# Patient Record
Sex: FEMALE | Race: WHITE | Employment: OTHER | URBAN - NONMETROPOLITAN AREA
[De-identification: names, ages, dates, MRNs, and addresses within clinical notes are randomized per-mention and may not be internally consistent; named-entity substitution may affect disease eponyms.]

---

## 2024-05-08 VITALS — BODY MASS INDEX: 27.44 KG/M2 | WEIGHT: 160.72 LBS | HEIGHT: 64 IN

## 2024-05-08 PROBLEM — J18.9 PNEUMONIA OF RIGHT LOWER LOBE DUE TO INFECTIOUS ORGANISM: Status: ACTIVE | Noted: 2023-08-26

## 2024-05-08 PROBLEM — I25.10 CORONARY ATHEROSCLEROSIS: Status: ACTIVE | Noted: 2023-04-03

## 2024-05-08 PROBLEM — R55 SYNCOPE AND COLLAPSE: Status: ACTIVE | Noted: 2021-06-08

## 2024-05-08 PROBLEM — E83.42 HYPOMAGNESEMIA: Status: ACTIVE | Noted: 2020-03-16

## 2024-05-08 PROBLEM — I48.91 ATRIAL FIBRILLATION WITH RVR (HCC): Status: ACTIVE | Noted: 2018-11-05

## 2024-05-08 PROBLEM — R53.81 PHYSICAL DECONDITIONING: Status: ACTIVE | Noted: 2023-03-29

## 2024-05-08 PROBLEM — R06.02 SHORTNESS OF BREATH: Status: ACTIVE | Noted: 2023-03-13

## 2024-05-08 PROBLEM — J44.1 COPD EXACERBATION (HCC): Status: ACTIVE | Noted: 2022-11-16

## 2024-05-08 PROBLEM — R29.6 FREQUENT FALLS: Status: ACTIVE | Noted: 2023-12-08

## 2024-05-08 PROBLEM — R41.82 ALTERED MENTAL STATUS: Status: ACTIVE | Noted: 2023-03-22

## 2024-05-08 PROBLEM — F33.9 DEPRESSION, RECURRENT (HCC): Status: ACTIVE | Noted: 2021-12-08

## 2024-05-08 PROBLEM — R53.1 WEAKNESS: Status: ACTIVE | Noted: 2023-03-27

## 2024-05-08 RX ORDER — FUROSEMIDE 40 MG/1
40 TABLET ORAL DAILY
COMMUNITY
Start: 2023-12-08

## 2024-05-08 RX ORDER — FERROUS SULFATE 325(65) MG
325 TABLET ORAL
COMMUNITY
Start: 2023-12-08 | End: 2024-06-05

## 2024-05-08 RX ORDER — ALBUTEROL SULFATE 90 UG/1
2 AEROSOL, METERED RESPIRATORY (INHALATION) EVERY 4 HOURS PRN
COMMUNITY
Start: 2023-05-30

## 2024-05-08 RX ORDER — POTASSIUM CHLORIDE 20 MEQ/1
20 TABLET, EXTENDED RELEASE ORAL 2 TIMES DAILY
COMMUNITY
Start: 2023-12-08

## 2024-05-08 RX ORDER — PANTOPRAZOLE SODIUM 40 MG/1
40 TABLET, DELAYED RELEASE ORAL DAILY
COMMUNITY
Start: 2023-04-03

## 2024-05-08 RX ORDER — SUCRALFATE 1 G/1
1 TABLET ORAL
COMMUNITY
Start: 2024-02-09

## 2024-05-08 RX ORDER — ACETAMINOPHEN 500 MG
1000 TABLET ORAL EVERY 6 HOURS PRN
COMMUNITY
Start: 2023-12-08 | End: 2024-12-07

## 2024-05-08 RX ORDER — ALBUTEROL SULFATE 2.5 MG/3ML
2.5 SOLUTION RESPIRATORY (INHALATION) EVERY 4 HOURS PRN
COMMUNITY
Start: 2023-03-16

## 2024-05-08 RX ORDER — AMIODARONE HYDROCHLORIDE 200 MG/1
200 TABLET ORAL DAILY
COMMUNITY
Start: 2022-11-23

## 2024-05-08 RX ORDER — PSEUDOEPHEDRINE HCL 30 MG
100 TABLET ORAL DAILY
COMMUNITY

## 2024-05-15 ENCOUNTER — INITIAL CONSULT (OUTPATIENT)
Dept: SURGERY | Age: 89
End: 2024-05-15
Payer: MEDICARE

## 2024-05-15 VITALS
TEMPERATURE: 97.5 F | DIASTOLIC BLOOD PRESSURE: 82 MMHG | BODY MASS INDEX: 27.59 KG/M2 | HEIGHT: 64 IN | SYSTOLIC BLOOD PRESSURE: 122 MMHG | HEART RATE: 93 BPM | RESPIRATION RATE: 16 BRPM | OXYGEN SATURATION: 96 %

## 2024-05-15 DIAGNOSIS — K62.5 BRBPR (BRIGHT RED BLOOD PER RECTUM): Primary | ICD-10-CM

## 2024-05-15 PROCEDURE — 1123F ACP DISCUSS/DSCN MKR DOCD: CPT | Performed by: PHYSICIAN ASSISTANT

## 2024-05-15 PROCEDURE — 99204 OFFICE O/P NEW MOD 45 MIN: CPT | Performed by: PHYSICIAN ASSISTANT

## 2024-05-15 RX ORDER — LIDOCAINE HCL 4 G/100G
CREAM TOPICAL
COMMUNITY
Start: 2024-05-08

## 2024-05-15 RX ORDER — LIDOCAINE PAIN RELIEF 40 MG/1000MG
PATCH TOPICAL
COMMUNITY
Start: 2024-04-15

## 2024-05-15 RX ORDER — LANOLIN ALCOHOL/MO/W.PET/CERES
CREAM (GRAM) TOPICAL
COMMUNITY
Start: 2024-05-01

## 2024-05-15 NOTE — PROGRESS NOTES
Subjective   Soumya Smith is a 89 y.o. female who presents today for anemia, patient states a month ago on April 12 her labs showed her hemoglobin was low, 7.7, she is always been a little bit low in the tens and 11s, they did a stool occult and was positive for blood, she states she is also noticed some bright red blood in her stools, has never had a colonoscopy or EGD, does have a history of acid reflux, has had some nausea, off-and-on constipation over the years, no fevers or chills, has been tired, some episodes of dizziness and lightheadedness.  Is concerned about doing further evaluation because of risk in her age, has been told that she had a valve needing replaced but cardiology said they would not do it at her age, also has COPD and is on a daily inhaler, gets bronchitis easily, does not need oxygen.  Notes no severe abdominal pain no significant changes in diet or weight.    Past Medical History:   Diagnosis Date    Atrial fibrillation (HCC) 10/2018    Cancer of ear     Depression     Diabetes (HCC)     GERD (gastroesophageal reflux disease)     High cholesterol     Hyperlipemia     Hypertension     Osteoarthritis     Skin cancer of eyelid     Skin cancer of nose        Past Surgical History:   Procedure Laterality Date    APPENDECTOMY N/A     CHOLECYSTECTOMY N/A     HYSTERECTOMY (CERVIX STATUS UNKNOWN) N/A     TONSILLECTOMY AND ADENOIDECTOMY N/A     TRANSESOPHAGEAL ECHOCARDIOGRAM N/A 10/2018    during cardioversion due to A Fib (NOT during a heart cath) @ [facility] in [facility]       Current Outpatient Medications   Medication Sig Dispense Refill    magnesium oxide (MAG-OX) 400 (240 Mg) MG tablet       ASPERCREME LIDOCAINE 4 % CREA       LIDOCAINE PAIN RELIEF 4 % external patch       acetaminophen (TYLENOL) 500 MG tablet Take 2 tablets by mouth every 6 hours as needed for Fever or Pain      albuterol sulfate HFA (PROVENTIL;VENTOLIN;PROAIR) 108 (90 Base) MCG/ACT inhaler Inhale 2 puffs into the

## 2024-05-15 NOTE — ASSESSMENT & PLAN NOTE
89-year-old female with history of GERD, COPD, A-fib and cardiac valve issue is here with concerns of anemia, we discussed options including watchful waiting, iron infusions, upper and lower scopes and patient and family decided they would like to do the upper and lower scopes for further evaluation.  There is concern of risk with the procedure and anesthesia given her history and we will reach out to cardiology for any preoperative testing or clearance recommendations as well as managing her Eliquis around the time of the procedure.  Will schedule her upper and lower scopes for evaluation of anemia, she has never had a colonoscopy or EGD done in the past.    Risk factors:  AGE 89  Bright red blood in stool  Anemia 7.5 on 5/72024   No prior CS or EGD    Plan:  EGD and CS  Cardiac clearance

## 2024-05-21 ENCOUNTER — TELEPHONE (OUTPATIENT)
Dept: SURGERY | Age: 89
End: 2024-05-21

## 2024-05-21 NOTE — TELEPHONE ENCOUNTER
[facility] cardiac risk form filled out and faxed to Dr. Edwards's office. Received completed form via fax, patient is cleared to proceed with EGD & Colonoscopy on 5/29/24 with Dr. Miranda @ [facility] and is to hold Eliquis x 2 days prior.     Contacted phone number listed in chart and spoke with patients daughter in law Maria Capps. Informed Maria patient is cleared to proceed with endoscopy and needs to hold blood thinner medication (Eliquis) x 2 days prior. Maria voiced understanding and asked writer to contact [facility] to inform the nurses there. Called and spoke with nurse Laquita and informed her of medication hold and copy to be faxed to [phone] at this time.     Form also faxed to [facility] surgery and noted on fax cover sheet to contact Maria with arrival time for procedure and that patient resides at [facility].

## 2024-05-23 ENCOUNTER — TELEPHONE (OUTPATIENT)
Dept: SURGERY | Age: 89
End: 2024-05-23

## 2024-05-23 NOTE — TELEPHONE ENCOUNTER
Dr Miranda has emergency surgery in [place] on 5-29-24. Rescheduled EGD and Colonoscopy from 5-29-24 to 6-14-24. [facility] made aware.

## 2024-06-12 ENCOUNTER — TELEPHONE (OUTPATIENT)
Dept: SURGERY | Age: 89
End: 2024-06-12

## 2024-06-12 NOTE — TELEPHONE ENCOUNTER
Called and talked with Oj CYR and Jayashree regarding patient's colonoscopy results of colon cancer. Verbalized understanding.  Patient does have appointment with Dr Miranda on 6-21-24.

## 2024-06-17 ENCOUNTER — TELEPHONE (OUTPATIENT)
Dept: SURGERY | Age: 89
End: 2024-06-17

## 2024-06-17 NOTE — TELEPHONE ENCOUNTER
Jeimy from [facility] called. She states that patient had started loose stools with dark red blood.  She had one episode yesterday and today. Patient is did vomit yesterday in the morning.  She is c/o ABD pain in the epigastric area and more Rt side. Decrease appetite noted.  Vital signs or good and no fever.    Patient had CS done on 6-7-24 with findings of sigmoid diverticulosis, tubular adenoma, and ulcerate mass that is invasive adenocarcinoma with moderately differentiate.    Please advise

## 2024-06-18 NOTE — TELEPHONE ENCOUNTER
She is in the [facility] ,  I will talk to her and her faimly when we are over there on this Friday.

## 2024-06-23 ENCOUNTER — APPOINTMENT (OUTPATIENT)
Dept: GENERAL RADIOLOGY | Age: 89
End: 2024-06-23
Attending: FAMILY MEDICINE
Payer: MEDICARE

## 2024-06-23 ENCOUNTER — HOSPITAL ENCOUNTER (INPATIENT)
Age: 89
LOS: 11 days | End: 2024-07-04
Attending: FAMILY MEDICINE | Admitting: INTERNAL MEDICINE
Payer: MEDICARE

## 2024-06-23 DIAGNOSIS — R06.02 SHORTNESS OF BREATH: Primary | ICD-10-CM

## 2024-06-23 DIAGNOSIS — C18.3 MALIGNANT NEOPLASM OF HEPATIC FLEXURE (HCC): ICD-10-CM

## 2024-06-23 PROBLEM — C18.9 COLON CANCER (HCC): Status: ACTIVE | Noted: 2024-06-23

## 2024-06-23 LAB
ABO + RH BLD: NORMAL
ANION GAP SERPL CALCULATED.3IONS-SCNC: 9 MMOL/L (ref 9–17)
ARM BAND NUMBER: NORMAL
BASOPHILS # BLD: 0.04 K/UL (ref 0–0.2)
BASOPHILS NFR BLD: 0 % (ref 0–2)
BLOOD BANK SAMPLE EXPIRATION: NORMAL
BLOOD GROUP ANTIBODIES SERPL: NEGATIVE
BUN SERPL-MCNC: 12 MG/DL (ref 8–23)
BUN/CREAT SERPL: 13 (ref 9–20)
CALCIUM SERPL-MCNC: 7.9 MG/DL (ref 8.6–10.4)
CHLORIDE SERPL-SCNC: 107 MMOL/L (ref 98–107)
CO2 SERPL-SCNC: 20 MMOL/L (ref 20–31)
CREAT SERPL-MCNC: 0.9 MG/DL (ref 0.5–0.9)
EOSINOPHIL # BLD: 0.28 K/UL (ref 0–0.44)
EOSINOPHILS RELATIVE PERCENT: 2 % (ref 1–4)
ERYTHROCYTE [DISTWIDTH] IN BLOOD BY AUTOMATED COUNT: 15.1 % (ref 11.8–14.4)
GFR, ESTIMATED: 60 ML/MIN/1.73M2
GLUCOSE SERPL-MCNC: 101 MG/DL (ref 70–99)
HCT VFR BLD AUTO: 33.8 % (ref 36.3–47.1)
HGB BLD-MCNC: 10.9 G/DL (ref 11.9–15.1)
IMM GRANULOCYTES # BLD AUTO: 0.07 K/UL (ref 0–0.3)
IMM GRANULOCYTES NFR BLD: 1 %
LYMPHOCYTES NFR BLD: 1.31 K/UL (ref 1.1–3.7)
LYMPHOCYTES RELATIVE PERCENT: 11 % (ref 24–43)
MAGNESIUM SERPL-MCNC: 1.5 MG/DL (ref 1.6–2.6)
MCH RBC QN AUTO: 28.2 PG (ref 25.2–33.5)
MCHC RBC AUTO-ENTMCNC: 32.2 G/DL (ref 25.2–33.5)
MCV RBC AUTO: 87.3 FL (ref 82.6–102.9)
MONOCYTES NFR BLD: 0.61 K/UL (ref 0.1–1.2)
MONOCYTES NFR BLD: 5 % (ref 3–12)
NEUTROPHILS NFR BLD: 81 % (ref 36–65)
NEUTS SEG NFR BLD: 9.25 K/UL (ref 1.5–8.1)
NRBC BLD-RTO: 0 PER 100 WBC
PLATELET # BLD AUTO: 335 K/UL (ref 138–453)
PMV BLD AUTO: 9 FL (ref 8.1–13.5)
POTASSIUM SERPL-SCNC: 3.9 MMOL/L (ref 3.7–5.3)
RBC # BLD AUTO: 3.87 M/UL (ref 3.95–5.11)
RBC # BLD: ABNORMAL 10*6/UL
SODIUM SERPL-SCNC: 136 MMOL/L (ref 135–144)
WBC OTHER # BLD: 11.6 K/UL (ref 3.5–11.3)

## 2024-06-23 PROCEDURE — 6370000000 HC RX 637 (ALT 250 FOR IP): Performed by: FAMILY MEDICINE

## 2024-06-23 PROCEDURE — 80048 BASIC METABOLIC PNL TOTAL CA: CPT

## 2024-06-23 PROCEDURE — 93005 ELECTROCARDIOGRAM TRACING: CPT | Performed by: FAMILY MEDICINE

## 2024-06-23 PROCEDURE — 86900 BLOOD TYPING SEROLOGIC ABO: CPT

## 2024-06-23 PROCEDURE — 6360000002 HC RX W HCPCS: Performed by: FAMILY MEDICINE

## 2024-06-23 PROCEDURE — 86901 BLOOD TYPING SEROLOGIC RH(D): CPT

## 2024-06-23 PROCEDURE — 6370000000 HC RX 637 (ALT 250 FOR IP): Performed by: SURGERY

## 2024-06-23 PROCEDURE — 83735 ASSAY OF MAGNESIUM: CPT

## 2024-06-23 PROCEDURE — 36415 COLL VENOUS BLD VENIPUNCTURE: CPT

## 2024-06-23 PROCEDURE — 86850 RBC ANTIBODY SCREEN: CPT

## 2024-06-23 PROCEDURE — 6360000002 HC RX W HCPCS: Performed by: SURGERY

## 2024-06-23 PROCEDURE — 85025 COMPLETE CBC W/AUTO DIFF WBC: CPT

## 2024-06-23 PROCEDURE — 71045 X-RAY EXAM CHEST 1 VIEW: CPT

## 2024-06-23 PROCEDURE — 2060000000 HC ICU INTERMEDIATE R&B

## 2024-06-23 PROCEDURE — 2580000003 HC RX 258: Performed by: FAMILY MEDICINE

## 2024-06-23 RX ORDER — ACETAMINOPHEN 500 MG
1000 TABLET ORAL EVERY 6 HOURS PRN
Status: DISCONTINUED | OUTPATIENT
Start: 2024-06-23 | End: 2024-07-04

## 2024-06-23 RX ORDER — DOCUSATE SODIUM 100 MG/1
100 CAPSULE, LIQUID FILLED ORAL DAILY
Status: DISCONTINUED | OUTPATIENT
Start: 2024-06-23 | End: 2024-07-04 | Stop reason: HOSPADM

## 2024-06-23 RX ORDER — DEXTROSE MONOHYDRATE AND SODIUM CHLORIDE 5; .45 G/100ML; G/100ML
INJECTION, SOLUTION INTRAVENOUS CONTINUOUS
Status: DISCONTINUED | OUTPATIENT
Start: 2024-06-23 | End: 2024-06-24

## 2024-06-23 RX ORDER — DEXTROSE MONOHYDRATE, SODIUM CHLORIDE, AND POTASSIUM CHLORIDE 50; 1.49; 4.5 G/1000ML; G/1000ML; G/1000ML
INJECTION, SOLUTION INTRAVENOUS CONTINUOUS
Status: DISCONTINUED | OUTPATIENT
Start: 2024-06-23 | End: 2024-06-23

## 2024-06-23 RX ORDER — SUCRALFATE 1 G/1
1 TABLET ORAL
Status: DISCONTINUED | OUTPATIENT
Start: 2024-06-23 | End: 2024-07-04 | Stop reason: HOSPADM

## 2024-06-23 RX ORDER — AMIODARONE HYDROCHLORIDE 200 MG/1
200 TABLET ORAL DAILY
Status: DISCONTINUED | OUTPATIENT
Start: 2024-06-23 | End: 2024-07-04 | Stop reason: HOSPADM

## 2024-06-23 RX ORDER — ATORVASTATIN CALCIUM 40 MG/1
40 TABLET, FILM COATED ORAL DAILY
Status: DISCONTINUED | OUTPATIENT
Start: 2024-06-23 | End: 2024-07-04 | Stop reason: HOSPADM

## 2024-06-23 RX ORDER — MAGNESIUM SULFATE 1 G/100ML
1000 INJECTION INTRAVENOUS ONCE
Status: COMPLETED | OUTPATIENT
Start: 2024-06-23 | End: 2024-06-23

## 2024-06-23 RX ORDER — PANTOPRAZOLE SODIUM 40 MG/1
40 TABLET, DELAYED RELEASE ORAL DAILY
Status: DISCONTINUED | OUTPATIENT
Start: 2024-06-23 | End: 2024-06-28

## 2024-06-23 RX ORDER — POLYETHYLENE GLYCOL 3350 17 G/17G
238 POWDER, FOR SOLUTION ORAL ONCE
Status: COMPLETED | OUTPATIENT
Start: 2024-06-23 | End: 2024-06-23

## 2024-06-23 RX ORDER — ENOXAPARIN SODIUM 100 MG/ML
1 INJECTION SUBCUTANEOUS ONCE
Status: COMPLETED | OUTPATIENT
Start: 2024-06-23 | End: 2024-06-23

## 2024-06-23 RX ADMIN — DOCUSATE SODIUM 100 MG: 100 CAPSULE, LIQUID FILLED ORAL at 15:37

## 2024-06-23 RX ADMIN — ENOXAPARIN SODIUM 90 MG: 100 INJECTION SUBCUTANEOUS at 15:36

## 2024-06-23 RX ADMIN — DEXTROSE AND SODIUM CHLORIDE: 5; 450 INJECTION, SOLUTION INTRAVENOUS at 13:11

## 2024-06-23 RX ADMIN — SUCRALFATE 1 G: 1 TABLET ORAL at 15:37

## 2024-06-23 RX ADMIN — MAGNESIUM SULFATE HEPTAHYDRATE 1000 MG: 1 INJECTION, SOLUTION INTRAVENOUS at 14:03

## 2024-06-23 RX ADMIN — POLYETHYLENE GLYCOL 3350 238 G: 17 POWDER, FOR SOLUTION ORAL at 17:09

## 2024-06-23 ASSESSMENT — PAIN SCALES - GENERAL
PAINLEVEL_OUTOF10: 0
PAINLEVEL_OUTOF10: 0

## 2024-06-24 ENCOUNTER — ANESTHESIA EVENT (OUTPATIENT)
Dept: OPERATING ROOM | Age: 89
End: 2024-06-24
Payer: MEDICARE

## 2024-06-24 ENCOUNTER — ANESTHESIA (OUTPATIENT)
Dept: OPERATING ROOM | Age: 89
End: 2024-06-24
Payer: MEDICARE

## 2024-06-24 ENCOUNTER — APPOINTMENT (OUTPATIENT)
Dept: GENERAL RADIOLOGY | Age: 89
End: 2024-06-24
Attending: FAMILY MEDICINE
Payer: MEDICARE

## 2024-06-24 PROBLEM — R19.00 ABDOMINAL MASS, UNSPECIFIED ABDOMINAL LOCATION: Status: ACTIVE | Noted: 2024-06-24

## 2024-06-24 PROBLEM — Z90.49 S/P RIGHT HEMICOLECTOMY: Status: ACTIVE | Noted: 2024-06-24

## 2024-06-24 LAB
ANION GAP SERPL CALCULATED.3IONS-SCNC: 10 MMOL/L (ref 9–17)
ANION GAP SERPL CALCULATED.3IONS-SCNC: 11 MMOL/L (ref 9–17)
BASOPHILS # BLD: 0 K/UL (ref 0–0.2)
BASOPHILS # BLD: 0.05 K/UL (ref 0–0.2)
BASOPHILS NFR BLD: 0 % (ref 0–1)
BASOPHILS NFR BLD: 1 % (ref 0–2)
BUN SERPL-MCNC: 8 MG/DL (ref 8–23)
BUN SERPL-MCNC: 9 MG/DL (ref 8–23)
BUN/CREAT SERPL: 11 (ref 9–20)
BUN/CREAT SERPL: 9 (ref 9–20)
CALCIUM SERPL-MCNC: 7.7 MG/DL (ref 8.6–10.4)
CALCIUM SERPL-MCNC: 8.7 MG/DL (ref 8.6–10.4)
CHLORIDE SERPL-SCNC: 104 MMOL/L (ref 98–107)
CHLORIDE SERPL-SCNC: 106 MMOL/L (ref 98–107)
CO2 SERPL-SCNC: 16 MMOL/L (ref 20–31)
CO2 SERPL-SCNC: 18 MMOL/L (ref 20–31)
CREAT SERPL-MCNC: 0.8 MG/DL (ref 0.5–0.9)
CREAT SERPL-MCNC: 0.9 MG/DL (ref 0.5–0.9)
EKG ATRIAL RATE: 86 BPM
EKG P-R INTERVAL: 184 MS
EKG Q-T INTERVAL: 410 MS
EKG QRS DURATION: 134 MS
EKG QTC CALCULATION (BAZETT): 490 MS
EKG R AXIS: -63 DEGREES
EKG T AXIS: 7 DEGREES
EKG VENTRICULAR RATE: 86 BPM
EOSINOPHIL # BLD: 0 K/UL (ref 0–0.4)
EOSINOPHIL # BLD: 0.28 K/UL (ref 0–0.44)
EOSINOPHILS RELATIVE PERCENT: 0 % (ref 1–7)
EOSINOPHILS RELATIVE PERCENT: 3 % (ref 1–4)
ERYTHROCYTE [DISTWIDTH] IN BLOOD BY AUTOMATED COUNT: 14.9 % (ref 11.8–14.4)
ERYTHROCYTE [DISTWIDTH] IN BLOOD BY AUTOMATED COUNT: 15.1 % (ref 11.8–14.4)
FIO2: 2
GFR, ESTIMATED: 60 ML/MIN/1.73M2
GFR, ESTIMATED: 70 ML/MIN/1.73M2
GLUCOSE SERPL-MCNC: 114 MG/DL (ref 70–99)
GLUCOSE SERPL-MCNC: 194 MG/DL (ref 70–99)
HCO3 VENOUS: 16.7 MMOL/L (ref 22–29)
HCO3 VENOUS: 17.4 MMOL/L (ref 22–29)
HCT VFR BLD AUTO: 32.6 % (ref 36.3–47.1)
HCT VFR BLD AUTO: 32.9 % (ref 36.3–47.1)
HGB BLD-MCNC: 10.2 G/DL (ref 11.9–15.1)
HGB BLD-MCNC: 10.6 G/DL (ref 11.9–15.1)
IMM GRANULOCYTES # BLD AUTO: 0 K/UL (ref 0–0.3)
IMM GRANULOCYTES # BLD AUTO: 0.08 K/UL (ref 0–0.3)
IMM GRANULOCYTES NFR BLD: 0 %
IMM GRANULOCYTES NFR BLD: 1 %
INR PPP: 1.6
LYMPHOCYTES NFR BLD: 1.7 K/UL (ref 1.1–3.7)
LYMPHOCYTES NFR BLD: 2.07 K/UL (ref 1–4.8)
LYMPHOCYTES RELATIVE PERCENT: 16 % (ref 24–43)
LYMPHOCYTES RELATIVE PERCENT: 9 % (ref 16–46)
MAGNESIUM SERPL-MCNC: 1.6 MG/DL (ref 1.6–2.6)
MCH RBC QN AUTO: 28 PG (ref 25.2–33.5)
MCH RBC QN AUTO: 28 PG (ref 25.2–33.5)
MCHC RBC AUTO-ENTMCNC: 31.3 G/DL (ref 25.2–33.5)
MCHC RBC AUTO-ENTMCNC: 32.2 G/DL (ref 25.2–33.5)
MCV RBC AUTO: 86.8 FL (ref 82.6–102.9)
MCV RBC AUTO: 89.6 FL (ref 82.6–102.9)
MONOCYTES NFR BLD: 0.23 K/UL (ref 0.1–1.2)
MONOCYTES NFR BLD: 0.6 K/UL (ref 0.1–1.2)
MONOCYTES NFR BLD: 1 % (ref 4–11)
MONOCYTES NFR BLD: 6 % (ref 3–12)
MORPHOLOGY: ABNORMAL
MORPHOLOGY: ABNORMAL
NEGATIVE BASE EXCESS, VEN: 9.5 MMOL/L (ref 0–2)
NEGATIVE BASE EXCESS, VEN: 9.8 MMOL/L (ref 0–2)
NEUTROPHILS NFR BLD: 73 % (ref 36–65)
NEUTROPHILS NFR BLD: 90 % (ref 43–77)
NEUTS SEG NFR BLD: 20.7 K/UL (ref 1.5–8.1)
NEUTS SEG NFR BLD: 7.71 K/UL (ref 1.5–8.1)
NRBC BLD-RTO: 0 PER 100 WBC
NRBC BLD-RTO: 0 PER 100 WBC
O2 DELIVERY DEVICE: ABNORMAL
O2 SAT, VEN: 70.6 % (ref 60–85)
O2 SAT, VEN: 98.1 % (ref 60–85)
PARTIAL THROMBOPLASTIN TIME: 39 SEC (ref 23.9–33.8)
PCO2 VENOUS: 36.6 MM HG (ref 41–51)
PCO2 VENOUS: 42.1 MM HG (ref 41–51)
PH VENOUS: 7.22 (ref 7.32–7.43)
PH VENOUS: 7.27 (ref 7.32–7.43)
PLATELET # BLD AUTO: 305 K/UL (ref 138–453)
PLATELET # BLD AUTO: 346 K/UL (ref 138–453)
PMV BLD AUTO: 9.3 FL (ref 8.1–13.5)
PMV BLD AUTO: 9.3 FL (ref 8.1–13.5)
PO2 VENOUS: 120.6 MM HG (ref 30–50)
PO2 VENOUS: 44 MM HG (ref 30–50)
POTASSIUM SERPL-SCNC: 3.5 MMOL/L (ref 3.7–5.3)
POTASSIUM SERPL-SCNC: 4.3 MMOL/L (ref 3.7–5.3)
PROTHROMBIN TIME: 18.2 SEC (ref 11.5–14.2)
RBC # BLD AUTO: 3.64 M/UL (ref 3.95–5.11)
RBC # BLD AUTO: 3.79 M/UL (ref 3.95–5.11)
RBC # BLD: ABNORMAL 10*6/UL
SODIUM SERPL-SCNC: 132 MMOL/L (ref 135–144)
SODIUM SERPL-SCNC: 133 MMOL/L (ref 135–144)
WBC OTHER # BLD: 10.4 K/UL (ref 3.5–11.3)
WBC OTHER # BLD: 23 K/UL (ref 3.5–11.3)

## 2024-06-24 PROCEDURE — 3E033XZ INTRODUCTION OF VASOPRESSOR INTO PERIPHERAL VEIN, PERCUTANEOUS APPROACH: ICD-10-PCS | Performed by: FAMILY MEDICINE

## 2024-06-24 PROCEDURE — 2700000000 HC OXYGEN THERAPY PER DAY

## 2024-06-24 PROCEDURE — 6370000000 HC RX 637 (ALT 250 FOR IP): Performed by: FAMILY MEDICINE

## 2024-06-24 PROCEDURE — 80048 BASIC METABOLIC PNL TOTAL CA: CPT

## 2024-06-24 PROCEDURE — 2580000003 HC RX 258: Performed by: SURGERY

## 2024-06-24 PROCEDURE — 2720000010 HC SURG SUPPLY STERILE: Performed by: SURGERY

## 2024-06-24 PROCEDURE — 02HV33Z INSERTION OF INFUSION DEVICE INTO SUPERIOR VENA CAVA, PERCUTANEOUS APPROACH: ICD-10-PCS | Performed by: FAMILY MEDICINE

## 2024-06-24 PROCEDURE — 2580000003 HC RX 258: Performed by: NURSE ANESTHETIST, CERTIFIED REGISTERED

## 2024-06-24 PROCEDURE — 88309 TISSUE EXAM BY PATHOLOGIST: CPT

## 2024-06-24 PROCEDURE — 7100000001 HC PACU RECOVERY - ADDTL 15 MIN: Performed by: SURGERY

## 2024-06-24 PROCEDURE — P9047 ALBUMIN (HUMAN), 25%, 50ML: HCPCS | Performed by: NURSE ANESTHETIST, CERTIFIED REGISTERED

## 2024-06-24 PROCEDURE — 2500000003 HC RX 250 WO HCPCS: Performed by: NURSE ANESTHETIST, CERTIFIED REGISTERED

## 2024-06-24 PROCEDURE — 44160 REMOVAL OF COLON: CPT | Performed by: SURGERY

## 2024-06-24 PROCEDURE — 3E0T3BZ INTRODUCTION OF ANESTHETIC AGENT INTO PERIPHERAL NERVES AND PLEXI, PERCUTANEOUS APPROACH: ICD-10-PCS | Performed by: FAMILY MEDICINE

## 2024-06-24 PROCEDURE — 6360000002 HC RX W HCPCS: Performed by: PHYSICIAN ASSISTANT

## 2024-06-24 PROCEDURE — 82805 BLOOD GASES W/O2 SATURATION: CPT

## 2024-06-24 PROCEDURE — 2580000003 HC RX 258: Performed by: INTERNAL MEDICINE

## 2024-06-24 PROCEDURE — 6360000002 HC RX W HCPCS: Performed by: NURSE ANESTHETIST, CERTIFIED REGISTERED

## 2024-06-24 PROCEDURE — 6360000002 HC RX W HCPCS: Performed by: NURSE PRACTITIONER

## 2024-06-24 PROCEDURE — 36415 COLL VENOUS BLD VENIPUNCTURE: CPT

## 2024-06-24 PROCEDURE — 88341 IMHCHEM/IMCYTCHM EA ADD ANTB: CPT

## 2024-06-24 PROCEDURE — 94761 N-INVAS EAR/PLS OXIMETRY MLT: CPT

## 2024-06-24 PROCEDURE — 3700000001 HC ADD 15 MINUTES (ANESTHESIA): Performed by: SURGERY

## 2024-06-24 PROCEDURE — 82803 BLOOD GASES ANY COMBINATION: CPT

## 2024-06-24 PROCEDURE — 6370000000 HC RX 637 (ALT 250 FOR IP): Performed by: INTERNAL MEDICINE

## 2024-06-24 PROCEDURE — 71045 X-RAY EXAM CHEST 1 VIEW: CPT

## 2024-06-24 PROCEDURE — 2000000000 HC ICU R&B

## 2024-06-24 PROCEDURE — 3600000014 HC SURGERY LEVEL 4 ADDTL 15MIN: Performed by: SURGERY

## 2024-06-24 PROCEDURE — 64488 TAP BLOCK BI INJECTION: CPT | Performed by: NURSE ANESTHETIST, CERTIFIED REGISTERED

## 2024-06-24 PROCEDURE — 2580000003 HC RX 258: Performed by: PHYSICIAN ASSISTANT

## 2024-06-24 PROCEDURE — 93005 ELECTROCARDIOGRAM TRACING: CPT | Performed by: INTERNAL MEDICINE

## 2024-06-24 PROCEDURE — 6360000002 HC RX W HCPCS: Performed by: SURGERY

## 2024-06-24 PROCEDURE — 7100000000 HC PACU RECOVERY - FIRST 15 MIN: Performed by: SURGERY

## 2024-06-24 PROCEDURE — 2709999900 HC NON-CHARGEABLE SUPPLY: Performed by: SURGERY

## 2024-06-24 PROCEDURE — 2500000003 HC RX 250 WO HCPCS: Performed by: INTERNAL MEDICINE

## 2024-06-24 PROCEDURE — 85730 THROMBOPLASTIN TIME PARTIAL: CPT

## 2024-06-24 PROCEDURE — 2500000003 HC RX 250 WO HCPCS: Performed by: NURSE PRACTITIONER

## 2024-06-24 PROCEDURE — 85025 COMPLETE CBC W/AUTO DIFF WBC: CPT

## 2024-06-24 PROCEDURE — 99231 SBSQ HOSP IP/OBS SF/LOW 25: CPT | Performed by: INTERNAL MEDICINE

## 2024-06-24 PROCEDURE — 6370000000 HC RX 637 (ALT 250 FOR IP): Performed by: PHYSICIAN ASSISTANT

## 2024-06-24 PROCEDURE — 88342 IMHCHEM/IMCYTCHM 1ST ANTB: CPT

## 2024-06-24 PROCEDURE — 3700000000 HC ANESTHESIA ATTENDED CARE: Performed by: SURGERY

## 2024-06-24 PROCEDURE — 3600000004 HC SURGERY LEVEL 4 BASE: Performed by: SURGERY

## 2024-06-24 PROCEDURE — 85610 PROTHROMBIN TIME: CPT

## 2024-06-24 PROCEDURE — 83735 ASSAY OF MAGNESIUM: CPT

## 2024-06-24 PROCEDURE — 94640 AIRWAY INHALATION TREATMENT: CPT

## 2024-06-24 PROCEDURE — 0DTF0ZZ RESECTION OF RIGHT LARGE INTESTINE, OPEN APPROACH: ICD-10-PCS | Performed by: SURGERY

## 2024-06-24 RX ORDER — SODIUM CHLORIDE 0.9 % (FLUSH) 0.9 %
5-40 SYRINGE (ML) INJECTION PRN
Status: DISCONTINUED | OUTPATIENT
Start: 2024-06-24 | End: 2024-06-24 | Stop reason: HOSPADM

## 2024-06-24 RX ORDER — POTASSIUM CHLORIDE 7.45 MG/ML
INJECTION INTRAVENOUS PRN
Status: DISCONTINUED | OUTPATIENT
Start: 2024-06-24 | End: 2024-06-24 | Stop reason: SDUPTHER

## 2024-06-24 RX ORDER — POTASSIUM CHLORIDE 20 MEQ/1
40 TABLET, EXTENDED RELEASE ORAL PRN
Status: DISCONTINUED | OUTPATIENT
Start: 2024-06-24 | End: 2024-06-29

## 2024-06-24 RX ORDER — ALBUTEROL SULFATE 2.5 MG/3ML
2.5 SOLUTION RESPIRATORY (INHALATION)
Status: DISCONTINUED | OUTPATIENT
Start: 2024-06-24 | End: 2024-07-04 | Stop reason: HOSPADM

## 2024-06-24 RX ORDER — DEXAMETHASONE SODIUM PHOSPHATE 10 MG/ML
INJECTION INTRAMUSCULAR; INTRAVENOUS PRN
Status: DISCONTINUED | OUTPATIENT
Start: 2024-06-24 | End: 2024-06-24

## 2024-06-24 RX ORDER — FENTANYL CITRATE 50 UG/ML
25 INJECTION, SOLUTION INTRAMUSCULAR; INTRAVENOUS EVERY 5 MIN PRN
Status: DISCONTINUED | OUTPATIENT
Start: 2024-06-24 | End: 2024-06-24 | Stop reason: HOSPADM

## 2024-06-24 RX ORDER — ATROPINE SULFATE 1 MG/ML
INJECTION, SOLUTION INTRAVENOUS PRN
Status: DISCONTINUED | OUTPATIENT
Start: 2024-06-24 | End: 2024-06-24 | Stop reason: SDUPTHER

## 2024-06-24 RX ORDER — SODIUM CHLORIDE FOR INHALATION 0.9 %
3 VIAL, NEBULIZER (ML) INHALATION
Status: DISCONTINUED | OUTPATIENT
Start: 2024-06-24 | End: 2024-06-28

## 2024-06-24 RX ORDER — METRONIDAZOLE 500 MG/100ML
500 INJECTION, SOLUTION INTRAVENOUS
Status: COMPLETED | OUTPATIENT
Start: 2024-06-24 | End: 2024-06-24

## 2024-06-24 RX ORDER — SODIUM CHLORIDE 0.9 % (FLUSH) 0.9 %
10 SYRINGE (ML) INJECTION EVERY 12 HOURS SCHEDULED
Status: DISCONTINUED | OUTPATIENT
Start: 2024-06-24 | End: 2024-07-04 | Stop reason: HOSPADM

## 2024-06-24 RX ORDER — SODIUM CHLORIDE 0.9 % (FLUSH) 0.9 %
5-40 SYRINGE (ML) INJECTION EVERY 12 HOURS SCHEDULED
Status: DISCONTINUED | OUTPATIENT
Start: 2024-06-24 | End: 2024-06-24 | Stop reason: HOSPADM

## 2024-06-24 RX ORDER — CALCIUM CHLORIDE 100 MG/ML
INJECTION INTRAVENOUS; INTRAVENTRICULAR PRN
Status: DISCONTINUED | OUTPATIENT
Start: 2024-06-24 | End: 2024-06-24 | Stop reason: SDUPTHER

## 2024-06-24 RX ORDER — IPRATROPIUM BROMIDE AND ALBUTEROL SULFATE 2.5; .5 MG/3ML; MG/3ML
1 SOLUTION RESPIRATORY (INHALATION)
Status: DISCONTINUED | OUTPATIENT
Start: 2024-06-24 | End: 2024-07-04 | Stop reason: HOSPADM

## 2024-06-24 RX ORDER — SODIUM CHLORIDE 9 MG/ML
INJECTION, SOLUTION INTRAVENOUS PRN
Status: DISCONTINUED | OUTPATIENT
Start: 2024-06-24 | End: 2024-07-04 | Stop reason: HOSPADM

## 2024-06-24 RX ORDER — BUPIVACAINE HYDROCHLORIDE AND EPINEPHRINE 2.5; 5 MG/ML; UG/ML
INJECTION, SOLUTION EPIDURAL; INFILTRATION; INTRACAUDAL; PERINEURAL PRN
Status: DISCONTINUED | OUTPATIENT
Start: 2024-06-24 | End: 2024-06-24 | Stop reason: SDUPTHER

## 2024-06-24 RX ORDER — METOPROLOL TARTRATE 1 MG/ML
2.5 INJECTION, SOLUTION INTRAVENOUS EVERY 6 HOURS PRN
Status: DISCONTINUED | OUTPATIENT
Start: 2024-06-24 | End: 2024-06-28

## 2024-06-24 RX ORDER — ONDANSETRON 4 MG/1
4 TABLET, ORALLY DISINTEGRATING ORAL EVERY 8 HOURS PRN
Status: DISCONTINUED | OUTPATIENT
Start: 2024-06-24 | End: 2024-07-04 | Stop reason: HOSPADM

## 2024-06-24 RX ORDER — FLUCONAZOLE 2 MG/ML
200 INJECTION, SOLUTION INTRAVENOUS EVERY 24 HOURS
Status: COMPLETED | OUTPATIENT
Start: 2024-06-24 | End: 2024-06-27

## 2024-06-24 RX ORDER — MIDODRINE HYDROCHLORIDE 5 MG/1
10 TABLET ORAL
Status: DISCONTINUED | OUTPATIENT
Start: 2024-06-24 | End: 2024-07-04 | Stop reason: HOSPADM

## 2024-06-24 RX ORDER — SODIUM CHLORIDE, SODIUM LACTATE, POTASSIUM CHLORIDE, CALCIUM CHLORIDE 600; 310; 30; 20 MG/100ML; MG/100ML; MG/100ML; MG/100ML
INJECTION, SOLUTION INTRAVENOUS CONTINUOUS
Status: DISCONTINUED | OUTPATIENT
Start: 2024-06-24 | End: 2024-06-24

## 2024-06-24 RX ORDER — MAGNESIUM SULFATE IN WATER 40 MG/ML
2000 INJECTION, SOLUTION INTRAVENOUS PRN
Status: DISCONTINUED | OUTPATIENT
Start: 2024-06-24 | End: 2024-06-29

## 2024-06-24 RX ORDER — ONDANSETRON 2 MG/ML
INJECTION INTRAMUSCULAR; INTRAVENOUS PRN
Status: DISCONTINUED | OUTPATIENT
Start: 2024-06-24 | End: 2024-06-24 | Stop reason: SDUPTHER

## 2024-06-24 RX ORDER — DEXAMETHASONE SODIUM PHOSPHATE 10 MG/ML
INJECTION INTRAMUSCULAR; INTRAVENOUS PRN
Status: DISCONTINUED | OUTPATIENT
Start: 2024-06-24 | End: 2024-06-24 | Stop reason: SDUPTHER

## 2024-06-24 RX ORDER — SODIUM CHLORIDE 9 MG/ML
INJECTION, SOLUTION INTRAVENOUS PRN
Status: DISCONTINUED | OUTPATIENT
Start: 2024-06-24 | End: 2024-06-24 | Stop reason: HOSPADM

## 2024-06-24 RX ORDER — SODIUM CHLORIDE, SODIUM LACTATE, POTASSIUM CHLORIDE, CALCIUM CHLORIDE 600; 310; 30; 20 MG/100ML; MG/100ML; MG/100ML; MG/100ML
INJECTION, SOLUTION INTRAVENOUS CONTINUOUS PRN
Status: DISCONTINUED | OUTPATIENT
Start: 2024-06-24 | End: 2024-06-24 | Stop reason: SDUPTHER

## 2024-06-24 RX ORDER — ALBUMIN (HUMAN) 12.5 G/50ML
SOLUTION INTRAVENOUS PRN
Status: DISCONTINUED | OUTPATIENT
Start: 2024-06-24 | End: 2024-06-24 | Stop reason: SDUPTHER

## 2024-06-24 RX ORDER — METRONIDAZOLE 500 MG/100ML
500 INJECTION, SOLUTION INTRAVENOUS EVERY 8 HOURS
Status: DISCONTINUED | OUTPATIENT
Start: 2024-06-24 | End: 2024-06-25 | Stop reason: ALTCHOICE

## 2024-06-24 RX ORDER — PROPOFOL 10 MG/ML
INJECTION, EMULSION INTRAVENOUS PRN
Status: DISCONTINUED | OUTPATIENT
Start: 2024-06-24 | End: 2024-06-24 | Stop reason: SDUPTHER

## 2024-06-24 RX ORDER — POTASSIUM CHLORIDE 7.45 MG/ML
10 INJECTION INTRAVENOUS
Status: DISPENSED | OUTPATIENT
Start: 2024-06-24 | End: 2024-06-24

## 2024-06-24 RX ORDER — ONDANSETRON 2 MG/ML
4 INJECTION INTRAMUSCULAR; INTRAVENOUS EVERY 6 HOURS PRN
Status: DISCONTINUED | OUTPATIENT
Start: 2024-06-24 | End: 2024-06-26 | Stop reason: SDUPTHER

## 2024-06-24 RX ORDER — MAGNESIUM SULFATE HEPTAHYDRATE 500 MG/ML
INJECTION, SOLUTION INTRAMUSCULAR; INTRAVENOUS PRN
Status: DISCONTINUED | OUTPATIENT
Start: 2024-06-24 | End: 2024-06-24 | Stop reason: SDUPTHER

## 2024-06-24 RX ORDER — MIDODRINE HYDROCHLORIDE 5 MG/1
5 TABLET ORAL
Status: DISCONTINUED | OUTPATIENT
Start: 2024-06-24 | End: 2024-06-24

## 2024-06-24 RX ORDER — OXYCODONE HYDROCHLORIDE 5 MG/1
10 TABLET ORAL EVERY 4 HOURS PRN
Status: DISCONTINUED | OUTPATIENT
Start: 2024-06-24 | End: 2024-07-01

## 2024-06-24 RX ORDER — IPRATROPIUM BROMIDE AND ALBUTEROL SULFATE 2.5; .5 MG/3ML; MG/3ML
1 SOLUTION RESPIRATORY (INHALATION)
Status: DISCONTINUED | OUTPATIENT
Start: 2024-06-24 | End: 2024-06-24 | Stop reason: HOSPADM

## 2024-06-24 RX ORDER — PHENYLEPHRINE HYDROCHLORIDE 10 MG/ML
INJECTION INTRAVENOUS PRN
Status: DISCONTINUED | OUTPATIENT
Start: 2024-06-24 | End: 2024-06-24 | Stop reason: SDUPTHER

## 2024-06-24 RX ORDER — ROCURONIUM BROMIDE 10 MG/ML
INJECTION, SOLUTION INTRAVENOUS PRN
Status: DISCONTINUED | OUTPATIENT
Start: 2024-06-24 | End: 2024-06-24 | Stop reason: SDUPTHER

## 2024-06-24 RX ORDER — DROPERIDOL 2.5 MG/ML
0.62 INJECTION, SOLUTION INTRAMUSCULAR; INTRAVENOUS
Status: DISCONTINUED | OUTPATIENT
Start: 2024-06-24 | End: 2024-06-24 | Stop reason: HOSPADM

## 2024-06-24 RX ORDER — KETAMINE HCL IN NACL, ISO-OSM 100MG/10ML
SYRINGE (ML) INJECTION PRN
Status: DISCONTINUED | OUTPATIENT
Start: 2024-06-24 | End: 2024-06-24 | Stop reason: SDUPTHER

## 2024-06-24 RX ORDER — POTASSIUM CHLORIDE 7.45 MG/ML
10 INJECTION INTRAVENOUS PRN
Status: DISCONTINUED | OUTPATIENT
Start: 2024-06-24 | End: 2024-06-29

## 2024-06-24 RX ORDER — DEXTROSE MONOHYDRATE, SODIUM CHLORIDE, AND POTASSIUM CHLORIDE 50; 1.49; 9 G/1000ML; G/1000ML; G/1000ML
INJECTION, SOLUTION INTRAVENOUS CONTINUOUS
Status: DISCONTINUED | OUTPATIENT
Start: 2024-06-24 | End: 2024-06-25

## 2024-06-24 RX ORDER — ONDANSETRON 2 MG/ML
4 INJECTION INTRAMUSCULAR; INTRAVENOUS EVERY 6 HOURS PRN
Status: DISCONTINUED | OUTPATIENT
Start: 2024-06-24 | End: 2024-07-04 | Stop reason: HOSPADM

## 2024-06-24 RX ORDER — DEXMEDETOMIDINE HYDROCHLORIDE 100 UG/ML
INJECTION, SOLUTION INTRAVENOUS PRN
Status: DISCONTINUED | OUTPATIENT
Start: 2024-06-24 | End: 2024-06-24 | Stop reason: SDUPTHER

## 2024-06-24 RX ORDER — 0.9 % SODIUM CHLORIDE 0.9 %
1000 INTRAVENOUS SOLUTION INTRAVENOUS ONCE
Status: COMPLETED | OUTPATIENT
Start: 2024-06-24 | End: 2024-06-24

## 2024-06-24 RX ORDER — OXYCODONE HYDROCHLORIDE 5 MG/1
5 TABLET ORAL EVERY 4 HOURS PRN
Status: DISCONTINUED | OUTPATIENT
Start: 2024-06-24 | End: 2024-07-01

## 2024-06-24 RX ORDER — SODIUM CHLORIDE 0.9 % (FLUSH) 0.9 %
10 SYRINGE (ML) INJECTION PRN
Status: DISCONTINUED | OUTPATIENT
Start: 2024-06-24 | End: 2024-07-04 | Stop reason: HOSPADM

## 2024-06-24 RX ORDER — DEXTROSE MONOHYDRATE AND SODIUM CHLORIDE 5; .45 G/100ML; G/100ML
INJECTION, SOLUTION INTRAVENOUS CONTINUOUS
Status: DISCONTINUED | OUTPATIENT
Start: 2024-06-24 | End: 2024-06-29

## 2024-06-24 RX ORDER — METOPROLOL TARTRATE 1 MG/ML
INJECTION, SOLUTION INTRAVENOUS PRN
Status: DISCONTINUED | OUTPATIENT
Start: 2024-06-24 | End: 2024-06-24 | Stop reason: SDUPTHER

## 2024-06-24 RX ORDER — ESMOLOL HYDROCHLORIDE 10 MG/ML
INJECTION INTRAVENOUS PRN
Status: DISCONTINUED | OUTPATIENT
Start: 2024-06-24 | End: 2024-06-24 | Stop reason: SDUPTHER

## 2024-06-24 RX ORDER — FLUCONAZOLE, SODIUM CHLORIDE 2 MG/ML
100 INJECTION INTRAVENOUS EVERY 24 HOURS
Status: DISCONTINUED | OUTPATIENT
Start: 2024-06-24 | End: 2024-06-24 | Stop reason: DRUGHIGH

## 2024-06-24 RX ORDER — NALOXONE HYDROCHLORIDE 0.4 MG/ML
INJECTION, SOLUTION INTRAMUSCULAR; INTRAVENOUS; SUBCUTANEOUS PRN
Status: DISCONTINUED | OUTPATIENT
Start: 2024-06-24 | End: 2024-06-24 | Stop reason: HOSPADM

## 2024-06-24 RX ADMIN — DEXMEDETOMIDINE 4 MCG: 100 INJECTION, SOLUTION INTRAVENOUS at 07:28

## 2024-06-24 RX ADMIN — SODIUM CHLORIDE 5 MCG/MIN: 9 INJECTION, SOLUTION INTRAVENOUS at 11:53

## 2024-06-24 RX ADMIN — FLUCONAZOLE 200 MG: 2 INJECTION, SOLUTION INTRAVENOUS at 15:44

## 2024-06-24 RX ADMIN — IPRATROPIUM BROMIDE AND ALBUTEROL SULFATE 1 DOSE: 2.5; .5 SOLUTION RESPIRATORY (INHALATION) at 15:54

## 2024-06-24 RX ADMIN — POTASSIUM CHLORIDE 10 MEQ: 7.46 INJECTION, SOLUTION INTRAVENOUS at 07:54

## 2024-06-24 RX ADMIN — SODIUM CHLORIDE 1000 ML: 9 INJECTION, SOLUTION INTRAVENOUS at 17:34

## 2024-06-24 RX ADMIN — ROCURONIUM BROMIDE 100 MG: 10 INJECTION, SOLUTION INTRAVENOUS at 07:25

## 2024-06-24 RX ADMIN — METRONIDAZOLE 500 MG: 500 INJECTION, SOLUTION INTRAVENOUS at 14:38

## 2024-06-24 RX ADMIN — DEXMEDETOMIDINE 0.2 MCG: 100 INJECTION, SOLUTION INTRAVENOUS at 08:14

## 2024-06-24 RX ADMIN — Medication 25 MG: at 07:30

## 2024-06-24 RX ADMIN — PROPOFOL 100 MG: 10 INJECTION, EMULSION INTRAVENOUS at 07:26

## 2024-06-24 RX ADMIN — DEXTROSE AND SODIUM CHLORIDE: 5; 450 INJECTION, SOLUTION INTRAVENOUS at 13:49

## 2024-06-24 RX ADMIN — MIDODRINE HYDROCHLORIDE 10 MG: 5 TABLET ORAL at 16:55

## 2024-06-24 RX ADMIN — HYDROMORPHONE HYDROCHLORIDE 1 MG: 1 INJECTION, SOLUTION INTRAMUSCULAR; INTRAVENOUS; SUBCUTANEOUS at 09:24

## 2024-06-24 RX ADMIN — SODIUM CHLORIDE, SODIUM LACTATE, POTASSIUM CHLORIDE, CALCIUM CHLORIDE: 600; 310; 30; 20 INJECTION, SOLUTION INTRAVENOUS at 08:46

## 2024-06-24 RX ADMIN — BUPIVACAINE HYDROCHLORIDE AND EPINEPHRINE 80 MG: 2.5; 5 INJECTION, SOLUTION EPIDURAL; INFILTRATION; INTRACAUDAL; PERINEURAL at 09:33

## 2024-06-24 RX ADMIN — METRONIDAZOLE 500 MG: 500 SOLUTION INTRAVENOUS at 07:20

## 2024-06-24 RX ADMIN — SODIUM CHLORIDE, POTASSIUM CHLORIDE, SODIUM LACTATE AND CALCIUM CHLORIDE: 600; 310; 30; 20 INJECTION, SOLUTION INTRAVENOUS at 07:08

## 2024-06-24 RX ADMIN — MAGNESIUM SULFATE HEPTAHYDRATE 3 G: 500 INJECTION, SOLUTION INTRAMUSCULAR; INTRAVENOUS at 08:07

## 2024-06-24 RX ADMIN — ESMOLOL HYDROCHLORIDE 40 MG: 100 INJECTION, SOLUTION INTRAVENOUS at 07:46

## 2024-06-24 RX ADMIN — PROPOFOL 50 MG: 10 INJECTION, EMULSION INTRAVENOUS at 07:25

## 2024-06-24 RX ADMIN — MICONAZOLE NITRATE: 20 POWDER TOPICAL at 21:47

## 2024-06-24 RX ADMIN — PROPOFOL 150 MCG/KG/MIN: 10 INJECTION, EMULSION INTRAVENOUS at 07:27

## 2024-06-24 RX ADMIN — Medication 25 MG: at 08:07

## 2024-06-24 RX ADMIN — OXYCODONE HYDROCHLORIDE 5 MG: 5 TABLET ORAL at 22:06

## 2024-06-24 RX ADMIN — METOPROLOL TARTRATE 5 MG: 1 INJECTION, SOLUTION INTRAVENOUS at 07:42

## 2024-06-24 RX ADMIN — SODIUM CHLORIDE, POTASSIUM CHLORIDE, SODIUM LACTATE AND CALCIUM CHLORIDE: 600; 310; 30; 20 INJECTION, SOLUTION INTRAVENOUS at 08:46

## 2024-06-24 RX ADMIN — POTASSIUM CHLORIDE 10 MEQ: 7.46 INJECTION, SOLUTION INTRAVENOUS at 08:33

## 2024-06-24 RX ADMIN — CALCIUM CHLORIDE 1 G: 100 INJECTION INTRAVENOUS; INTRAVENTRICULAR at 08:33

## 2024-06-24 RX ADMIN — SODIUM CHLORIDE, POTASSIUM CHLORIDE, SODIUM LACTATE AND CALCIUM CHLORIDE: 600; 310; 30; 20 INJECTION, SOLUTION INTRAVENOUS at 11:54

## 2024-06-24 RX ADMIN — CEFOXITIN SODIUM 2000 MG: 2 POWDER, FOR SOLUTION INTRAVENOUS at 13:56

## 2024-06-24 RX ADMIN — ALBUMIN HUMAN 25 G: 0.25 SOLUTION INTRAVENOUS at 09:54

## 2024-06-24 RX ADMIN — POTASSIUM CHLORIDE, DEXTROSE MONOHYDRATE AND SODIUM CHLORIDE: 150; 5; 900 INJECTION, SOLUTION INTRAVENOUS at 05:32

## 2024-06-24 RX ADMIN — ONDANSETRON 4 MG: 2 INJECTION INTRAMUSCULAR; INTRAVENOUS at 07:25

## 2024-06-24 RX ADMIN — PHENYLEPHRINE HYDROCHLORIDE 0.01 MCG: 10 INJECTION INTRAVENOUS at 07:29

## 2024-06-24 RX ADMIN — CEFOXITIN SODIUM 2000 MG: 2 POWDER, FOR SOLUTION INTRAVENOUS at 21:46

## 2024-06-24 RX ADMIN — Medication 25 MG: at 07:25

## 2024-06-24 RX ADMIN — Medication 25 MG: at 07:45

## 2024-06-24 RX ADMIN — DEXAMETHASONE SODIUM PHOSPHATE 20 MG: 10 INJECTION INTRAMUSCULAR; INTRAVENOUS at 09:34

## 2024-06-24 RX ADMIN — SODIUM CHLORIDE, SODIUM LACTATE, POTASSIUM CHLORIDE, CALCIUM CHLORIDE: 600; 310; 30; 20 INJECTION, SOLUTION INTRAVENOUS at 07:29

## 2024-06-24 RX ADMIN — Medication 2000 MG: at 07:20

## 2024-06-24 RX ADMIN — DEXMEDETOMIDINE 20 MCG: 100 INJECTION, SOLUTION INTRAVENOUS at 07:27

## 2024-06-24 RX ADMIN — SODIUM CHLORIDE, POTASSIUM CHLORIDE, SODIUM LACTATE AND CALCIUM CHLORIDE: 600; 310; 30; 20 INJECTION, SOLUTION INTRAVENOUS at 11:23

## 2024-06-24 RX ADMIN — SODIUM CHLORIDE 450 ML: 9 INJECTION, SOLUTION INTRAVENOUS at 13:45

## 2024-06-24 RX ADMIN — SODIUM CHLORIDE, POTASSIUM CHLORIDE, SODIUM LACTATE AND CALCIUM CHLORIDE: 600; 310; 30; 20 INJECTION, SOLUTION INTRAVENOUS at 07:29

## 2024-06-24 RX ADMIN — PHENYLEPHRINE HYDROCHLORIDE 200 MCG: 10 INJECTION INTRAVENOUS at 07:26

## 2024-06-24 RX ADMIN — Medication: at 16:46

## 2024-06-24 RX ADMIN — SUCRALFATE 1 G: 1 TABLET ORAL at 05:40

## 2024-06-24 RX ADMIN — SUGAMMADEX 200 MG: 100 INJECTION, SOLUTION INTRAVENOUS at 09:51

## 2024-06-24 RX ADMIN — CALCIUM CHLORIDE 1 G: 100 INJECTION INTRAVENOUS; INTRAVENTRICULAR at 07:54

## 2024-06-24 RX ADMIN — METRONIDAZOLE 500 MG: 500 INJECTION, SOLUTION INTRAVENOUS at 22:20

## 2024-06-24 RX ADMIN — DEXTROSE AND SODIUM CHLORIDE: 5; 450 INJECTION, SOLUTION INTRAVENOUS at 19:31

## 2024-06-24 RX ADMIN — POTASSIUM CHLORIDE 10 MEQ: 7.46 INJECTION, SOLUTION INTRAVENOUS at 05:36

## 2024-06-24 RX ADMIN — ATROPINE SULFATE 1 MG: 1 INJECTION, SOLUTION INTRAVENOUS at 08:14

## 2024-06-24 ASSESSMENT — PAIN SCALES - GENERAL
PAINLEVEL_OUTOF10: 4
PAINLEVEL_OUTOF10: 6
PAINLEVEL_OUTOF10: 0

## 2024-06-24 ASSESSMENT — PAIN - FUNCTIONAL ASSESSMENT: PAIN_FUNCTIONAL_ASSESSMENT: ADULT NONVERBAL PAIN SCALE (NPVS)

## 2024-06-24 ASSESSMENT — ENCOUNTER SYMPTOMS: SHORTNESS OF BREATH: 1

## 2024-06-24 ASSESSMENT — PAIN DESCRIPTION - LOCATION: LOCATION: ABDOMEN

## 2024-06-24 NOTE — PROGRESS NOTES
Advance directive papers from patient's family member. Copies made and placed in chart. Original papers returned to family member.

## 2024-06-24 NOTE — PROGRESS NOTES
Heavenly Rainey, PPatient Assessment complete. 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   Vitals:    06/24/24 1445   BP: (!) 116/57   Pulse: 72   Resp: 11   Temp:    SpO2: 98%   . Patients home meds are   Prior to Admission medications    Medication Sig Start Date End Date Taking? Authorizing Provider   magnesium oxide (MAG-OX) 400 (240 Mg) MG tablet  5/1/24   Lakeisha Vizcarra MD   ASPERCREME LIDOCAINE 4 % CREA  5/8/24   Lakeisha Vizcarra MD   LIDOCAINE PAIN RELIEF 4 % external patch  4/15/24   Lakeisha Vizcarra MD   acetaminophen (TYLENOL) 500 MG tablet Take 2 tablets by mouth every 6 hours as needed for Fever or Pain 12/8/23 12/7/24  Lakeisha Vizcarra MD   albuterol sulfate HFA (PROVENTIL;VENTOLIN;PROAIR) 108 (90 Base) MCG/ACT inhaler Inhale 2 puffs into the lungs every 4 hours as needed for Shortness of Breath or Wheezing 5/30/23   Lakeisha Vizcarra MD   albuterol (PROVENTIL) (2.5 MG/3ML) 0.083% nebulizer solution Inhale 3 mLs into the lungs every 4 hours as needed for Shortness of Breath or Wheezing 3/16/23   Lakeisha Vizcarra MD   amiodarone (CORDARONE) 200 MG tablet Take 1 tablet by mouth daily 11/23/22   Lakeisha Vizcarra MD   apixaban (ELIQUIS) 2.5 MG TABS tablet Take 2 tablets by mouth 2 times daily    Lakeisha Vizcarra MD   ascorbic acid (VITAMIN C) 250 MG tablet Take 1 tablet by mouth daily    Lakeisha Vizcarra MD   docusate (COLACE, DULCOLAX) 100 MG CAPS Take 100 mg by mouth daily    Lakeisha Vizcarra MD   fluticasone-umeclidin-vilant (TRELEGY ELLIPTA) 100-62.5-25 MCG/ACT AEPB inhaler Inhale 1 puff into the lungs daily    Lakeisha Vizcarra MD   furosemide (LASIX) 40 MG tablet Take 1 tablet by mouth daily 12/8/23   Lakeisha Vizcarra MD   pantoprazole (PROTONIX) 40 MG tablet Take 1 tablet by mouth daily 4/3/23   Provider, Historical,

## 2024-06-24 NOTE — PLAN OF CARE
Problem: Discharge Planning  Goal: Discharge to home or other facility with appropriate resources  6/24/2024 1303 by Kee Thacker RN  Outcome: Progressing  6/24/2024 0433 by Cecilia Wray RN  Outcome: Progressing     Problem: Pain  Goal: Verbalizes/displays adequate comfort level or baseline comfort level  6/24/2024 1303 by Kee Thacker RN  Outcome: Progressing  6/24/2024 0433 by Cecilia Wray RN  Outcome: Progressing     Problem: ABCDS Injury Assessment  Goal: Absence of physical injury  6/24/2024 1303 by Kee Thacker RN  Outcome: Progressing  6/24/2024 0433 by Cecilia Wray RN  Outcome: Progressing     Problem: Safety - Adult  Goal: Free from fall injury  6/24/2024 1303 by Kee Thacker RN  Outcome: Progressing  6/24/2024 0433 by Cecilia Wray RN  Outcome: Progressing     Problem: Gastrointestinal - Adult  Goal: Minimal or absence of nausea and vomiting  6/24/2024 1303 by Kee Thacker RN  Outcome: Progressing  6/24/2024 0433 by Cecilia Wray RN  Outcome: Progressing  Goal: Maintains or returns to baseline bowel function  6/24/2024 1303 by Kee Thacker RN  Outcome: Progressing  6/24/2024 0433 by Cecilia Wray RN  Outcome: Progressing     Problem: Skin/Tissue Integrity  Goal: Absence of new skin breakdown  Description: 1.  Monitor for areas of redness and/or skin breakdown  2.  Assess vascular access sites hourly  3.  Every 4-6 hours minimum:  Change oxygen saturation probe site  4.  Every 4-6 hours:  If on nasal continuous positive airway pressure, respiratory therapy assess nares and determine need for appliance change or resting period.  6/24/2024 1303 by Kee Thacker RN  Outcome: Progressing  6/24/2024 0433 by Cecilia Wray RN  Outcome: Progressing     Problem: Chronic Conditions and Co-morbidities  Goal: Patient's chronic conditions and co-morbidity symptoms are monitored and maintained or improved  6/24/2024 1303 by Kee Thacker RN  Outcome:  Progressing  6/24/2024 0433 by Cecilia Wray, RN  Outcome: Progressing

## 2024-06-24 NOTE — ANESTHESIA PRE PROCEDURE
Date    APPENDECTOMY N/A     CHOLECYSTECTOMY N/A     HYSTERECTOMY (CERVIX STATUS UNKNOWN) N/A     TONSILLECTOMY AND ADENOIDECTOMY N/A     TRANSESOPHAGEAL ECHOCARDIOGRAM N/A 10/2018    during cardioversion due to A Fib (NOT during a heart cath) @ [facility] in [facility]       Social History:    Social History     Tobacco Use    Smoking status: Former    Smokeless tobacco: Not on file   Substance Use Topics    Alcohol use: Not on file                                Counseling given: Not Answered      Vital Signs (Current):   Vitals:    06/23/24 1826 06/23/24 2307 06/24/24 0308 06/24/24 0544   BP: 122/81 (!) 119/93 102/71 134/72   Pulse: 87 95 (!) 116 89   Resp: 21 19 17 20   Temp: 36.8 °C (98.3 °F) 37.4 °C (99.4 °F) 36.5 °C (97.7 °F) 36.8 °C (98.3 °F)   TempSrc: Temporal Tympanic Temporal Tympanic   SpO2: 97% 95% 95% 95%   Weight:   88.5 kg (195 lb)    Height:                                                  BP Readings from Last 3 Encounters:   06/24/24 134/72   05/15/24 122/82   12/09/16 (!) 145/78       NPO Status: Time of last liquid consumption: 2355                        Time of last solid consumption: 2355                        Date of last liquid consumption: 06/23/24                        Date of last solid food consumption: 06/23/24    BMI:   Wt Readings from Last 3 Encounters:   06/24/24 88.5 kg (195 lb)   05/08/24 72.9 kg (160 lb 11.5 oz)   12/09/16 91.2 kg (201 lb)     Body mass index is 33.47 kg/m².    CBC:   Lab Results   Component Value Date/Time    WBC 10.4 06/24/2024 04:10 AM    RBC 3.79 06/24/2024 04:10 AM    HGB 10.6 06/24/2024 04:10 AM    HCT 32.9 06/24/2024 04:10 AM    MCV 86.8 06/24/2024 04:10 AM    RDW 14.9 06/24/2024 04:10 AM     06/24/2024 04:10 AM       CMP:   Lab Results   Component Value Date/Time     06/24/2024 04:10 AM    K 3.5 06/24/2024 04:10 AM     06/24/2024 04:10 AM    CO2 18 06/24/2024 04:10 AM    BUN 9 06/24/2024 04:10 AM    CREATININE 0.8 06/24/2024

## 2024-06-24 NOTE — ANESTHESIA POSTPROCEDURE EVALUATION
Department of Anesthesiology  Postprocedure Note    Patient: Soumya Smith  MRN: [id number]  YOB: 1933  Date of evaluation: 6/24/2024    Procedure Summary       Date: 06/24/24 Room / Location: [address]    Anesthesia Start: 0720 Anesthesia Stop: 0958    Procedure: Open Right Hemicolectomy & flexible sigmoidoscopy (Abdomen) Diagnosis:       Malignant neoplasm of hepatic flexure (HCC)      (Malignant neoplasm of hepatic flexure (HCC) [C18.3])    Surgeons: Lamont Miranda MD Responsible Provider: Arvind Lyon APRN - CRNA    Anesthesia Type: General, TIVA ASA Status: 3            Anesthesia Type: General, TIVA    Doris Phase I: Doris Score: 8    Doris Phase II:      Anesthesia Post Evaluation    Patient location during evaluation: PACU  Level of consciousness: sleepy but conscious  Airway patency: patent  Nausea & Vomiting: no nausea and no vomiting  Cardiovascular status: hemodynamically stable  Respiratory status: spontaneous ventilation  Hydration status: stable  Multimodal analgesia pain management approach  Pain management: satisfactory to patient    No notable events documented.

## 2024-06-24 NOTE — H&P
Soumya Smith is a 89 y.o. female      CC:    Hepatic colon cance    HISTORY OF PRESENT ILLNESS:    Pt is 89 yo with know hepatic flexure colon cancer from CS 1 month ago for anemia.  Was then admitted to [facility] with RUQ pain and rectal bleeding.  Had long discussion with pt and family about hospice care vs surgical resection.  Pt was adamant about surgery.  We discussed risks and benefits.  Without surgery , will probably die from the cancer from acute bleeding and or perforation or obstruction.  Just not sure how long that would be.  But could maintain for a while, and then consider comfort care.  Surgery is risky at her age and condition, but would remove the mass and hopefully avoid the acute complications of the cancer mentioned above.  Probably would not be treatment that would prolong her life if metastasized.  She was aware of the above as well as the family and pt still decided to proceed with surgery, knowing she could pass away from the surgery or even during the surgery.        Review of Systems:    General:  Fever: Negative  Weight Change:Negative  Night Sweats: Negative    Eye:  Blurry Vision:Negative  Double Vision: Negative    Ent:  Headaches: Negative  Sore throat: Negative    Allergy/Immunology:  Hives: Negative  Latex allergy: Negative    Hematology/Lymphatic:  Bleeding Problems: Negative  Blood Clots: Negative  Swollen Lymph Nodes: Negative    Lungs:  Cough: Negative  SOB: Negative  Wheezing:Negative    Cardiovascular:  Chest Pain: Negative  Palpitations:Negative    GI:   Decreased Appetite: Negative  Heartburn: Negative  Dysphagia: Negative  Nausea/Vomiting: Negative  Abdominal Pain: Negative  Change in Bowels:Negative  Constipation: Negative  Diarrhea: Negative  Rectal Bleeding: Negative    :   Dysuria: Negative  Increase Urinary Frequency/Urgency: Negative    Neuro:  Seizures: Negative  Confusion: Negative        PAST MEDICAL HISTORY:      History reviewed. No pertinent family 
Calcified granuloma at the right lung apex.     Right perihilar opacification, possibly asymmetric edema or infiltrate. Small right effusion.       EKG: atrial fibrillation (chronic)     Prophylaxis:   DVT with  [] lovenox        [] heparin        [] Scd        [x] none: as per surgery    Assessment and Plan   Colonic lesion/for surgery  A fib with rate control/eliquis on hold  Dm /sugar under control  Hypomagnesium/replace/recheck  Pt high risk for surgery due to  age and co morbidity  Discussed with pt and family/full code for surgery    Patient Active Problem List   Diagnosis Code    Type 2 diabetes mellitus (HCC) E11.9    Hypertension I10    Hyperlipidemia E78.5    Acute midline low back pain without sciatica M54.50    Chronic low back pain without sciatica M54.50, G89.29    Altered mental status R41.82    Anemia due to vitamin B12 deficiency D51.9    Arthritis M19.90    Atrial fibrillation with RVR ([facility]) I48.91    COPD exacerbation ([facility]) J44.1    Coronary atherosclerosis I25.10    Depression, recurrent ([facility]) F33.9    Frequent falls R29.6    GERD (gastroesophageal reflux disease) K21.9    Hypomagnesemia E83.42    Physical deconditioning R53.81    Pneumonia of right lower lobe due to infectious organism J18.9    Shortness of breath R06.02    Syncope and collapse R55    Vitamin D deficiency E55.9    Weakness R53.1    BRBPR (bright red blood per rectum) K62.5    Colon cancer (HCC) C18.9       Anticipated Disposition upon discharge: [] Home                                                                         [] Home with Home Health                                                                         [] Skilled Nursing Facility                                                                         [] Long-Term Acute [facility] Hospital          Patient is admitted as inpatient status because of co-morbidities listed above, severity of signs and symptoms as outlined, requirement for current medical therapies and

## 2024-06-24 NOTE — PROGRESS NOTES
metoprolol, ondansetron, sodium chloride flush, sodium chloride, potassium chloride **OR** potassium alternative oral replacement **OR** potassium chloride, magnesium sulfate, ondansetron **OR** ondansetron, oxyCODONE **OR** oxyCODONE, HYDROmorphone, [MAR Hold] acetaminophen    Objective:  Labs:  CBC with Differential:    Lab Results   Component Value Date/Time    WBC 10.4 06/24/2024 04:10 AM    RBC 3.79 06/24/2024 04:10 AM    HGB 10.6 06/24/2024 04:10 AM    HCT 32.9 06/24/2024 04:10 AM     06/24/2024 04:10 AM    MCV 86.8 06/24/2024 04:10 AM    MCH 28.0 06/24/2024 04:10 AM    MCHC 32.2 06/24/2024 04:10 AM    RDW 14.9 06/24/2024 04:10 AM    LYMPHOPCT 16 06/24/2024 04:10 AM    MONOPCT 6 06/24/2024 04:10 AM    EOSPCT 3 06/24/2024 04:10 AM    BASOPCT 1 06/24/2024 04:10 AM    MONOSABS 0.60 06/24/2024 04:10 AM    EOSABS 0.28 06/24/2024 04:10 AM    BASOSABS 0.05 06/24/2024 04:10 AM     CMP:    Lab Results   Component Value Date/Time     06/24/2024 04:10 AM    K 3.5 06/24/2024 04:10 AM     06/24/2024 04:10 AM    CO2 18 06/24/2024 04:10 AM    BUN 9 06/24/2024 04:10 AM    CREATININE 0.8 06/24/2024 04:10 AM    LABGLOM 70 06/24/2024 04:10 AM    GLUCOSE 114 06/24/2024 04:10 AM    CALCIUM 7.7 06/24/2024 04:10 AM     BMP:    Lab Results   Component Value Date/Time     06/24/2024 04:10 AM    K 3.5 06/24/2024 04:10 AM     06/24/2024 04:10 AM    CO2 18 06/24/2024 04:10 AM    BUN 9 06/24/2024 04:10 AM    CREATININE 0.8 06/24/2024 04:10 AM    CALCIUM 7.7 06/24/2024 04:10 AM    LABGLOM 70 06/24/2024 04:10 AM    GLUCOSE 114 06/24/2024 04:10 AM     Magnesium:    Lab Results   Component Value Date/Time    MG 1.6 06/24/2024 04:10 AM     VBG:  No results found for: \"PHVEN\", \"QBC0OOK\", \"BEVEN\", \"O8RUCMLP\"  HgBA1c:  No results found for: \"LABA1C\"        Physical Exam:  Vitals: BP (!) 111/56   Pulse 71   Temp 97.6 °F (36.4 °C) (Temporal)   Resp 11   Ht 1.626 m (5' 4\")   Wt 88.5 kg (195 lb)   SpO2 98%   BMI  33.47 kg/m²   24 hour intake/output:  Intake/Output Summary (Last 24 hours) at 6/24/2024 1448  Last data filed at 6/24/2024 1200  Gross per 24 hour   Intake 5503.42 ml   Output 850 ml   Net 4653.42 ml     Last 3 weights:  Wt Readings from Last 3 Encounters:   06/24/24 88.5 kg (195 lb)   05/08/24 72.9 kg (160 lb 11.5 oz)   12/09/16 91.2 kg (201 lb)     HEENT: Normocephalic and Atraumatic  Neck: Supple, No Masses, Tenderness, Nodularity, and No Lymphadenopathy  Chest/Lungs: Distant Breath Sounds  Cardiac: Irregularly Irregular----III/VI CELIO  GI/Abdomen: Bowel Sounds Absent and Tenderness--diffuse--expected---dressing CDI  :  soler---pale yellow  EXT/Skin: No Cyanosis, No Clubbing, and Edema Present  Neuro:  post operative grogginess----generalized weakness      Assessment:    Principal Problem:    Colon cancer (HCC)  Active Problems:    S/P right hemicolectomy    Abdominal mass, unspecified abdominal location  Resolved Problems:    * No resolved hospital problems. *    KRYSTAL CHAUDHARY.  89  WF  [Jannet Wallace NP;  manju Miranda ;  [facility]]   SUPPLEMENTAL OXYGEN  DNR-CC   AMIODARONE   LEVOPHED gtt---6.24.2024  midodrine     Transfer form     Anti-infectives:    Mefoxin IV,  Flagyl IV,  Diflucan IV. Mycostatin topical     POD _____ right hemicolectomy---6.24.2024---Miranda  Adenocarcinoma hepatic flexure--partially obstructing  RUQ pain  Hypotension  Metabolic acidosis             VBG---6.24.2024---7.22----42--44--70.6---17.4          EKG---6.24.2024---atrial fibrillation--72--RBBB--LAHB--septal infarction           CT AP---6.20.2024---see below---sigmoid diverticulitis---increased pelvic wall fluid          CT AP---6.17.2024---mild sigmoid diverticulitis--hypodensity right lobe liver             Colonoscopy---6.7.2024---mass @ 100 cm--transverse colon           EGD----6.7.2024--polyp with polypectomy    Diverticulitis--sigmoid---acute--6.17.2024  Atelectasis     Atrial

## 2024-06-24 NOTE — ADDENDUM NOTE
Addendum  created 06/24/24 1040 by Arvind Lyon APRN - CRNA    Intraprocedure Meds edited, Orders acknowledged in Narrator

## 2024-06-24 NOTE — ANESTHESIA PROCEDURE NOTES
Peripheral Block    Patient location during procedure: OR  Reason for block: post-op pain management and at surgeon's request  Start time: 6/24/2024 9:30 AM  End time: 6/24/2024 9:33 AM  Staffing  Performed: resident/CRNA   Resident/CRNA: Arvind Lyon APRN - CRNA  Performed by: Arvind Lyon APRN - CRNA  Authorized by: Arvind Lyon APRN - CRNA    Preanesthetic Checklist  Completed: patient identified, IV checked, site marked, risks and benefits discussed, surgical/procedural consents, equipment checked, pre-op evaluation, timeout performed, anesthesia consent given, oxygen available, monitors applied/VS acknowledged, fire risk safety assessment completed and verbalized and blood product R/B/A discussed and consented  Peripheral Block   Patient position: supine  Prep: ChloraPrep  Provider prep: mask and sterile gloves  Patient monitoring: cardiac monitor, continuous pulse ox, continuous capnometry, frequent blood pressure checks, IV access and oxygen  Block type: TAP  Laterality: bilateral  Injection technique: single-shot  Guidance: ultrasound guided    Needle   Needle type: insulated echogenic nerve stimulator needle   Needle gauge: 20 G  Needle localization: ultrasound guidance and anatomical landmarks  Assessment   Injection assessment: negative aspiration for heme, local visualized surrounding nerve on ultrasound and no intravascular symptoms  Slow fractionated injection: yes  Hemodynamics: stable  Outcomes: uncomplicated and patient tolerated procedure well            
Peripheral Block    Patient location during procedure: OR  Reason for block: post-op pain management and at surgeon's request  Start time: 6/24/2024 9:33 AM  End time: 6/24/2024 9:37 AM  Staffing  Performed: resident/CRNA   Resident/CRNA: Arvind Lyon APRN - CRNA  Performed by: Arvind Lyon APRN - CRNA  Authorized by: Arvind Lyon APRN - CRNA    Preanesthetic Checklist  Completed: patient identified, IV checked, site marked, risks and benefits discussed, surgical/procedural consents, equipment checked, pre-op evaluation, timeout performed, anesthesia consent given, oxygen available, monitors applied/VS acknowledged, fire risk safety assessment completed and verbalized and blood product R/B/A discussed and consented  Peripheral Block   Patient position: supine  Prep: ChloraPrep  Provider prep: mask and sterile gloves  Patient monitoring: cardiac monitor, continuous pulse ox, continuous capnometry, frequent blood pressure checks, IV access and oxygen  Block type: Rectus sheath  Laterality: bilateral  Injection technique: single-shot  Guidance: ultrasound guided    Needle   Needle type: insulated echogenic nerve stimulator needle   Needle gauge: 20 G  Needle localization: anatomical landmarks and ultrasound guidance  Assessment   Injection assessment: negative aspiration for heme, local visualized surrounding nerve on ultrasound and no intravascular symptoms  Slow fractionated injection: yes  Hemodynamics: stable  Outcomes: patient tolerated procedure well and uncomplicated            
Discharged

## 2024-06-24 NOTE — OP NOTE
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]    Post-Op Diagnosis:    Malignant neoplasm of proximal transverse colon       Procedure(s):  Open Right Hemicolectomy & flexible sigmoidoscopy    Surgeon(s):  Lamont Miranda MD    Assistant:   Physician Assistant: Neri Paulino PA-C    Anesthesia: General    Estimated Blood Loss (mL): less than 100     Complications: None    Specimens:   ID Type Source Tests Collected by Time Destination   A : Extended Right Colon Tissue Colon SURGICAL PATHOLOGY Lamont Miranda MD 6/24/2024 0825        Implants:  * No implants in log *      Drains:   Closed/Suction Drain Right Abdomen;RLQ Bulb (Active)   Site Description Clean, dry & intact 06/24/24 0931   Dressing Status Clean, dry & intact 06/24/24 0931   Drainage Appearance Bloody 06/24/24 0931   Drain Status To bulb suction 06/24/24 0931       Urinary Catheter 06/23/24 (Active)   Catheter Indications Urinary retention (acute or chronic), continuous bladder irrigation or bladder outlet obstruction 06/24/24 0656   Site Assessment No urethral drainage 06/24/24 0656   Urine Color Nery 06/24/24 0656   Urine Appearance Clear 06/24/24 0656   Collection Container Standard 06/24/24 0656   Securement Method Leg strap 06/24/24 0656   Catheter Care  Perineal wipes 06/23/24 2103   Catheter Best Practices  Drainage tube clipped to bed;Catheter secured to thigh;Bag below bladder;Bag not on floor;Lack of dependent loop in tubing;Drainage bag less than half full 06/24/24 0656   Status Patent 06/24/24 0656   Output (mL) 300 mL 06/24/24 0544       Findings:  Infection Present At Time Of Surgery (PATOS) (choose all levels that have infection present):  - Organ Space infection (below fascia) present as evidenced by fluid consistent with infection  Other Findings: tumor in proximal transverse

## 2024-06-24 NOTE — PROGRESS NOTES
Patient with intermittent Atrial fib RVR 130s-140s for approximately 10 minutes. Patient completely asymptomatic entire time.   Nany Evans, JENNIFER aware. PRN lopressor for sustained HR >120.

## 2024-06-24 NOTE — PROGRESS NOTES
Pt's soler output at 125ml, CINDI output at 140ml. Lung sounds with rales to the bases bilaterally.  Dr. Galvez updated.  Order received and administered for a 1L NS bolus to infuse over 2 hours.  Will continue to monitor.  RAÚL Thacker RN

## 2024-06-25 LAB
ALBUMIN SERPL-MCNC: 2.1 G/DL (ref 3.5–5.2)
ALBUMIN/GLOB SERPL: 1.1 {RATIO} (ref 1–2.5)
ALP SERPL-CCNC: 136 U/L (ref 35–104)
ALT SERPL-CCNC: 27 U/L (ref 5–33)
ANION GAP SERPL CALCULATED.3IONS-SCNC: 11 MMOL/L (ref 9–17)
AST SERPL-CCNC: 54 U/L
BASOPHILS # BLD: <0.03 K/UL (ref 0–0.2)
BASOPHILS NFR BLD: 0 % (ref 0–2)
BILIRUB SERPL-MCNC: 0.3 MG/DL (ref 0.3–1.2)
BUN SERPL-MCNC: 10 MG/DL (ref 8–23)
BUN/CREAT SERPL: 10 (ref 9–20)
CALCIUM SERPL-MCNC: 8.2 MG/DL (ref 8.6–10.4)
CHLORIDE SERPL-SCNC: 109 MMOL/L (ref 98–107)
CO2 SERPL-SCNC: 15 MMOL/L (ref 20–31)
CREAT SERPL-MCNC: 1 MG/DL (ref 0.5–0.9)
EKG ATRIAL RATE: 72 BPM
EKG P AXIS: -111 DEGREES
EKG P-R INTERVAL: 212 MS
EKG Q-T INTERVAL: 454 MS
EKG QRS DURATION: 144 MS
EKG QTC CALCULATION (BAZETT): 497 MS
EKG R AXIS: -59 DEGREES
EKG T AXIS: -30 DEGREES
EKG VENTRICULAR RATE: 72 BPM
EOSINOPHIL # BLD: <0.03 K/UL (ref 0–0.44)
EOSINOPHILS RELATIVE PERCENT: 0 % (ref 1–4)
ERYTHROCYTE [DISTWIDTH] IN BLOOD BY AUTOMATED COUNT: 15.2 % (ref 11.8–14.4)
GFR, ESTIMATED: 53 ML/MIN/1.73M2
GLUCOSE SERPL-MCNC: 182 MG/DL (ref 70–99)
HCT VFR BLD AUTO: 30.1 % (ref 36.3–47.1)
HGB BLD-MCNC: 9.4 G/DL (ref 11.9–15.1)
IMM GRANULOCYTES # BLD AUTO: 0.13 K/UL (ref 0–0.3)
IMM GRANULOCYTES NFR BLD: 1 %
LYMPHOCYTES NFR BLD: 1.5 K/UL (ref 1.1–3.7)
LYMPHOCYTES RELATIVE PERCENT: 9 % (ref 24–43)
MCH RBC QN AUTO: 27.8 PG (ref 25.2–33.5)
MCHC RBC AUTO-ENTMCNC: 31.2 G/DL (ref 25.2–33.5)
MCV RBC AUTO: 89.1 FL (ref 82.6–102.9)
MONOCYTES NFR BLD: 0.54 K/UL (ref 0.1–1.2)
MONOCYTES NFR BLD: 3 % (ref 3–12)
NEUTROPHILS NFR BLD: 87 % (ref 36–65)
NEUTS SEG NFR BLD: 13.75 K/UL (ref 1.5–8.1)
NRBC BLD-RTO: 0 PER 100 WBC
PLATELET # BLD AUTO: 300 K/UL (ref 138–453)
PMV BLD AUTO: 9.4 FL (ref 8.1–13.5)
POTASSIUM SERPL-SCNC: 4.2 MMOL/L (ref 3.7–5.3)
PROT SERPL-MCNC: 4 G/DL (ref 6.4–8.3)
RBC # BLD AUTO: 3.38 M/UL (ref 3.95–5.11)
RBC # BLD: ABNORMAL 10*6/UL
SODIUM SERPL-SCNC: 135 MMOL/L (ref 135–144)
WBC OTHER # BLD: 15.9 K/UL (ref 3.5–11.3)

## 2024-06-25 PROCEDURE — 6370000000 HC RX 637 (ALT 250 FOR IP)

## 2024-06-25 PROCEDURE — 99024 POSTOP FOLLOW-UP VISIT: CPT | Performed by: PHYSICIAN ASSISTANT

## 2024-06-25 PROCEDURE — 6360000002 HC RX W HCPCS

## 2024-06-25 PROCEDURE — 36415 COLL VENOUS BLD VENIPUNCTURE: CPT

## 2024-06-25 PROCEDURE — 80053 COMPREHEN METABOLIC PANEL: CPT

## 2024-06-25 PROCEDURE — 97162 PT EVAL MOD COMPLEX 30 MIN: CPT

## 2024-06-25 PROCEDURE — 2580000003 HC RX 258

## 2024-06-25 PROCEDURE — 94640 AIRWAY INHALATION TREATMENT: CPT

## 2024-06-25 PROCEDURE — 2700000000 HC OXYGEN THERAPY PER DAY

## 2024-06-25 PROCEDURE — 2060000000 HC ICU INTERMEDIATE R&B

## 2024-06-25 PROCEDURE — 2500000003 HC RX 250 WO HCPCS

## 2024-06-25 PROCEDURE — 51798 US URINE CAPACITY MEASURE: CPT

## 2024-06-25 PROCEDURE — 85025 COMPLETE CBC W/AUTO DIFF WBC: CPT

## 2024-06-25 PROCEDURE — 99231 SBSQ HOSP IP/OBS SF/LOW 25: CPT | Performed by: INTERNAL MEDICINE

## 2024-06-25 PROCEDURE — 2580000003 HC RX 258: Performed by: INTERNAL MEDICINE

## 2024-06-25 PROCEDURE — 97166 OT EVAL MOD COMPLEX 45 MIN: CPT | Performed by: OCCUPATIONAL THERAPIST

## 2024-06-25 PROCEDURE — 94761 N-INVAS EAR/PLS OXIMETRY MLT: CPT

## 2024-06-25 RX ORDER — 0.9 % SODIUM CHLORIDE 0.9 %
1000 INTRAVENOUS SOLUTION INTRAVENOUS ONCE
Status: COMPLETED | OUTPATIENT
Start: 2024-06-25 | End: 2024-06-25

## 2024-06-25 RX ADMIN — MIDODRINE HYDROCHLORIDE 10 MG: 5 TABLET ORAL at 17:14

## 2024-06-25 RX ADMIN — DOCUSATE SODIUM 100 MG: 100 CAPSULE, LIQUID FILLED ORAL at 09:14

## 2024-06-25 RX ADMIN — SERTRALINE 100 MG: 50 TABLET, FILM COATED ORAL at 09:14

## 2024-06-25 RX ADMIN — Medication: at 18:35

## 2024-06-25 RX ADMIN — MIDODRINE HYDROCHLORIDE 10 MG: 5 TABLET ORAL at 09:14

## 2024-06-25 RX ADMIN — DEXTROSE AND SODIUM CHLORIDE: 5; 450 INJECTION, SOLUTION INTRAVENOUS at 21:27

## 2024-06-25 RX ADMIN — MICONAZOLE NITRATE: 20 POWDER TOPICAL at 09:14

## 2024-06-25 RX ADMIN — PANTOPRAZOLE SODIUM 40 MG: 40 TABLET, DELAYED RELEASE ORAL at 09:14

## 2024-06-25 RX ADMIN — CEFOXITIN SODIUM 2000 MG: 2 POWDER, FOR SOLUTION INTRAVENOUS at 21:57

## 2024-06-25 RX ADMIN — AMIODARONE HYDROCHLORIDE 200 MG: 200 TABLET ORAL at 09:14

## 2024-06-25 RX ADMIN — MOMETASONE FUROATE AND FORMOTEROL FUMARATE DIHYDRATE 2 PUFF: 200; 5 AEROSOL RESPIRATORY (INHALATION) at 07:50

## 2024-06-25 RX ADMIN — SUCRALFATE 1 G: 1 TABLET ORAL at 05:00

## 2024-06-25 RX ADMIN — SUCRALFATE 1 G: 1 TABLET ORAL at 17:14

## 2024-06-25 RX ADMIN — OXYCODONE HYDROCHLORIDE 10 MG: 5 TABLET ORAL at 12:10

## 2024-06-25 RX ADMIN — OXYCODONE HYDROCHLORIDE 5 MG: 5 TABLET ORAL at 03:59

## 2024-06-25 RX ADMIN — SUCRALFATE 1 G: 1 TABLET ORAL at 09:14

## 2024-06-25 RX ADMIN — Medication: at 21:24

## 2024-06-25 RX ADMIN — CEFOXITIN SODIUM 2000 MG: 2 POWDER, FOR SOLUTION INTRAVENOUS at 13:04

## 2024-06-25 RX ADMIN — DEXTROSE AND SODIUM CHLORIDE: 5; 450 INJECTION, SOLUTION INTRAVENOUS at 13:26

## 2024-06-25 RX ADMIN — IPRATROPIUM BROMIDE AND ALBUTEROL SULFATE 1 DOSE: 2.5; .5 SOLUTION RESPIRATORY (INHALATION) at 11:32

## 2024-06-25 RX ADMIN — MICONAZOLE NITRATE: 20 POWDER TOPICAL at 22:02

## 2024-06-25 RX ADMIN — SODIUM CHLORIDE, PRESERVATIVE FREE 10 ML: 5 INJECTION INTRAVENOUS at 22:03

## 2024-06-25 RX ADMIN — MIDODRINE HYDROCHLORIDE 10 MG: 5 TABLET ORAL at 11:55

## 2024-06-25 RX ADMIN — CEFOXITIN SODIUM 2000 MG: 2 POWDER, FOR SOLUTION INTRAVENOUS at 05:00

## 2024-06-25 RX ADMIN — ATORVASTATIN CALCIUM 40 MG: 40 TABLET, FILM COATED ORAL at 09:14

## 2024-06-25 RX ADMIN — METRONIDAZOLE 500 MG: 500 INJECTION, SOLUTION INTRAVENOUS at 05:56

## 2024-06-25 RX ADMIN — IPRATROPIUM BROMIDE AND ALBUTEROL SULFATE 1 DOSE: 2.5; .5 SOLUTION RESPIRATORY (INHALATION) at 16:03

## 2024-06-25 RX ADMIN — FLUCONAZOLE 200 MG: 2 INJECTION, SOLUTION INTRAVENOUS at 15:34

## 2024-06-25 RX ADMIN — SODIUM CHLORIDE 1000 ML: 9 INJECTION, SOLUTION INTRAVENOUS at 17:27

## 2024-06-25 RX ADMIN — METRONIDAZOLE 500 MG: 500 INJECTION, SOLUTION INTRAVENOUS at 14:03

## 2024-06-25 RX ADMIN — ONDANSETRON 4 MG: 4 TABLET, ORALLY DISINTEGRATING ORAL at 09:59

## 2024-06-25 RX ADMIN — IPRATROPIUM BROMIDE AND ALBUTEROL SULFATE 1 DOSE: 2.5; .5 SOLUTION RESPIRATORY (INHALATION) at 07:44

## 2024-06-25 ASSESSMENT — PAIN SCALES - WONG BAKER: WONGBAKER_NUMERICALRESPONSE: NO HURT

## 2024-06-25 ASSESSMENT — PAIN - FUNCTIONAL ASSESSMENT: PAIN_FUNCTIONAL_ASSESSMENT: PREVENTS OR INTERFERES SOME ACTIVE ACTIVITIES AND ADLS

## 2024-06-25 ASSESSMENT — PAIN DESCRIPTION - DESCRIPTORS
DESCRIPTORS: ACHING;SHARP
DESCRIPTORS: SHARP

## 2024-06-25 ASSESSMENT — PAIN SCALES - GENERAL
PAINLEVEL_OUTOF10: 7
PAINLEVEL_OUTOF10: 7
PAINLEVEL_OUTOF10: 4
PAINLEVEL_OUTOF10: 4
PAINLEVEL_OUTOF10: 6
PAINLEVEL_OUTOF10: 3

## 2024-06-25 ASSESSMENT — PAIN DESCRIPTION - LOCATION
LOCATION: ABDOMEN

## 2024-06-25 ASSESSMENT — PAIN DESCRIPTION - ORIENTATION
ORIENTATION: MID
ORIENTATION: MID

## 2024-06-25 NOTE — PROGRESS NOTES
rounding in PCU.    Assessment: Patient was recovering from a successful surgery and accompanied by her daughter (June).  Patient was planning to go first to physical therapy before returning to her nursing care facility.  Patient has the support of her children, great grandchildren and [employer] family.     06/25/24 1618   Encounter Summary   Encounter Overview/Reason Initial Encounter;Spiritual/Emotional Needs   Service Provided For Patient and family together   Referral/Consult From Rounding   Support System Children;Family members;Christianity/mariah community;Palliative Care   Last Encounter  06/25/24   Complexity of Encounter Low   Begin Time 1338   End Time  1347   Total Time Calculated 9 min   Spiritual/Emotional needs   Type Spiritual Support   Assessment/Intervention/Outcome   Assessment Calm;Coping;Hopeful   Intervention Active listening;Sustaining Presence/Ministry of presence;Prayer (assurance of)/[place]   Outcome Acceptance;Encouraged;Engaged in conversation;Expressed Gratitude;Peace;Receptive         Intervention: Engaged in conversation and active listening. Prayed with Patient and her daugher.     Outcome: Patient expressed appreciation for visit and offer of continued prayer.    Plan: Chaplains are available on site or on call 24/7 for spiritual and emotional support.    Electronically signed by Astrid Yanez on 6/25/2024 at 4:19 PM

## 2024-06-25 NOTE — PLAN OF CARE
Problem: Skin/Tissue Integrity - Adult  Goal: Skin integrity remains intact  Outcome: Progressing     Problem: Skin/Tissue Integrity - Adult  Goal: Incisions, wounds, or drain sites healing without S/S of infection  Outcome: Progressing     Problem: Genitourinary - Adult  Goal: Absence of urinary retention  Outcome: Progressing     Problem: Infection - Adult  Goal: Absence of infection at discharge  Outcome: Progressing

## 2024-06-25 NOTE — PROGRESS NOTES
General Surgery - Neri Paulino PA-C  Daily Progress Note  Pt Name: Soumya Smith  Medical Record Number: [id number]  Date of Birth 3/14/1933   Today's Date: 6/25/2024      SUBJECTIVE  Postop day 1 open right hemicolectomy  Doing pretty well, pressures are stable, oxygen 95 on 2 and half liters, no fevers  She denies any nausea, is having abdominal pain mostly over the incision  CINDI drain shows serosanguineous fluid      OBJECTIVE  CURRENT VITALS BP (!) 100/57   Pulse 85   Temp 99.1 °F (37.3 °C) (Tympanic)   Resp 16   Ht 1.626 m (5' 4\")   Wt 94.2 kg (207 lb 11.2 oz)   SpO2 95%   BMI 35.65 kg/m²   GENERAL: Alert, cooperative, no distress  LUNGS: Speaking in complete sentences, is on O2 via nasal cannula, appears comfortable  HEART: Normal rate, regular rhythm, and intact distal pulses  ABDOMEN: ABD pads bandaged over the central incision, not soaked no drainage around them, CINDI drain is in the right lower quadrant shows serosanguineous fluid, does have some faint bowel sounds present  EXTERMITY: No edema  24 HR INTAKE/OUTPUT :   Intake/Output Summary (Last 24 hours) at 6/25/2024 0941  Last data filed at 6/25/2024 0813  Gross per 24 hour   Intake 2273.35 ml   Output 2040 ml   Net 233.35 ml     DRAIN/TUBE OUTPUT :  ,    Current Facility-Administered Medications   Medication Dose Route Frequency Provider Last Rate Last Admin    metoprolol (LOPRESSOR) injection 2.5 mg  2.5 mg IntraVENous Q6H PRN Hannah Jean Baptiste L, APRN - NP        dextrose 5 % and 0.9 % NaCl with KCl 20 mEq infusion   IntraVENous Continuous Mallika Jean Baptisteita L, APRN - NP   Stopped at 06/24/24 0716    ondansetron (ZOFRAN) injection 4 mg  4 mg IntraVENous Q6H PRN Markel, Hannah L, APRN - NP        sodium chloride flush 0.9 % injection 10 mL  10 mL IntraVENous 2 times per day Markel, Hannah L, APRN - NP        sodium chloride flush 0.9 % injection 10 mL  10 mL IntraVENous PRN Markel Hannah L, APRN - NP        0.9 % sodium chloride infusion   IntraVENous PRN Markel,  06/25/2024 04:47 AM    BUN 10 06/25/2024 04:47 AM    CREATININE 1.0 06/25/2024 04:47 AM    MG 1.6 06/24/2024 04:10 AM       ASSESSMENT    ICD-10-CM    1. Malignant neoplasm of hepatic flexure (HCC)  C18.3 Surgical Pathology     Surgical Pathology         89-year-old female who was recently diagnosed with colon cancer was taken to the OR by Dr. Miranda on 6/24/2024 for an open left hemicolectomy, admitted to the floor, was initially placed on Levophed to assist her blood pressures, at this time is doing well with stable vitals, pain is as expected, CINDI drain showing serosanguineous fluid and her labs are slowly normalizing as expected, will need to continue with PT and OT evaluations and her respiratory therapy.  Will allow her to have broth coffee and tea which she has requested today and continue to slowly advance as able.  We do believe she had some existing infection whether this was diverticulitis or microperforations around the site of cancer we will continue IV antibiotics to cover this.    PLAN  1.  Continue cefoxitin  2.  Will add broth coffee and tea to her diet today  3.  Continue respiratory therapy PT and OT  4.  Hospitalist consult for medical management  5.  Would be okay with starting DVT prophylaxis tomorrow, bleeding is still significant enough today to wait till tomorrow morning.  6.  Continue IV pain control with Dilaudid  7.  Continue IV fluids  8.  Trend labs tomorrow.    Neri Paulino PA-C,   Electronically signed 6/25/2024 at 9:41 AM

## 2024-06-25 NOTE — CARE COORDINATION
DISCHARGE BARRIERS       Reason for Referral:  SW completed a Psychosocial Assessment for evaluation of patient's mental health, social status, and functional capacity within the community. Soumya Chaudhary is a 89 y.o. female admitted due to Colon cancer (HCC). Patient accompanied by children. SW provided supportive listening while patient discussed past medical history and events leading up to hospitalization.       Mental Status:  Alert, oriented, and engaging during assessment.     Decision Making:  Makes own decisions.     Family/Social/Home Environment: lives in an assisted living facility    Employment status: Unemployed    Health Insurance:     Active Insurance as of 6/23/2024       Primary Coverage       Payor Plan Insurance Group Employer/Plan Group    MEDICARE [place] MEDICARE        Payor Plan Address Payor Plan Phone Number Payor Plan Fax Number Effective Dates    [address] [address]   1/1/2015 - None Entered    [facility] [phone]         Subscriber Name Subscriber Birth Date Member ID       SOUMYA CHAUDHARY 3/14/1933 [id number]               Secondary Coverage       Payor Plan Insurance Group Employer/Plan Group    [place] MEDIC[place]        Payor Plan Address Payor Plan Phone Number Payor Plan Fax Number Effective Dates    [address]   11/1/2023 - None Entered    [facility] [phone]         Subscriber Name Subscriber Birth Date Member ID       SOUMYA CHAUDHARY 3/14/1933 [id number]                     Support: Discussed a good social support network     Current Services:  [facility]       Current DMEs: Walker     PCP: Jannet Wallace APRN - CNP and repots no issues affording medication.      status:   No     ADLs and means of transportation: Assistance with the following:, Bathing, Dressing, Mobility, Housework, Cooking, Shopping in ADLs prior to hospitalization and unable to transport self.    Food insecurity or needed financial assistance:

## 2024-06-25 NOTE — PLAN OF CARE
Problem: Discharge Planning  Goal: Discharge to home or other facility with appropriate resources  6/25/2024 1311 by Anika Chavira RN  Outcome: Progressing  6/25/2024 0426 by Kalli Monsalve RN  Outcome: Progressing  6/25/2024 0425 by Kalli Monsalve RN  Outcome: Progressing  Flowsheets (Taken 6/24/2024 2040)  Discharge to home or other facility with appropriate resources: Identify barriers to discharge with patient and caregiver     Problem: Pain  Goal: Verbalizes/displays adequate comfort level or baseline comfort level  6/25/2024 1311 by Anika Chavira RN  Outcome: Progressing  6/25/2024 0426 by Kalli Monsalve RN  Outcome: Progressing  6/25/2024 0425 by Kalli Monsalve RN  Outcome: Progressing     Problem: ABCDS Injury Assessment  Goal: Absence of physical injury  6/25/2024 1311 by Anika Chavira RN  Outcome: Progressing  6/25/2024 0426 by Kalli Monsalve RN  Outcome: Progressing  6/25/2024 0425 by Kalli Monsalve RN  Outcome: Progressing     Problem: Safety - Adult  Goal: Free from fall injury  6/25/2024 1311 by Anika Chavira RN  Outcome: Progressing  6/25/2024 0426 by Kalli Monsalve RN  Outcome: Progressing  6/25/2024 0425 by Kalli Monsalve RN  Outcome: Progressing     Problem: Gastrointestinal - Adult  Goal: Minimal or absence of nausea and vomiting  6/25/2024 1311 by Anika Chavira RN  Outcome: Progressing  6/25/2024 0426 by Kalli Monsalve RN  Outcome: Progressing  6/25/2024 0425 by Kalli Monsalve RN  Outcome: Progressing  Goal: Maintains or returns to baseline bowel function  6/25/2024 1311 by Anika Chavira RN  Outcome: Progressing  6/25/2024 0426 by Kalli Monsalve RN  Outcome: Progressing  6/25/2024 0425 by Kalli Monsalve RN  Outcome: Progressing     Problem: Skin/Tissue Integrity  Goal: Absence of new skin breakdown  Description: 1.  Monitor for areas of redness and/or skin breakdown  2.  Assess vascular access sites hourly  3.  Every 4-6 hours minimum:  Change oxygen  saturation probe site  4.  Every 4-6 hours:  If on nasal continuous positive airway pressure, respiratory therapy assess nares and determine need for appliance change or resting period.  6/25/2024 1311 by Anika Chavira RN  Outcome: Progressing  6/25/2024 0426 by Kalli Monsalve RN  Outcome: Progressing  6/25/2024 0425 by Kalli Monsalve RN  Outcome: Progressing     Problem: Chronic Conditions and Co-morbidities  Goal: Patient's chronic conditions and co-morbidity symptoms are monitored and maintained or improved  6/25/2024 1311 by Anika Chavira RN  Outcome: Progressing  6/25/2024 0426 by Kalli Monsalve RN  Outcome: Progressing  6/25/2024 0425 by Kalli Monsalve RN  Outcome: Progressing  Flowsheets (Taken 6/24/2024 2040)  Care Plan - Patient's Chronic Conditions and Co-Morbidity Symptoms are Monitored and Maintained or Improved: Monitor and assess patient's chronic conditions and comorbid symptoms for stability, deterioration, or improvement     Problem: Skin/Tissue Integrity - Adult  Goal: Skin integrity remains intact  6/25/2024 1311 by Anika Chavira RN  Outcome: Progressing  6/25/2024 0426 by Kalli Monsalve RN  Outcome: Progressing  Goal: Incisions, wounds, or drain sites healing without S/S of infection  6/25/2024 1311 by Anika Chavira RN  Outcome: Progressing  6/25/2024 0426 by Kalli Monsalve RN  Outcome: Progressing  Goal: Oral mucous membranes remain intact  6/25/2024 1311 by Anika Chavira RN  Outcome: Progressing  6/25/2024 0426 by Kalli Monsalve RN  Outcome: Progressing     Problem: Musculoskeletal - Adult  Goal: Return mobility to safest level of function  6/25/2024 1311 by Anika Chavira RN  Outcome: Progressing  6/25/2024 0426 by Kalli Monsalve RN  Outcome: Progressing  Goal: Maintain proper alignment of affected body part  6/25/2024 1311 by Anika Chavira RN  Outcome: Progressing  6/25/2024 0426 by Kalli Monsalve RN  Outcome: Progressing  Goal: Return ADL status to a safe level of

## 2024-06-25 NOTE — PLAN OF CARE
Problem: Pain  Goal: Verbalizes/displays adequate comfort level or baseline comfort level  Outcome: Progressing     Problem: ABCDS Injury Assessment  Goal: Absence of physical injury  Outcome: Progressing     Problem: Safety - Adult  Goal: Free from fall injury  Outcome: Progressing     Problem: Gastrointestinal - Adult  Goal: Minimal or absence of nausea and vomiting  Outcome: Progressing

## 2024-06-25 NOTE — CARE COORDINATION
Case Management Assessment  Initial Evaluation    Date/Time of Evaluation: 6/25/2024 11:21 AM  Assessment Completed by: Kari Wu RN    If patient is discharged prior to next notation, then this note serves as note for discharge by case management.    Patient Name: Soumya Smith                   YOB: 1933  Diagnosis: Malignant neoplasm of transverse colon [C18.4]  Colon cancer (HCC) [C18.9]  S/P right hemicolectomy [Z90.49]  Abdominal mass, unspecified abdominal location [R19.00]                   Date / Time: 6/23/2024  7:53 AM    Patient Admission Status: Inpatient   Readmission Risk (Low < 19, Mod (19-27), High > 27): Readmission Risk Score: 17.1    Current PCP: Jannet Wallace APRN - CNP  PCP verified by CM? Yes    Chart Reviewed: Yes      History Provided by: Child/Family  Patient Orientation: Alert and Oriented    Patient Cognition: Alert    Hospitalization in the last 30 days (Readmission):  No    If yes, Readmission Assessment in  Navigator will be completed.    Advance Directives:      Code Status: DNR-CC   Patient's Primary Decision Maker is:        Discharge Planning:    Patient lives with: Other (Comment) Type of Home: Assisted living  Primary Care Giver: Other (Comment) ([facility] assisted [facility])  Patient Support Systems include: Children, Family Members   Current Financial resources: Medicare  Current community resources: Assisted Living  Current services prior to admission: Durable Medical Equipment            Current DME: Walker            Type of Home Care services:  None    ADLS  Prior functional level: Assistance with the following:, Bathing, Dressing, Mobility, Housework, Cooking, Shopping  Current functional level: Assistance with the following:, Bathing, Dressing, Toileting, Cooking, Housework, Shopping, Mobility    PT AM-PAC:   /24  OT AM-PAC:   /24    Family can provide assistance at DC: No  Would you like Case Management to discuss the discharge plan with any

## 2024-06-25 NOTE — PROGRESS NOTES
Occupational Therapy  Facility/Department: [facility]  PROGRESSIVE CARE  Occupational Therapy Initial Assessment    Name: Soumya Smith  : 3/14/1933  MRN: [id number]  Date of Service: 2024    Discharge Recommendations:  Subacute/Skilled Nursing Facility, Long Term Care with OT, Patient would benefit from continued therapy after discharge     Patient Diagnosis(es): The encounter diagnosis was Malignant neoplasm of hepatic flexure (HCC).  Past Medical History:  has a past medical history of Atrial fibrillation (HCC), Cancer of ear, Depression, Diabetes (HCC), GERD (gastroesophageal reflux disease), High cholesterol, Hyperlipemia, Hypertension, Osteoarthritis, Skin cancer of eyelid, and Skin cancer of nose.  Past Surgical History:  has a past surgical history that includes Appendectomy (N/A); Hysterectomy (N/A); Cholecystectomy (N/A); transesophageal echocardiogram (N/A, 10/2018); Tonsillectomy and adenoidectomy (N/A); and hemicolectomy (N/A, 2024).    Treatment Diagnosis: general weakness      Assessment   Performance deficits / Impairments: Decreased functional mobility ;Decreased ADL status;Decreased endurance;Decreased balance;Decreased high-level IADLs;Decreased coordination;Decreased strength  Treatment Diagnosis: general weakness  Prognosis: Guarded  Decision Making: Medium Complexity           Plan   Occupational Therapy Plan  Times Per Week: 3-5  Therapy Duration: 4-7 Days  Current Treatment Recommendations: Patient/Caregiver education & training, Equipment evaluation, education, & procurement, Functional mobility training, Self-Care / ADL     Restrictions  Restrictions/Precautions  Restrictions/Precautions: Surgical Protocols, Fall Risk    Subjective   General  Chart Reviewed: Yes  Patient assessed for rehabilitation services?: Yes  Family / Caregiver Present: Yes  Referring Practitioner: SHANTHI Jean Baptiste  Diagnosis: colon cancer     Social/Functional History  Social/Functional History  Lives With: Other

## 2024-06-25 NOTE — PROGRESS NOTES
Physical Therapy  Facility/Department: [facility]  PROGRESSIVE CARE  Physical Therapy Initial Assessment    Name: Soumya Smith  : 3/14/1933  MRN: [id number]  Date of Service: 2024    Discharge Recommendations:  Subacute/Skilled Nursing Facility, Long Term Care with PT, Continue to assess pending progress          Patient Diagnosis(es): The encounter diagnosis was Malignant neoplasm of hepatic flexure (HCC).  Past Medical History:  has a past medical history of Atrial fibrillation (HCC), Cancer of ear, Depression, Diabetes (HCC), GERD (gastroesophageal reflux disease), High cholesterol, Hyperlipemia, Hypertension, Osteoarthritis, Skin cancer of eyelid, and Skin cancer of nose.  Past Surgical History:  has a past surgical history that includes Appendectomy (N/A); Hysterectomy (N/A); Cholecystectomy (N/A); transesophageal echocardiogram (N/A, 10/2018); Tonsillectomy and adenoidectomy (N/A); and hemicolectomy (N/A, 2024).    Assessment   Body Structures, Functions, Activity Limitations Requiring Skilled Therapeutic Intervention: Decreased functional mobility ;Decreased ADL status;Decreased ROM;Decreased balance;Decreased endurance;Decreased strength;Increased pain;Decreased posture  Therapy Prognosis: Fair  Decision Making: Medium Complexity  Activity Tolerance  Activity Tolerance: Patient limited by pain     Plan   Physical Therapy Plan  General Plan: 5-7 times per week  Current Treatment Recommendations: Strengthening, ROM, Balance training, Functional mobility training, Transfer training, Neuromuscular re-education, Gait training, Home exercise program, Safety education & training, Patient/Caregiver education & training, Return to work related activity, Equipment evaluation, education, & procurement  Safety Devices  Type of Devices: Bed alarm in place, Call light within reach, Left in bed, Patient at risk for falls     Restrictions  Restrictions/Precautions  Restrictions/Precautions: Surgical Protocols      Subjective   General  Chart Reviewed: Yes  Patient assessed for rehabilitation services?: Yes         Social/Functional History  Social/Functional History  Lives With: Home care staff  Type of Home: Assisted living  Home Layout: One level  Home Access: Level entry  Bathroom Shower/Tub: Walk-in shower  Bathroom Toilet: Handicap height  Bathroom Equipment: Grab bars in shower, Shower chair, Grab bars around toilet  Bathroom Accessibility: Accessible  Home Equipment: Rollator  Receives Help From: Personal care attendant  ADL Assistance: Needs assistance  Toileting: Needs assistance  Homemaking Assistance: Needs assistance  Homemaking Responsibilities: No  Ambulation Assistance: Needs assistance  Transfer Assistance: Needs assistance  Active : No  Vision/Hearing  Hearing  Hearing: Exceptions to WFL  Hearing Exceptions: Hard of hearing/hearing concerns    Cognition   Orientation  Orientation Level: Oriented to person     Objective   Temp: 99.1 °F (37.3 °C)  Pulse: 85  Heart Rate Source: Monitor  Respirations: 16  SpO2: 95 %  O2 Device: Nasal cannula  BP: (!) 100/57  MAP (Calculated): 71  BP Location: Left upper arm  BP Method: Automatic  Patient Position: High fowlers     Observation/Palpation  Posture: Fair        AROM RLE (degrees)  RLE AROM: WFL  AROM LLE (degrees)  LLE AROM : WFL  Strength RLE  Comment: grossly 3+-4-/5  Strength LLE  Comment: grossly 3+-4-/5           Bed mobility  Rolling to Left: Maximum assistance;2 Person assistance  Rolling to Right: Maximum assistance;2 Person assistance  Supine to Sit: 2 Person assistance;Maximum assistance  Sit to Supine: Moderate assistance;2 Person assistance  Scooting: Moderate assistance  Transfers  Sit to Stand: Maximum Assistance;2 Person Assistance  Stand to Sit: 2 Person Assistance;Maximum Assistance  Ambulation  Assistance: Unable to assess     Balance  Posture: Fair  Sitting - Static: Fair;-  Sitting - Dynamic: Poor         Goals  Short Term Goals  Time

## 2024-06-25 NOTE — PROGRESS NOTES
Ashley Andrew, PPatient Assessment complete. 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   Vitals:    06/25/24 1210   BP:    Pulse:    Resp: 14   Temp:    SpO2:    . Patients home meds are   Prior to Admission medications    Medication Sig Start Date End Date Taking? Authorizing Provider   magnesium oxide (MAG-OX) 400 (240 Mg) MG tablet  5/1/24   Lakeisha Vizcarra MD   ASPERCREME LIDOCAINE 4 % CREA  5/8/24   Lakeisha Vizcarra MD   LIDOCAINE PAIN RELIEF 4 % external patch  4/15/24   Lakeisha Vizcarra MD   acetaminophen (TYLENOL) 500 MG tablet Take 2 tablets by mouth every 6 hours as needed for Fever or Pain 12/8/23 12/7/24  Lakeisha Vizcarra MD   albuterol sulfate HFA (PROVENTIL;VENTOLIN;PROAIR) 108 (90 Base) MCG/ACT inhaler Inhale 2 puffs into the lungs every 4 hours as needed for Shortness of Breath or Wheezing 5/30/23   Lakeisha Vizcarra MD   albuterol (PROVENTIL) (2.5 MG/3ML) 0.083% nebulizer solution Inhale 3 mLs into the lungs every 4 hours as needed for Shortness of Breath or Wheezing 3/16/23   Lakeisha Vizcarra MD   amiodarone (CORDARONE) 200 MG tablet Take 1 tablet by mouth daily 11/23/22   Lakeisha Vizcarra MD   apixaban (ELIQUIS) 2.5 MG TABS tablet Take 2 tablets by mouth 2 times daily    Lakeisha Vizcarra MD   ascorbic acid (VITAMIN C) 250 MG tablet Take 1 tablet by mouth daily    Lakeisha Vizcarra MD   docusate (COLACE, DULCOLAX) 100 MG CAPS Take 100 mg by mouth daily    Lakeisha Vizcarra MD   fluticasone-umeclidin-vilant (TRELEGY ELLIPTA) 100-62.5-25 MCG/ACT AEPB inhaler Inhale 1 puff into the lungs daily    Lakeisha Vizcarra MD   furosemide (LASIX) 40 MG tablet Take 1 tablet by mouth daily 12/8/23   Lakeisha Vizcarra MD   pantoprazole (PROTONIX) 40 MG tablet Take 1 tablet by mouth daily 4/3/23   Lakeisha Vizcarra MD

## 2024-06-26 LAB
ANION GAP SERPL CALCULATED.3IONS-SCNC: 9 MMOL/L (ref 9–17)
BASOPHILS # BLD: 0.03 K/UL (ref 0–0.2)
BASOPHILS NFR BLD: 0 % (ref 0–2)
BUN SERPL-MCNC: 13 MG/DL (ref 8–23)
BUN/CREAT SERPL: 13 (ref 9–20)
CALCIUM SERPL-MCNC: 8 MG/DL (ref 8.6–10.4)
CHLORIDE SERPL-SCNC: 107 MMOL/L (ref 98–107)
CO2 SERPL-SCNC: 19 MMOL/L (ref 20–31)
CREAT SERPL-MCNC: 1 MG/DL (ref 0.5–0.9)
EOSINOPHIL # BLD: 0.08 K/UL (ref 0–0.44)
EOSINOPHILS RELATIVE PERCENT: 1 % (ref 1–4)
ERYTHROCYTE [DISTWIDTH] IN BLOOD BY AUTOMATED COUNT: 15.1 % (ref 11.8–14.4)
GFR, ESTIMATED: 53 ML/MIN/1.73M2
GLUCOSE SERPL-MCNC: 123 MG/DL (ref 70–99)
HCT VFR BLD AUTO: 27.5 % (ref 36.3–47.1)
HGB BLD-MCNC: 8.8 G/DL (ref 11.9–15.1)
IMM GRANULOCYTES # BLD AUTO: 0.13 K/UL (ref 0–0.3)
IMM GRANULOCYTES NFR BLD: 1 %
LYMPHOCYTES NFR BLD: 1.62 K/UL (ref 1.1–3.7)
LYMPHOCYTES RELATIVE PERCENT: 10 % (ref 24–43)
MAGNESIUM SERPL-MCNC: 1.7 MG/DL (ref 1.6–2.6)
MCH RBC QN AUTO: 27.6 PG (ref 25.2–33.5)
MCHC RBC AUTO-ENTMCNC: 32 G/DL (ref 25.2–33.5)
MCV RBC AUTO: 86.2 FL (ref 82.6–102.9)
MONOCYTES NFR BLD: 0.74 K/UL (ref 0.1–1.2)
MONOCYTES NFR BLD: 4 % (ref 3–12)
NEUTROPHILS NFR BLD: 84 % (ref 36–65)
NEUTS SEG NFR BLD: 14.33 K/UL (ref 1.5–8.1)
NRBC BLD-RTO: 0 PER 100 WBC
PLATELET # BLD AUTO: 335 K/UL (ref 138–453)
PMV BLD AUTO: 9.4 FL (ref 8.1–13.5)
POTASSIUM SERPL-SCNC: 3.7 MMOL/L (ref 3.7–5.3)
RBC # BLD AUTO: 3.19 M/UL (ref 3.95–5.11)
RBC # BLD: ABNORMAL 10*6/UL
SODIUM SERPL-SCNC: 135 MMOL/L (ref 135–144)
WBC OTHER # BLD: 16.9 K/UL (ref 3.5–11.3)

## 2024-06-26 PROCEDURE — 6370000000 HC RX 637 (ALT 250 FOR IP)

## 2024-06-26 PROCEDURE — 2580000003 HC RX 258

## 2024-06-26 PROCEDURE — 94640 AIRWAY INHALATION TREATMENT: CPT

## 2024-06-26 PROCEDURE — 36415 COLL VENOUS BLD VENIPUNCTURE: CPT

## 2024-06-26 PROCEDURE — 97110 THERAPEUTIC EXERCISES: CPT

## 2024-06-26 PROCEDURE — 6360000002 HC RX W HCPCS

## 2024-06-26 PROCEDURE — 99231 SBSQ HOSP IP/OBS SF/LOW 25: CPT | Performed by: INTERNAL MEDICINE

## 2024-06-26 PROCEDURE — 80048 BASIC METABOLIC PNL TOTAL CA: CPT

## 2024-06-26 PROCEDURE — 85025 COMPLETE CBC W/AUTO DIFF WBC: CPT

## 2024-06-26 PROCEDURE — 2700000000 HC OXYGEN THERAPY PER DAY

## 2024-06-26 PROCEDURE — 6360000002 HC RX W HCPCS: Performed by: INTERNAL MEDICINE

## 2024-06-26 PROCEDURE — 2060000000 HC ICU INTERMEDIATE R&B

## 2024-06-26 PROCEDURE — 94761 N-INVAS EAR/PLS OXIMETRY MLT: CPT

## 2024-06-26 PROCEDURE — 83735 ASSAY OF MAGNESIUM: CPT

## 2024-06-26 PROCEDURE — 97530 THERAPEUTIC ACTIVITIES: CPT

## 2024-06-26 PROCEDURE — 99024 POSTOP FOLLOW-UP VISIT: CPT | Performed by: PHYSICIAN ASSISTANT

## 2024-06-26 RX ORDER — FUROSEMIDE 10 MG/ML
20 INJECTION INTRAMUSCULAR; INTRAVENOUS ONCE
Status: COMPLETED | OUTPATIENT
Start: 2024-06-26 | End: 2024-06-26

## 2024-06-26 RX ADMIN — MOMETASONE FUROATE AND FORMOTEROL FUMARATE DIHYDRATE 2 PUFF: 200; 5 AEROSOL RESPIRATORY (INHALATION) at 07:50

## 2024-06-26 RX ADMIN — MICONAZOLE NITRATE: 20 POWDER TOPICAL at 19:34

## 2024-06-26 RX ADMIN — IPRATROPIUM BROMIDE AND ALBUTEROL SULFATE 1 DOSE: 2.5; .5 SOLUTION RESPIRATORY (INHALATION) at 11:17

## 2024-06-26 RX ADMIN — MIDODRINE HYDROCHLORIDE 10 MG: 5 TABLET ORAL at 13:04

## 2024-06-26 RX ADMIN — OXYCODONE HYDROCHLORIDE 5 MG: 5 TABLET ORAL at 19:32

## 2024-06-26 RX ADMIN — CEFOXITIN SODIUM 2000 MG: 2 POWDER, FOR SOLUTION INTRAVENOUS at 21:08

## 2024-06-26 RX ADMIN — DOCUSATE SODIUM 100 MG: 100 CAPSULE, LIQUID FILLED ORAL at 08:57

## 2024-06-26 RX ADMIN — CEFOXITIN SODIUM 2000 MG: 2 POWDER, FOR SOLUTION INTRAVENOUS at 06:32

## 2024-06-26 RX ADMIN — MOMETASONE FUROATE AND FORMOTEROL FUMARATE DIHYDRATE 2 PUFF: 200; 5 AEROSOL RESPIRATORY (INHALATION) at 20:09

## 2024-06-26 RX ADMIN — DEXTROSE AND SODIUM CHLORIDE: 5; 450 INJECTION, SOLUTION INTRAVENOUS at 14:05

## 2024-06-26 RX ADMIN — SUCRALFATE 1 G: 1 TABLET ORAL at 13:04

## 2024-06-26 RX ADMIN — FUROSEMIDE 20 MG: 10 INJECTION, SOLUTION INTRAMUSCULAR; INTRAVENOUS at 10:52

## 2024-06-26 RX ADMIN — AMIODARONE HYDROCHLORIDE 200 MG: 200 TABLET ORAL at 08:56

## 2024-06-26 RX ADMIN — IPRATROPIUM BROMIDE AND ALBUTEROL SULFATE 1 DOSE: 2.5; .5 SOLUTION RESPIRATORY (INHALATION) at 07:50

## 2024-06-26 RX ADMIN — ATORVASTATIN CALCIUM 40 MG: 40 TABLET, FILM COATED ORAL at 08:57

## 2024-06-26 RX ADMIN — MIDODRINE HYDROCHLORIDE 10 MG: 5 TABLET ORAL at 16:57

## 2024-06-26 RX ADMIN — IPRATROPIUM BROMIDE AND ALBUTEROL SULFATE 1 DOSE: 2.5; .5 SOLUTION RESPIRATORY (INHALATION) at 16:40

## 2024-06-26 RX ADMIN — CEFOXITIN SODIUM 2000 MG: 2 POWDER, FOR SOLUTION INTRAVENOUS at 13:04

## 2024-06-26 RX ADMIN — SUCRALFATE 1 G: 1 TABLET ORAL at 06:33

## 2024-06-26 RX ADMIN — IPRATROPIUM BROMIDE AND ALBUTEROL SULFATE 1 DOSE: 2.5; .5 SOLUTION RESPIRATORY (INHALATION) at 20:09

## 2024-06-26 RX ADMIN — OXYCODONE HYDROCHLORIDE 10 MG: 5 TABLET ORAL at 03:59

## 2024-06-26 RX ADMIN — SODIUM CHLORIDE, PRESERVATIVE FREE 10 ML: 5 INJECTION INTRAVENOUS at 19:33

## 2024-06-26 RX ADMIN — SUCRALFATE 1 G: 1 TABLET ORAL at 16:57

## 2024-06-26 RX ADMIN — PANTOPRAZOLE SODIUM 40 MG: 40 TABLET, DELAYED RELEASE ORAL at 08:57

## 2024-06-26 RX ADMIN — SODIUM CHLORIDE, PRESERVATIVE FREE 10 ML: 5 INJECTION INTRAVENOUS at 08:57

## 2024-06-26 RX ADMIN — FLUCONAZOLE 200 MG: 2 INJECTION, SOLUTION INTRAVENOUS at 16:04

## 2024-06-26 RX ADMIN — SERTRALINE 100 MG: 50 TABLET, FILM COATED ORAL at 08:57

## 2024-06-26 RX ADMIN — MICONAZOLE NITRATE: 20 POWDER TOPICAL at 08:56

## 2024-06-26 RX ADMIN — MIDODRINE HYDROCHLORIDE 10 MG: 5 TABLET ORAL at 08:57

## 2024-06-26 RX ADMIN — DEXTROSE AND SODIUM CHLORIDE: 5; 450 INJECTION, SOLUTION INTRAVENOUS at 04:01

## 2024-06-26 ASSESSMENT — PAIN DESCRIPTION - LOCATION
LOCATION: ABDOMEN

## 2024-06-26 ASSESSMENT — PAIN - FUNCTIONAL ASSESSMENT
PAIN_FUNCTIONAL_ASSESSMENT: PREVENTS OR INTERFERES SOME ACTIVE ACTIVITIES AND ADLS
PAIN_FUNCTIONAL_ASSESSMENT: ACTIVITIES ARE NOT PREVENTED

## 2024-06-26 ASSESSMENT — PAIN DESCRIPTION - DESCRIPTORS
DESCRIPTORS: ACHING;SHARP
DESCRIPTORS: ACHING;SHARP
DESCRIPTORS: ACHING

## 2024-06-26 ASSESSMENT — PAIN SCALES - GENERAL
PAINLEVEL_OUTOF10: 4
PAINLEVEL_OUTOF10: 7
PAINLEVEL_OUTOF10: 6
PAINLEVEL_OUTOF10: 4

## 2024-06-26 ASSESSMENT — PAIN DESCRIPTION - ORIENTATION
ORIENTATION: MID
ORIENTATION: MID

## 2024-06-26 NOTE — PROGRESS NOTES
Hospitalist Progress Note    Patient:  Soumya Smith     YOB: 1933    MRN: [id number]   Admit date: 6/23/2024     Acct: [id number]     PCP: Jannet Wallace APRN - CNP    CC--Interval History: POD 1 open right hemicolectomy for adenocarcinoma---6.24.2024---more alert and animated today.      On Mefoxin    CINDI 970 and 100 mL out-----serosanguinous    Yeast dermatitis--Diflucan---mycostatin    CKD---Stage 2 --> 3a    See note below    All other ROS negative except noted in HPI    Diet:  Diet NPO Exceptions are: Sips of Water with Meds, Ice Chips, Popsicles, Other (Specify); Specify Other Exceptions: Is allowed broth, coffee and tea    Medications:  Scheduled Meds:   sodium chloride flush  10 mL IntraVENous 2 times per day    cefOXitin  2,000 mg IntraVENous Q8H    fluconazole  200 mg IntraVENous Q24H    midodrine  10 mg Oral TID WC    miconazole   Topical BID    ipratropium 0.5 mg-albuterol 2.5 mg  1 Dose Inhalation Q4H WA RT    amiodarone  200 mg Oral Daily    atorvastatin  40 mg Oral Daily    docusate sodium  100 mg Oral Daily    pantoprazole  40 mg Oral Daily    sertraline  100 mg Oral Daily    sucralfate  1 g Oral TID AC    mometasone-formoterol  2 puff Inhalation BID RT    tiotropium  2 puff Inhalation Daily RT     Continuous Infusions:   sodium chloride Stopped (06/25/24 0159)    dextrose 5 % and 0.45 % NaCl 75 mL/hr at 06/25/24 1826    norepinephrine Stopped (06/24/24 1824)    sodium bicarbonate 150 mEq in dextrose 5 % 1,000 mL infusion 50 mL/hr at 06/25/24 1835     PRN Meds:metoprolol, ondansetron, sodium chloride flush, sodium chloride, potassium chloride **OR** potassium alternative oral replacement **OR** potassium chloride, magnesium sulfate, ondansetron **OR** ondansetron, oxyCODONE **OR** oxyCODONE, HYDROmorphone, sodium chloride nebulizer, albuterol, acetaminophen    Objective:  Labs:  CBC with Differential:    Lab Results   Component Value Date/Time    WBC 15.9 06/25/2024 04:47 AM    RBC  problems. *    KRYSTAL CHAUDHARY.  89  WF  [Jannet Wallace NP;  manju Miranda GS;  [facility]]  DNR-CC   AMIODARONE   LEVOPHED gtt---6.24.2024--dc'd  midodrine   SUPPLEMENTAL OXYGEN--2 liters  Transfer form [facility]    Anti-infectives:    Mefoxin IV,  Flagyl IV,  Diflucan IV. Mycostatin topical     POD _____ open right hemicolectomy and sigmoidoscopy---6.24.2024---Miranda  Adenocarcinoma hepatic flexure--partially obstructing  RUQ pain  Hypotension  Metabolic acidosis             VBG---6.24.2024---7.22----42--44--70.6---17.4          EKG---6.24.2024---atrial fibrillation--72--RBBB--LAHB--septal infarction           CT AP---6.20.2024---see below---sigmoid diverticulitis---increased pelvic wall fluid          CT AP---6.17.2024---mild sigmoid diverticulitis--hypodensity right lobe liver             Colonoscopy---6.7.2024---mass @ 100 cm--transverse colon           EGD----6.7.2024--polyp with polypectomy    Diverticulitis--sigmoid---acute--6.17.2024  Atelectasis     Atrial fibrillation         MICHAEL---11.3.2023----LAE--lipomatous hypertrophy IAS----mildly restricted AV cusp---mild reduced LVSF---                             no thrombus--calcified AV--mild to moderate AS--trace MR--mild-to-moderate TR---                             LVEF ~ 45-50%         2D ECHO---11.21.2022---LA mildly dilated-----RA mildly dilated--normal atrial septum---                            asymmetric LVH---NLVSF--NRVSF----mild calcification AV---no AS---MAC---                            trace MR-TR---RVSP ~ 20 mm Hg----mild dilatation ascending aorta--4.0 cm--                            AR 3.7 cm--diastolic function uncertain due to atrial fibrillation--LVEF ~ 60-65%         MICHAEL---cardioversion--2018  AS---aortic stenosis--see above                   III/VI CELIO  ASCVD  HTN  Hyperlipidemia  Diabetes Mellitus Type 2  CKD---Stage 3b  COPD  GERD  Overweight   Depression   Tobacco use---onset 7.15.1972--quit---7.15.2012    PMH:

## 2024-06-26 NOTE — PROGRESS NOTES
Physician Progress Note      PATIENT:               KRYSTAL CHAUDHARY  CSN #:                  [id number]  :                       3/14/1933  ADMIT DATE:       2024 7:53 AM  DISCH DATE:  RESPONDING  PROVIDER #:        Lamont Disla MD          QUERY TEXT:    Pt admitted with colon cancer and underwent right hemicolectomy.  On , pt   noted to have \"hypotension\" (BP 73/41) requiring Levophed and midodrine.  If   possible, please document in the progress notes and discharge summary if you   are evaluating and/or treating any of the following:    The medical record reflects the following:  Risk Factors: colon cancer s/p right hemicolectomy  Clinical Indicators: On , pt noted to have \"hypotension\" (BP 73/41)   requiring Levophed and midodrine.  Treatment: Levophed, midodrine, IVF, VS monitoring, I&Os    Thank you!    Jannet Granados, RN, BSN, RHIT, CCDS  Clinical   Options provided:  -- Cardiogenic Shock  -- Hemorrhagic Shock  -- Neurogenic Shock  -- Traumatic Shock  -- Obstructive Shock  -- Septic Shock  -- Hypovolemic Shock  -- Hypovolemia without Shock  -- Hypotension without Shock  -- Other - I will add my own diagnosis  -- Disagree - Not applicable / Not valid  -- Disagree - Clinically unable to determine / Unknown  -- Refer to Clinical Documentation Reviewer    PROVIDER RESPONSE TEXT:    This patient has hypovolemia without shock.    Query created by: Jannet Granados on 2024 3:08 PM      Electronically signed by:  Lamont Disla MD 2024 7:09 AM

## 2024-06-26 NOTE — FLOWSHEET NOTE
Physical Therapy     Pt in bed and sleeping upon arrival. Family members present in room and states patient has been sleeping for over an hour. Attempted for two minutes to wake patient, however unable. Plan to stop back and attempt therapy this afternoon.     Cristy Mancuso, PTA

## 2024-06-26 NOTE — PROGRESS NOTES
Hospitalist Progress Note    Patient:  Soumya Smith     YOB: 1933    MRN: [id number]   Admit date: 6/23/2024     Acct: [id number]     PCP: Jannet Wallace APRN - CNP    CC--Interval History: POD 3 open right hemicolectomy---6.24.2024 for adenocarcinoma colon--hepatic flexure    CINDI 560 + 40--serosanguinous output----soler 225    WBC--15.9 --> 16.9 but afebrile    Fluid shifting ---> Lasix 20 mg IV once today    CKD--Stage 3b---stable    See note below     All other ROS negative except noted in HPI    Diet:  Diet NPO Exceptions are: Sips of Water with Meds, Ice Chips, Popsicles, Other (Specify); Specify Other Exceptions: Is allowed broth, coffee and tea    Medications:  Scheduled Meds:   sodium chloride flush  10 mL IntraVENous 2 times per day    cefOXitin  2,000 mg IntraVENous Q8H    fluconazole  200 mg IntraVENous Q24H    midodrine  10 mg Oral TID WC    miconazole   Topical BID    ipratropium 0.5 mg-albuterol 2.5 mg  1 Dose Inhalation Q4H WA RT    amiodarone  200 mg Oral Daily    atorvastatin  40 mg Oral Daily    docusate sodium  100 mg Oral Daily    pantoprazole  40 mg Oral Daily    sertraline  100 mg Oral Daily    sucralfate  1 g Oral TID AC    mometasone-formoterol  2 puff Inhalation BID RT    tiotropium  2 puff Inhalation Daily RT     Continuous Infusions:   sodium chloride Stopped (06/25/24 0159)    dextrose 5 % and 0.45 % NaCl 125 mL/hr at 06/26/24 0846     PRN Meds:metoprolol, sodium chloride flush, sodium chloride, potassium chloride **OR** potassium alternative oral replacement **OR** potassium chloride, magnesium sulfate, ondansetron **OR** ondansetron, oxyCODONE **OR** oxyCODONE, HYDROmorphone, sodium chloride nebulizer, albuterol, acetaminophen    Objective:  Labs:  CBC with Differential:    Lab Results   Component Value Date/Time    WBC 16.9 06/26/2024 05:56 AM    RBC 3.19 06/26/2024 05:56 AM    HGB 8.8 06/26/2024 05:56 AM    HCT 27.5 06/26/2024 05:56 AM     06/26/2024 05:56 AM  abdominal location  Resolved Problems:    * No resolved hospital problems. *    KRYSTAL CHAUDHARY  C.  89  WF  [Jannet Wallace NP;  CAITIE De;  [facility]]  DNR-CC   AMIODARONE   LEVOPHED gtt---6.24.2024--dc'd  midodrine   SUPPLEMENTAL OXYGEN--2 liters  Transfer [facility]    Anti-infectives:    Mefoxin IV,  Flagyl IV,  Diflucan IV. Mycostatin topical     POD _____ open right hemicolectomy and sigmoidoscopy---6.24.2024---Miranda  Adenocarcinoma hepatic flexure--partially obstructing  RUQ pain  Hypotension  Metabolic acidosis             VBG---6.24.2024---7.22----42--44--70.6---17.4          EKG---6.24.2024---atrial fibrillation--72--RBBB--LAHB--septal infarction           CT AP---6.20.2024---see below---sigmoid diverticulitis---increased pelvic wall fluid          CT AP---6.17.2024---mild sigmoid diverticulitis--hypodensity right lobe liver             Colonoscopy---6.7.2024---mass @ 100 cm--transverse colon           EGD----6.7.2024--polyp with polypectomy    Diverticulitis--sigmoid---acute--6.17.2024  Atelectasis     Atrial fibrillation         MICHAEL---11.3.2023----LAE--lipomatous hypertrophy IAS----mildly restricted AV cusp---mild reduced LVSF---                             no thrombus--calcified AV--mild to moderate AS--trace MR--mild-to-moderate TR---                             LVEF ~ 45-50%         2D ECHO---11.21.2022---LA mildly dilated-----RA mildly dilated--normal atrial septum---                            asymmetric LVH---NLVSF--NRVSF----mild calcification AV---no AS---MAC---                            trace MR-TR---RVSP ~ 20 mm Hg----mild dilatation ascending aorta--4.0 cm--                            AR 3.7 cm--diastolic function uncertain due to atrial fibrillation--LVEF ~ 60-65%         MICHAEL---cardioversion--2018  AS---aortic stenosis--see above                   III/VI CELIO  ASCVD  HTN  Hyperlipidemia  Diabetes Mellitus Type 2  CKD---Stage 3b  COPD  GERD  Overweight

## 2024-06-26 NOTE — PLAN OF CARE
Problem: Discharge Planning  Goal: Discharge to home or other facility with appropriate resources  Outcome: Progressing  Flowsheets (Taken 6/26/2024 0900)  Discharge to home or other facility with appropriate resources:   Identify barriers to discharge with patient and caregiver   Arrange for needed discharge resources and transportation as appropriate   Identify discharge learning needs (meds, wound care, etc)   Refer to discharge planning if patient needs post-hospital services based on physician order or complex needs related to functional status, cognitive ability or social support system  Note: Plan for ECF at discharge but unsure of which facility at this time     Problem: Pain  Goal: Verbalizes/displays adequate comfort level or baseline comfort level  Outcome: Progressing  Flowsheets (Taken 6/26/2024 1741)  Verbalizes/displays adequate comfort level or baseline comfort level:   Encourage patient to monitor pain and request assistance   Assess pain using appropriate pain scale   Administer analgesics based on type and severity of pain and evaluate response   Implement non-pharmacological measures as appropriate and evaluate response  Note: No c/o pain at this time     Problem: ABCDS Injury Assessment  Goal: Absence of physical injury  Outcome: Progressing  Flowsheets (Taken 6/26/2024 1741)  Absence of Physical Injury: Implement safety measures based on patient assessment  Note: Safety maintained; remains free from falls at this time     Problem: Safety - Adult  Goal: Free from fall injury  Outcome: Progressing  Flowsheets (Taken 6/26/2024 1741)  Free From Fall Injury: Instruct family/caregiver on patient safety  Note: Safety maintained; remains free from falls at this time     Problem: Gastrointestinal - Adult  Goal: Minimal or absence of nausea and vomiting  Outcome: Progressing  Flowsheets (Taken 6/26/2024 0900)  Minimal or absence of nausea and vomiting:   Administer IV fluids as ordered to ensure  catheterization for long term situations as appropriate  Goal: Urinary catheter remains patent  Outcome: Progressing  Flowsheets (Taken 6/26/2024 0900)  Urinary catheter remains patent:   Assess patency of urinary catheter   Irrigate catheter per Licensed Independent Practitioner order if indicated and notify Licensed Independent Practitioner if unable to irrigate     Problem: Infection - Adult  Goal: Absence of infection at discharge  Outcome: Progressing  Flowsheets (Taken 6/26/2024 0900)  Absence of infection at discharge:   Assess and monitor for signs and symptoms of infection   Monitor lab/diagnostic results   Monitor all insertion sites i.e., indwelling lines, tubes and drains   [place] appropriate cooling/warming therapies per order   Administer medications as ordered   Instruct and encourage patient and family to use good hand hygiene technique   Identify and instruct in appropriate isolation precautions for identified infection/condition  Goal: Absence of infection during hospitalization  Outcome: Progressing  Flowsheets (Taken 6/26/2024 0900)  Absence of infection during hospitalization:   Assess and monitor for signs and symptoms of infection   Monitor lab/diagnostic results   Monitor all insertion sites i.e., indwelling lines, tubes and drains   [place] appropriate cooling/warming therapies per order   Administer medications as ordered   Instruct and encourage patient and family to use good hand hygiene technique   Identify and instruct in appropriate isolation precautions for identified infection/condition  Goal: Absence of fever/infection during anticipated neutropenic period  Outcome: Progressing  Flowsheets (Taken 6/26/2024 0900)  Absence of fever/infection during anticipated neutropenic period: Monitor white blood cell count     Problem: Hematologic - Adult  Goal: Maintains hematologic stability  Outcome: Progressing  Flowsheets (Taken 6/26/2024 0900)  Maintains hematologic stability:   Assess

## 2024-06-26 NOTE — PROGRESS NOTES
Occupational Therapy  Facility/Department: [facility]  PROGRESSIVE CARE  Daily Treatment Note  NAME: Soumya Smith  : 3/14/1933  MRN: [id number]    Date of Service: 2024    Discharge Recommendations:  Subacute/Skilled Nursing Facility, Long Term Care with OT, Patient would benefit from continued therapy after discharge         Patient Diagnosis(es): The encounter diagnosis was Malignant neoplasm of hepatic flexure (HCC).     Assessment    Activity Tolerance: Patient limited by pain;Patient limited by fatigue;Patient limited by endurance  Discharge Recommendations: Subacute/Skilled Nursing Facility;Long Term Care with OT;Patient would benefit from continued therapy after discharge      Plan   Occupational Therapy Plan  Times Per Week: 3-5  Current Treatment Recommendations: Patient/Caregiver education & training;Equipment evaluation, education, & procurement;Functional mobility training;Self-Care / ADL     Restrictions       Subjective   Subjective  Subjective: Patient lying supine in bed, agreeable for OT session.  Orientation  Overall Orientation Status: Within Functional Limits  Pain: Denies  Cognition  Overall Cognitive Status: WFL        Objective    Vitals  Vitals  O2 Device: Nasal cannula (2L O2)  Bed Mobility Training  Bed Mobility Training: Yes  Overall Level of Assistance: Maximum assistance;Assist X1  Supine to Sit: Maximum assistance;Assist X1  Sit to Supine: Maximum assistance;Assist X1  Balance  Sitting: Impaired  Sitting - Static: Poor (constant support)  Transfer Training  Transfer Training: No  Gait  Gait Training: No     ADL  Grooming: Setup;Contact guard assistance (wash face with wet cloth; declined other grooming tasks at this time)        Safety Devices  Type of Devices: Left in bed;Bed alarm in place;Call light within reach;Nurse notified          Goals  Short Term Goals  Time Frame for Short Term Goals: duration of hospital stay  Short Term Goal 1: Patient to complete BSC(functional)

## 2024-06-26 NOTE — PROGRESS NOTES
General Surgery - Neri Paulino PA-C  Daily Progress Note  Pt Name: Soumya Smith  Medical Record Number: [id number]  Date of Birth 3/14/1933   Today's Date: 6/26/2024      SUBJECTIVE  Postop day 2 open right hemicolectomy  Wakes up easily for me this morning, states pain is a little better, pressures are stable,  no fevers  She denies any nausea, abdominal pain mostly over the incision  CINDI drain shows serosanguineous fluid, is getting a lot out of      OBJECTIVE  CURRENT VITALS BP (!) 101/49   Pulse 93   Temp 98.1 °F (36.7 °C) (Temporal)   Resp 16   Ht 1.626 m (5' 4\")   Wt 94.4 kg (208 lb 3.2 oz)   SpO2 91%   BMI 35.74 kg/m²   GENERAL: Alert, cooperative, no distress  LUNGS: Speaking in complete sentences, is on O2 via nasal cannula, appears comfortable  HEART: Normal rate, regular rhythm, and intact distal pulses  ABDOMEN: ABD pads bandaged over the central incision, not soaked no drainage around them, CINDI drain is in the right lower quadrant shows serosanguineous fluid and is about half full,  bowel sounds are present throughout  EXTERMITY: No edema  24 HR INTAKE/OUTPUT :   Intake/Output Summary (Last 24 hours) at 6/26/2024 1235  Last data filed at 6/26/2024 1058  Gross per 24 hour   Intake 3372.71 ml   Output 1480 ml   Net 1892.71 ml     DRAIN/TUBE OUTPUT :  ,    Current Facility-Administered Medications   Medication Dose Route Frequency Provider Last Rate Last Admin    metoprolol (LOPRESSOR) injection 2.5 mg  2.5 mg IntraVENous Q6H PRN Markel, Hannah L, APRN - NP        sodium chloride flush 0.9 % injection 10 mL  10 mL IntraVENous 2 times per day Markel, Hannah L, APRN - NP   10 mL at 06/26/24 0857    sodium chloride flush 0.9 % injection 10 mL  10 mL IntraVENous PRN Markel Hannah L, APRN - NP        0.9 % sodium chloride infusion   IntraVENous PRN Markel, Hannah L, APRN - NP   Stopped at 06/25/24 0159    potassium chloride (KLOR-CON M) extended release tablet 40 mEq  40 mEq Oral PRN Markel, Hannah L, APRN - NP

## 2024-06-27 ENCOUNTER — APPOINTMENT (OUTPATIENT)
Dept: GENERAL RADIOLOGY | Age: 89
End: 2024-06-27
Attending: FAMILY MEDICINE
Payer: MEDICARE

## 2024-06-27 LAB
ANION GAP SERPL CALCULATED.3IONS-SCNC: 10 MMOL/L (ref 9–17)
BACTERIA URNS QL MICRO: ABNORMAL
BACTERIA URNS QL MICRO: ABNORMAL
BASOPHILS # BLD: 0.03 K/UL (ref 0–0.2)
BASOPHILS NFR BLD: 0 % (ref 0–2)
BILIRUB UR QL STRIP: NEGATIVE
BILIRUB UR QL STRIP: NEGATIVE
BUN SERPL-MCNC: 15 MG/DL (ref 8–23)
BUN/CREAT SERPL: 15 (ref 9–20)
CALCIUM SERPL-MCNC: 7.7 MG/DL (ref 8.6–10.4)
CASTS #/AREA URNS LPF: ABNORMAL /LPF (ref 0–2)
CHARACTER UR: ABNORMAL
CHLORIDE SERPL-SCNC: 107 MMOL/L (ref 98–107)
CLARITY UR: ABNORMAL
CLARITY UR: ABNORMAL
CO2 SERPL-SCNC: 17 MMOL/L (ref 20–31)
COLOR UR: YELLOW
COLOR UR: YELLOW
CREAT SERPL-MCNC: 1 MG/DL (ref 0.5–0.9)
CREATININE FLUID: 1.2 MG/DL
EOSINOPHIL # BLD: 0.16 K/UL (ref 0–0.44)
EOSINOPHILS RELATIVE PERCENT: 1 % (ref 1–4)
EPI CELLS #/AREA URNS HPF: ABNORMAL /HPF (ref 0–5)
EPI CELLS #/AREA URNS HPF: ABNORMAL /HPF (ref 0–5)
ERYTHROCYTE [DISTWIDTH] IN BLOOD BY AUTOMATED COUNT: 15.5 % (ref 11.8–14.4)
GFR, ESTIMATED: 53 ML/MIN/1.73M2
GLUCOSE SERPL-MCNC: 114 MG/DL (ref 70–99)
GLUCOSE UR STRIP-MCNC: ABNORMAL MG/DL
GLUCOSE UR STRIP-MCNC: NEGATIVE MG/DL
HCT VFR BLD AUTO: 30.1 % (ref 36.3–47.1)
HGB BLD-MCNC: 9.4 G/DL (ref 11.9–15.1)
HGB UR QL STRIP.AUTO: ABNORMAL
HGB UR QL STRIP.AUTO: ABNORMAL
IMM GRANULOCYTES # BLD AUTO: 0.16 K/UL (ref 0–0.3)
IMM GRANULOCYTES NFR BLD: 1 %
KETONES UR STRIP-MCNC: ABNORMAL MG/DL
KETONES UR STRIP-MCNC: NEGATIVE MG/DL
LEUKOCYTE ESTERASE UR QL STRIP: ABNORMAL
LEUKOCYTE ESTERASE UR QL STRIP: ABNORMAL
LYMPHOCYTES NFR BLD: 1.7 K/UL (ref 1.1–3.7)
LYMPHOCYTES RELATIVE PERCENT: 9 % (ref 24–43)
MAGNESIUM SERPL-MCNC: 1.6 MG/DL (ref 1.6–2.6)
MCH RBC QN AUTO: 27.9 PG (ref 25.2–33.5)
MCHC RBC AUTO-ENTMCNC: 31.2 G/DL (ref 25.2–33.5)
MCV RBC AUTO: 89.3 FL (ref 82.6–102.9)
MONOCYTES NFR BLD: 0.71 K/UL (ref 0.1–1.2)
MONOCYTES NFR BLD: 4 % (ref 3–12)
NEUTROPHILS NFR BLD: 85 % (ref 36–65)
NEUTS SEG NFR BLD: 15.34 K/UL (ref 1.5–8.1)
NITRITE UR QL STRIP: NEGATIVE
NITRITE UR QL STRIP: POSITIVE
NRBC BLD-RTO: 0 PER 100 WBC
PH UR STRIP: 6 [PH] (ref 5–6)
PH UR STRIP: 7.5 [PH] (ref 5–6)
PLATELET # BLD AUTO: 324 K/UL (ref 138–453)
PMV BLD AUTO: 9.8 FL (ref 8.1–13.5)
POTASSIUM SERPL-SCNC: 3.7 MMOL/L (ref 3.7–5.3)
PROT UR STRIP-MCNC: ABNORMAL MG/DL
PROT UR STRIP-MCNC: ABNORMAL MG/DL
RBC # BLD AUTO: 3.37 M/UL (ref 3.95–5.11)
RBC # BLD: ABNORMAL 10*6/UL
RBC #/AREA URNS HPF: ABNORMAL /HPF (ref 0–4)
RBC #/AREA URNS HPF: ABNORMAL /HPF (ref 0–4)
SODIUM SERPL-SCNC: 134 MMOL/L (ref 135–144)
SP GR UR STRIP: 1.02 (ref 1.01–1.02)
SP GR UR STRIP: 1.02 (ref 1.01–1.02)
SPECIMEN TYPE: NORMAL
UROBILINOGEN UR STRIP-ACNC: NORMAL EU/DL (ref 0–1)
UROBILINOGEN UR STRIP-ACNC: NORMAL EU/DL (ref 0–1)
WBC #/AREA URNS HPF: ABNORMAL /HPF (ref 0–4)
WBC #/AREA URNS HPF: ABNORMAL /HPF (ref 0–4)
WBC OTHER # BLD: 18.1 K/UL (ref 3.5–11.3)
YEAST URNS QL MICRO: ABNORMAL

## 2024-06-27 PROCEDURE — 99024 POSTOP FOLLOW-UP VISIT: CPT | Performed by: PHYSICIAN ASSISTANT

## 2024-06-27 PROCEDURE — 6360000002 HC RX W HCPCS

## 2024-06-27 PROCEDURE — 2580000003 HC RX 258

## 2024-06-27 PROCEDURE — 85025 COMPLETE CBC W/AUTO DIFF WBC: CPT

## 2024-06-27 PROCEDURE — 94761 N-INVAS EAR/PLS OXIMETRY MLT: CPT

## 2024-06-27 PROCEDURE — 99231 SBSQ HOSP IP/OBS SF/LOW 25: CPT | Performed by: INTERNAL MEDICINE

## 2024-06-27 PROCEDURE — 2060000000 HC ICU INTERMEDIATE R&B

## 2024-06-27 PROCEDURE — 83735 ASSAY OF MAGNESIUM: CPT

## 2024-06-27 PROCEDURE — 6370000000 HC RX 637 (ALT 250 FOR IP)

## 2024-06-27 PROCEDURE — 94640 AIRWAY INHALATION TREATMENT: CPT

## 2024-06-27 PROCEDURE — 36415 COLL VENOUS BLD VENIPUNCTURE: CPT

## 2024-06-27 PROCEDURE — 2700000000 HC OXYGEN THERAPY PER DAY

## 2024-06-27 PROCEDURE — 80048 BASIC METABOLIC PNL TOTAL CA: CPT

## 2024-06-27 PROCEDURE — APPNB15 APP NON BILLABLE TIME 0-15 MINS

## 2024-06-27 PROCEDURE — 97110 THERAPEUTIC EXERCISES: CPT

## 2024-06-27 PROCEDURE — 82570 ASSAY OF URINE CREATININE: CPT

## 2024-06-27 PROCEDURE — 81001 URINALYSIS AUTO W/SCOPE: CPT

## 2024-06-27 PROCEDURE — 71045 X-RAY EXAM CHEST 1 VIEW: CPT

## 2024-06-27 RX ORDER — FUROSEMIDE 10 MG/ML
20 INJECTION INTRAMUSCULAR; INTRAVENOUS ONCE
Status: COMPLETED | OUTPATIENT
Start: 2024-06-27 | End: 2024-06-27

## 2024-06-27 RX ADMIN — IPRATROPIUM BROMIDE AND ALBUTEROL SULFATE 1 DOSE: 2.5; .5 SOLUTION RESPIRATORY (INHALATION) at 07:26

## 2024-06-27 RX ADMIN — MOMETASONE FUROATE AND FORMOTEROL FUMARATE DIHYDRATE 2 PUFF: 200; 5 AEROSOL RESPIRATORY (INHALATION) at 07:26

## 2024-06-27 RX ADMIN — SODIUM CHLORIDE, PRESERVATIVE FREE 10 ML: 5 INJECTION INTRAVENOUS at 21:30

## 2024-06-27 RX ADMIN — ATORVASTATIN CALCIUM 40 MG: 40 TABLET, FILM COATED ORAL at 09:38

## 2024-06-27 RX ADMIN — FLUCONAZOLE 200 MG: 2 INJECTION, SOLUTION INTRAVENOUS at 15:24

## 2024-06-27 RX ADMIN — OXYCODONE HYDROCHLORIDE 10 MG: 5 TABLET ORAL at 00:19

## 2024-06-27 RX ADMIN — SODIUM CHLORIDE, PRESERVATIVE FREE 10 ML: 5 INJECTION INTRAVENOUS at 09:49

## 2024-06-27 RX ADMIN — SERTRALINE 100 MG: 50 TABLET, FILM COATED ORAL at 09:38

## 2024-06-27 RX ADMIN — AMIODARONE HYDROCHLORIDE 200 MG: 200 TABLET ORAL at 09:38

## 2024-06-27 RX ADMIN — DEXTROSE AND SODIUM CHLORIDE: 5; 450 INJECTION, SOLUTION INTRAVENOUS at 00:14

## 2024-06-27 RX ADMIN — FUROSEMIDE 20 MG: 10 INJECTION, SOLUTION INTRAMUSCULAR; INTRAVENOUS at 05:42

## 2024-06-27 RX ADMIN — IPRATROPIUM BROMIDE AND ALBUTEROL SULFATE 1 DOSE: 2.5; .5 SOLUTION RESPIRATORY (INHALATION) at 15:03

## 2024-06-27 RX ADMIN — SUCRALFATE 1 G: 1 TABLET ORAL at 05:26

## 2024-06-27 RX ADMIN — MICONAZOLE NITRATE: 20 POWDER TOPICAL at 09:50

## 2024-06-27 RX ADMIN — DOCUSATE SODIUM 100 MG: 100 CAPSULE, LIQUID FILLED ORAL at 09:38

## 2024-06-27 RX ADMIN — OXYCODONE HYDROCHLORIDE 10 MG: 5 TABLET ORAL at 05:26

## 2024-06-27 RX ADMIN — MIDODRINE HYDROCHLORIDE 10 MG: 5 TABLET ORAL at 09:38

## 2024-06-27 RX ADMIN — IPRATROPIUM BROMIDE AND ALBUTEROL SULFATE 1 DOSE: 2.5; .5 SOLUTION RESPIRATORY (INHALATION) at 10:25

## 2024-06-27 RX ADMIN — DEXTROSE AND SODIUM CHLORIDE: 5; 450 INJECTION, SOLUTION INTRAVENOUS at 17:55

## 2024-06-27 RX ADMIN — CEFOXITIN SODIUM 2000 MG: 2 POWDER, FOR SOLUTION INTRAVENOUS at 21:30

## 2024-06-27 RX ADMIN — SUCRALFATE 1 G: 1 TABLET ORAL at 09:38

## 2024-06-27 RX ADMIN — MICONAZOLE NITRATE: 20 POWDER TOPICAL at 21:29

## 2024-06-27 RX ADMIN — SUCRALFATE 1 G: 1 TABLET ORAL at 15:25

## 2024-06-27 RX ADMIN — DEXTROSE AND SODIUM CHLORIDE: 5; 450 INJECTION, SOLUTION INTRAVENOUS at 09:22

## 2024-06-27 RX ADMIN — PANTOPRAZOLE SODIUM 40 MG: 40 TABLET, DELAYED RELEASE ORAL at 09:38

## 2024-06-27 RX ADMIN — IPRATROPIUM BROMIDE AND ALBUTEROL SULFATE 1 DOSE: 2.5; .5 SOLUTION RESPIRATORY (INHALATION) at 19:56

## 2024-06-27 RX ADMIN — CEFOXITIN SODIUM 2000 MG: 2 POWDER, FOR SOLUTION INTRAVENOUS at 15:21

## 2024-06-27 RX ADMIN — MOMETASONE FUROATE AND FORMOTEROL FUMARATE DIHYDRATE 2 PUFF: 200; 5 AEROSOL RESPIRATORY (INHALATION) at 19:56

## 2024-06-27 RX ADMIN — CEFOXITIN SODIUM 2000 MG: 2 POWDER, FOR SOLUTION INTRAVENOUS at 05:10

## 2024-06-27 ASSESSMENT — PAIN DESCRIPTION - ORIENTATION
ORIENTATION: MID

## 2024-06-27 ASSESSMENT — PAIN - FUNCTIONAL ASSESSMENT
PAIN_FUNCTIONAL_ASSESSMENT: PREVENTS OR INTERFERES SOME ACTIVE ACTIVITIES AND ADLS
PAIN_FUNCTIONAL_ASSESSMENT: ACTIVITIES ARE NOT PREVENTED
PAIN_FUNCTIONAL_ASSESSMENT: PREVENTS OR INTERFERES SOME ACTIVE ACTIVITIES AND ADLS

## 2024-06-27 ASSESSMENT — PAIN DESCRIPTION - LOCATION
LOCATION: ABDOMEN

## 2024-06-27 ASSESSMENT — PAIN SCALES - GENERAL
PAINLEVEL_OUTOF10: 8
PAINLEVEL_OUTOF10: 4
PAINLEVEL_OUTOF10: 2
PAINLEVEL_OUTOF10: 7

## 2024-06-27 ASSESSMENT — PAIN DESCRIPTION - DESCRIPTORS
DESCRIPTORS: ACHING;SHARP
DESCRIPTORS: ACHING
DESCRIPTORS: ACHING

## 2024-06-27 NOTE — PROGRESS NOTES
Hospitalist Progress Note    Patient:  Soumya Smith     YOB: 1933    MRN: [id number]   Admit date: 6/23/2024     Acct: [id number]     PCP: Jannet Wallace APRN - CNP    CC--Interval History: POD 3 open right hemicolectomy for adenocarcinoma colon--6.24.2024    High output CINDI---labs--pending    Atrial fibrillation    AS--III/VI CELIO    DM2--BG = 114    See note below    All other ROS negative except noted in HPI    Diet:  Diet NPO Exceptions are: Sips of Water with Meds, Ice Chips, Popsicles, Other (Specify); Specify Other Exceptions: Is allowed broth, coffee and tea    Medications:  Scheduled Meds:   sodium chloride flush  10 mL IntraVENous 2 times per day    cefOXitin  2,000 mg IntraVENous Q8H    fluconazole  200 mg IntraVENous Q24H    midodrine  10 mg Oral TID WC    miconazole   Topical BID    ipratropium 0.5 mg-albuterol 2.5 mg  1 Dose Inhalation Q4H WA RT    amiodarone  200 mg Oral Daily    atorvastatin  40 mg Oral Daily    docusate sodium  100 mg Oral Daily    pantoprazole  40 mg Oral Daily    sertraline  100 mg Oral Daily    sucralfate  1 g Oral TID AC    mometasone-formoterol  2 puff Inhalation BID RT    tiotropium  2 puff Inhalation Daily RT     Continuous Infusions:   sodium chloride Stopped (06/25/24 0159)    dextrose 5 % and 0.45 % NaCl 125 mL/hr at 06/27/24 0922     PRN Meds:metoprolol, sodium chloride flush, sodium chloride, potassium chloride **OR** potassium alternative oral replacement **OR** potassium chloride, magnesium sulfate, ondansetron **OR** ondansetron, oxyCODONE **OR** oxyCODONE, HYDROmorphone, sodium chloride nebulizer, albuterol, acetaminophen    Objective:  Labs:  CBC with Differential:    Lab Results   Component Value Date/Time    WBC 18.1 06/27/2024 05:04 AM    RBC 3.37 06/27/2024 05:04 AM    HGB 9.4 06/27/2024 05:04 AM    HCT 30.1 06/27/2024 05:04 AM     06/27/2024 05:04 AM    MCV 89.3 06/27/2024 05:04 AM    MCH 27.9 06/27/2024 05:04 AM    MCHC 31.2 06/27/2024

## 2024-06-27 NOTE — PROGRESS NOTES
Sandra Krueger, PPatient Assessment complete. 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   Vitals:    06/27/24 1158   BP: 109/65   Pulse: 91   Resp:    Temp: 97.5 °F (36.4 °C)   SpO2: 90%   . Patients home meds are   Prior to Admission medications    Medication Sig Start Date End Date Taking? Authorizing Provider   magnesium oxide (MAG-OX) 400 (240 Mg) MG tablet  5/1/24   Lakeisha Vizcarra MD   ASPERCREME LIDOCAINE 4 % CREA  5/8/24   Lakeisha Vizcarra MD   LIDOCAINE PAIN RELIEF 4 % external patch  4/15/24   Lakeisha Vizcarra MD   acetaminophen (TYLENOL) 500 MG tablet Take 2 tablets by mouth every 6 hours as needed for Fever or Pain 12/8/23 12/7/24  Lakeisha Vizcarra MD   albuterol sulfate HFA (PROVENTIL;VENTOLIN;PROAIR) 108 (90 Base) MCG/ACT inhaler Inhale 2 puffs into the lungs every 4 hours as needed for Shortness of Breath or Wheezing 5/30/23   Lakeisha Vizcarra MD   albuterol (PROVENTIL) (2.5 MG/3ML) 0.083% nebulizer solution Inhale 3 mLs into the lungs every 4 hours as needed for Shortness of Breath or Wheezing 3/16/23   Lakeisha Vizcarra MD   amiodarone (CORDARONE) 200 MG tablet Take 1 tablet by mouth daily 11/23/22   Lakeisha Vizcarra MD   apixaban (ELIQUIS) 2.5 MG TABS tablet Take 2 tablets by mouth 2 times daily    Lakeisha Vizcarra MD   ascorbic acid (VITAMIN C) 250 MG tablet Take 1 tablet by mouth daily    Lakeisha Vizcarra MD   docusate (COLACE, DULCOLAX) 100 MG CAPS Take 100 mg by mouth daily    Lakeisha Vizcarra MD   fluticasone-umeclidin-vilant (TRELEGY ELLIPTA) 100-62.5-25 MCG/ACT AEPB inhaler Inhale 1 puff into the lungs daily    Lakeisha Vizcarra MD   furosemide (LASIX) 40 MG tablet Take 1 tablet by mouth daily 12/8/23   Lakeisha Vizcarra MD   pantoprazole (PROTONIX) 40 MG tablet Take 1 tablet by mouth daily 4/3/23   Provider,  2.46 2.67 2.89 3.12 3.36 3.60 3.85 4.11   58 - 61 2.10 2.28 2.46 2.65 2.84 3.04 3.24 3.45 58 - 61 2.32 2.54 2.76 2.99 3.23 3.47 3.72 3.98   62 - 65 1.99 2.17 2.35 2.54 2.73 2.93 3.13 3.34 62 - 65 2.19 2.40 2.62 2.85 3.09 3.33 3.58 3.84   66 - 69 1.88 2.05 2.23 2.42 2.61 2.81 3.02 3.23 66 - 69 2.04 2.26 2.48 2.71 2.95 3.19 3.44 3.70   70+ 1.82 1.99 2.17 2.36 2.55 2.75 2.95 3.16 70+ 1.97 2.19 2.41 2.64 2.87 3.12 3.37 3.62             Predicted Peak Expiratory Flow Rate                                       Height (in)  Female       Height (in) Male           Age 56 58 60 62 64 66 68 70 Age            20 344 357 372 387 402 417 432 446  60 62 64 66 68 70 72 74 76   25 337 352 366 381 396 411 426 441 25 447 476 505 533 562 [phone] 677   30 329 344 359 374 389 404 419 434 30 437 466 494 523 552 580 609 638 667   35 322 337 351 366 381 396 411 426 35 426 455 484 [phone] 657   40 314 329 344 359 374 389 404 419 40 416 445 473 502 531 559 588 617 647   45 307 322 336 351 366 381 396 411 45 405 434 463 491 520 549 577 606 636   50 299 314 329 344 359 374 389 404 50 395 424 452 481 510 538 567 596 625   55 292 307 321 336 351 366 381 396 55 384 413 442 470 499 528 556 585 615   60 284 299 314 329 344 359 374 389 60 374 403 431 460 489 517 546 575 605   65 277 292 306 321 336 351 366 381 65 363 392 421 449 478 507 535 564 594   70 269 284 299 314 329 344 359 374 70 353 382 410 439 468 496 525 554 583   75 261 274 289 305 319 334 348 364 75 344 372 400 429 458 487 515 544 573   80 253 266 282 296 312 327 342 356 80 335 362 390 419 448 476 [phone]

## 2024-06-27 NOTE — PROGRESS NOTES
Occupational Therapy    Patient met with refusal for OT this afternoon. Stated would like to remain in bed and rest. Educated patient on role and benefit of completing OT with questionable understanding and continued refusal. Nursing informed. Will attempt tomorrow (6/28/24).    FARIDA Gonzalez/SUMANTH

## 2024-06-27 NOTE — PROGRESS NOTES
Physical Therapy  Facility/Department: [facility]  PROGRESSIVE CARE  Daily Treatment Note  NAME: Soumya Smith  : 3/14/1933  MRN: [id number]    Date of Service: 2024    Discharge Recommendations:  Subacute/Skilled Nursing Facility, Long Term Care with PT, Continue to assess pending progress        Patient Diagnosis(es): The encounter diagnosis was Malignant neoplasm of hepatic flexure (HCC).    Assessment   Activity Tolerance: Patient limited by pain;Patient limited by fatigue;Patient limited by endurance     Plan    Physical Therapy Plan  General Plan: 5-7 times per week  Current Treatment Recommendations: Strengthening;ROM;Balance training;Functional mobility training;Transfer training;Neuromuscular re-education;Gait training;Home exercise program;Safety education & training;Patient/Caregiver education & training;Return to work related activity;Equipment evaluation, education, & procurement     Restrictions  Restrictions/Precautions  Restrictions/Precautions: Surgical Protocols, Fall Risk     Subjective    Subjective  Subjective: Pt in bed sleeping upon arrival. Upon waking up pt agreeable to session.  Pain: Abdominal pain, not rated  Orientation  Orientation Level: Oriented to person;Disoriented to situation     Objective   Vitals  O2 Device: Nasal cannula  Bed Mobility Training  Bed Mobility Training: No  Transfer Training  Transfer Training: No  Gait  Gait Training: No  Neuromuscular Education  Neuromuscular Education: No  PT Exercises  Exercise Treatment: Supine ankle pumps x 10  A/AROM Exercises: Heel slides, hip abd 5 reps x 2, assist from therapist (Frequent verbal cues provided to keep awake and stay on task)     Safety Devices  Type of Devices: Left in bed;Bed alarm in place;Call light within reach;Nurse notified       Goals  Short Term Goals  Time Frame for Short Term Goals: LOS  Short Term Goal 1: Bed mobility with mod x 1  Short Term Goal 2: Transfers with mod x 1  Short Term Goal 3: Amb x 5steps

## 2024-06-27 NOTE — PLAN OF CARE
Problem: Discharge Planning  Goal: Discharge to home or other facility with appropriate resources  6/26/2024 2116 by Nicole Clement RN  Outcome: Progressing  6/26/2024 1741 by Ayah Betancur RN  Outcome: Progressing  Flowsheets (Taken 6/26/2024 0900)  Discharge to home or other facility with appropriate resources:   Identify barriers to discharge with patient and caregiver   Arrange for needed discharge resources and transportation as appropriate   Identify discharge learning needs (meds, wound care, etc)   Refer to discharge planning if patient needs post-hospital services based on physician order or complex needs related to functional status, cognitive ability or social support system  Note: Plan for ECF at discharge but unsure of which facility at this time     Problem: Pain  Goal: Verbalizes/displays adequate comfort level or baseline comfort level  6/26/2024 2116 by Nicole Clement RN  Outcome: Progressing  6/26/2024 1741 by Ayah Betancur RN  Outcome: Progressing  Flowsheets (Taken 6/26/2024 1741)  Verbalizes/displays adequate comfort level or baseline comfort level:   Encourage patient to monitor pain and request assistance   Assess pain using appropriate pain scale   Administer analgesics based on type and severity of pain and evaluate response   Implement non-pharmacological measures as appropriate and evaluate response  Note: No c/o pain at this time     Problem: ABCDS Injury Assessment  Goal: Absence of physical injury  6/26/2024 2116 by Nciole Clement RN  Outcome: Progressing  6/26/2024 1741 by Ayah Betancur RN  Outcome: Progressing  Flowsheets (Taken 6/26/2024 1741)  Absence of Physical Injury: Implement safety measures based on patient assessment  Note: Safety maintained; remains free from falls at this time     Problem: Safety - Adult  Goal: Free from fall injury  6/26/2024 2116 by Nicole Clement RN  Outcome: Progressing  6/26/2024 1741 by Ayah Betancur RN  Outcome:  Monitor blood pressure and heart rate   Monitor urine output and notify Licensed Independent Practitioner for values outside of normal range   Assess for signs of decreased cardiac output   Administer fluid and/or volume expanders as ordered  Goal: Absence of cardiac dysrhythmias or at baseline  6/26/2024 2116 by Nicole Clement, RN  Outcome: Progressing  6/26/2024 1741 by Ayah Betancur, RN  Outcome: Progressing  Flowsheets (Taken 6/26/2024 0900)  Absence of cardiac dysrhythmias or at baseline:   Monitor cardiac rate and rhythm   Assess for signs of decreased cardiac output   Administer antiarrhythmia medication and electrolyte replacement as ordered

## 2024-06-27 NOTE — PROGRESS NOTES
General Surgery - Neri Paulino PA-C  Daily Progress Note  Pt Name: Soumya Smith  Medical Record Number: [id number]  Date of Birth 3/14/1933   Today's Date: 6/27/2024      SUBJECTIVE  Postop day 3 open right hemicolectomy  Wakes up easily for me, states pain is a little better but still and points to the right edge of the middle of the abdomen, pressures are stable,  no fevers  She denies any nausea,   CINDI drain shows serosanguineous fluid, is getting a significant amount out  Has more edema of the abdominal wall and LE's today      OBJECTIVE  CURRENT VITALS /65   Pulse 91   Temp 97.5 °F (36.4 °C) (Temporal)   Resp 18   Ht 1.626 m (5' 4\")   Wt 95.5 kg (210 lb 8 oz)   SpO2 90%   BMI 36.13 kg/m²   GENERAL: Alert, cooperative, no distress  LUNGS: Speaking in complete sentences, is on O2 via nasal cannula, appears comfortable  HEART: Normal rate, regular rhythm, and intact distal pulses  ABDOMEN: ABD pads bandaged over the central incision, not soaked no drainage around them, is tender on the sides of wound, CINDI drain is in the right lower quadrant shows serosanguineous fluid and is full,  bowel sounds are present throughout but hypoactive today, she has some pitting edema of the lower abdominal wall, no crepitus  EXTERMITY: 2+-3+ edema  24 HR INTAKE/OUTPUT :   Intake/Output Summary (Last 24 hours) at 6/27/2024 1343  Last data filed at 6/27/2024 1000  Gross per 24 hour   Intake 2000.16 ml   Output 1685 ml   Net 315.16 ml     DRAIN/TUBE OUTPUT :  ,    Current Facility-Administered Medications   Medication Dose Route Frequency Provider Last Rate Last Admin    metoprolol (LOPRESSOR) injection 2.5 mg  2.5 mg IntraVENous Q6H PRN Hannah Jean Baptiste L, APRN - NP        sodium chloride flush 0.9 % injection 10 mL  10 mL IntraVENous 2 times per day Hannah Jean Baptiste, APRN - NP   10 mL at 06/27/24 0949    sodium chloride flush 0.9 % injection 10 mL  10 mL IntraVENous PRN Hannah Jean Baptiste, APRN - NP        0.9 % sodium  for an open left hemicolectomy, admitted to the floor, was initially placed on Levophed to assist her blood pressures, is now off Levophed and doing okay with her pressures, has stable vitals, pain is as expected, CINDI drain showing serosanguineous fluid, she does have worsening edema of the abdominal wall and lower extremities which is pitting, I think vascularly she may be low volume status and just third spacing significantly, albumin levels are low, will ask hospitalist team to give further input and recommendations, continue antibiotics for now, not going to advance the diet yet as she is not passing gas yet      PLAN  1.  Continue cefoxitin  2.  Continue broth coffee and tea with ice chips for diet today  3.  Continue respiratory therapy PT and OT  4.  Hospitalist for medical management and to assist with management of fluid status  5.  Ok with starting DVT prx  6.  Continue IV pain control with Dilaudid  7.  Continue IV fluids    Neri Paulnio PA-C,   Electronically signed 6/27/2024 at 1:43 PM

## 2024-06-27 NOTE — PLAN OF CARE
Problem: Discharge Planning  Goal: Discharge to home or other facility with appropriate resources  Outcome: Not Progressing     Problem: Pain  Goal: Verbalizes/displays adequate comfort level or baseline comfort level  Outcome: Not Progressing     Problem: ABCDS Injury Assessment  Goal: Absence of physical injury  Outcome: Not Progressing     Problem: Safety - Adult  Goal: Free from fall injury  Outcome: Not Progressing     Problem: Gastrointestinal - Adult  Goal: Minimal or absence of nausea and vomiting  Outcome: Not Progressing  Goal: Maintains or returns to baseline bowel function  Outcome: Not Progressing     Problem: Skin/Tissue Integrity - Adult  Goal: Skin integrity remains intact  Outcome: Not Progressing  Goal: Incisions, wounds, or drain sites healing without S/S of infection  Outcome: Not Progressing  Goal: Oral mucous membranes remain intact  Outcome: Not Progressing     Problem: Musculoskeletal - Adult  Goal: Return mobility to safest level of function  Outcome: Not Progressing  Goal: Maintain proper alignment of affected body part  Outcome: Not Progressing  Goal: Return ADL status to a safe level of function  Outcome: Not Progressing     Problem: Genitourinary - Adult  Goal: Absence of urinary retention  Outcome: Not Progressing     Problem: Infection - Adult  Goal: Absence of infection at discharge  Outcome: Not Progressing  Goal: Absence of infection during hospitalization  Outcome: Not Progressing  Goal: Absence of fever/infection during anticipated neutropenic period  Outcome: Not Progressing     Problem: Hematologic - Adult  Goal: Maintains hematologic stability  Outcome: Not Progressing     Problem: Respiratory - Adult  Goal: Achieves optimal ventilation and oxygenation  Outcome: Not Progressing     Problem: Cardiovascular - Adult  Goal: Maintains optimal cardiac output and hemodynamic stability  Outcome: Not Progressing  Goal: Absence of cardiac dysrhythmias or at baseline  Outcome: Not  intact  Outcome: Not Progressing  Goal: Incisions, wounds, or drain sites healing without S/S of infection  Outcome: Not Progressing  Goal: Oral mucous membranes remain intact  Outcome: Not Progressing     Problem: Musculoskeletal - Adult  Goal: Return mobility to safest level of function  Outcome: Not Progressing  Goal: Maintain proper alignment of affected body part  Outcome: Not Progressing  Goal: Return ADL status to a safe level of function  Outcome: Not Progressing     Problem: Genitourinary - Adult  Goal: Absence of urinary retention  Outcome: Not Progressing     Problem: Infection - Adult  Goal: Absence of infection at discharge  Outcome: Not Progressing  Goal: Absence of infection during hospitalization  Outcome: Not Progressing  Goal: Absence of fever/infection during anticipated neutropenic period  Outcome: Not Progressing     Problem: Hematologic - Adult  Goal: Maintains hematologic stability  Outcome: Not Progressing     Problem: Respiratory - Adult  Goal: Achieves optimal ventilation and oxygenation  Outcome: Not Progressing     Problem: Cardiovascular - Adult  Goal: Maintains optimal cardiac output and hemodynamic stability  Outcome: Not Progressing  Goal: Absence of cardiac dysrhythmias or at baseline  Outcome: Not Progressing

## 2024-06-27 NOTE — PROGRESS NOTES
Primary RN reports patient with 490 ml in CINDI bulb, and urine output 600 ml since 0700 yesterday, pending soler emptying at this time. Will place on strict I&O and place urometer on for monitoring urine output, Lasix 20 mg x1 dose. Patient also noted to have >9kg weight gain since admission. Currently >8L (+) on fluid intake vs output.

## 2024-06-28 ENCOUNTER — APPOINTMENT (OUTPATIENT)
Dept: GENERAL RADIOLOGY | Age: 89
End: 2024-06-28
Attending: FAMILY MEDICINE
Payer: MEDICARE

## 2024-06-28 ENCOUNTER — ANESTHESIA EVENT (OUTPATIENT)
Dept: INPATIENT UNIT | Age: 89
End: 2024-06-28
Payer: MEDICARE

## 2024-06-28 ENCOUNTER — ANESTHESIA (OUTPATIENT)
Dept: INPATIENT UNIT | Age: 89
End: 2024-06-28
Payer: MEDICARE

## 2024-06-28 LAB
ALBUMIN SERPL-MCNC: 1.9 G/DL (ref 3.5–5.2)
ANION GAP SERPL CALCULATED.3IONS-SCNC: 11 MMOL/L (ref 9–17)
BASOPHILS # BLD: 0 K/UL (ref 0–0.2)
BASOPHILS # BLD: 0.06 K/UL (ref 0–0.2)
BASOPHILS NFR BLD: 0 % (ref 0–1)
BASOPHILS NFR BLD: 0 % (ref 0–2)
BUN SERPL-MCNC: 18 MG/DL (ref 8–23)
BUN/CREAT SERPL: 13 (ref 9–20)
CALCIUM SERPL-MCNC: 7.5 MG/DL (ref 8.6–10.4)
CHLORIDE SERPL-SCNC: 106 MMOL/L (ref 98–107)
CO2 SERPL-SCNC: 18 MMOL/L (ref 20–31)
CREAT SERPL-MCNC: 1.4 MG/DL (ref 0.5–0.9)
EKG Q-T INTERVAL: 280 MS
EKG QRS DURATION: 126 MS
EKG QTC CALCULATION (BAZETT): 486 MS
EKG R AXIS: -64 DEGREES
EKG T AXIS: 88 DEGREES
EKG VENTRICULAR RATE: 181 BPM
EOSINOPHIL # BLD: 0.18 K/UL (ref 0–0.44)
EOSINOPHIL # BLD: 0.24 K/UL (ref 0–0.4)
EOSINOPHILS RELATIVE PERCENT: 1 % (ref 1–4)
EOSINOPHILS RELATIVE PERCENT: 1 % (ref 1–7)
ERYTHROCYTE [DISTWIDTH] IN BLOOD BY AUTOMATED COUNT: 15.3 % (ref 11.8–14.4)
ERYTHROCYTE [DISTWIDTH] IN BLOOD BY AUTOMATED COUNT: 15.4 % (ref 11.8–14.4)
FIO2: 55
FIO2: 60
GFR, ESTIMATED: 36 ML/MIN/1.73M2
GLUCOSE BLD-MCNC: 129 MG/DL (ref 65–105)
GLUCOSE BLD-MCNC: 134 MG/DL (ref 65–105)
GLUCOSE SERPL-MCNC: 126 MG/DL (ref 70–99)
HCO3 VENOUS: 15.4 MMOL/L (ref 22–29)
HCO3 VENOUS: 16 MMOL/L (ref 22–29)
HCT VFR BLD AUTO: 31.2 % (ref 36.3–47.1)
HCT VFR BLD AUTO: 31.8 % (ref 36.3–47.1)
HGB BLD-MCNC: 10 G/DL (ref 11.9–15.1)
HGB BLD-MCNC: 10.2 G/DL (ref 11.9–15.1)
IMM GRANULOCYTES # BLD AUTO: 0 K/UL (ref 0–0.3)
IMM GRANULOCYTES # BLD AUTO: 0.21 K/UL (ref 0–0.3)
IMM GRANULOCYTES NFR BLD: 0 %
IMM GRANULOCYTES NFR BLD: 1 %
LACTATE BLDV-SCNC: 1.7 MMOL/L (ref 0.5–1.9)
LYMPHOCYTES NFR BLD: 0.71 K/UL (ref 1–4.8)
LYMPHOCYTES NFR BLD: 1.83 K/UL (ref 1.1–3.7)
LYMPHOCYTES RELATIVE PERCENT: 3 % (ref 16–46)
LYMPHOCYTES RELATIVE PERCENT: 8 % (ref 24–43)
MAGNESIUM SERPL-MCNC: 1.7 MG/DL (ref 1.6–2.6)
MCH RBC QN AUTO: 27.7 PG (ref 25.2–33.5)
MCH RBC QN AUTO: 27.9 PG (ref 25.2–33.5)
MCHC RBC AUTO-ENTMCNC: 32.1 G/DL (ref 25.2–33.5)
MCHC RBC AUTO-ENTMCNC: 32.1 G/DL (ref 25.2–33.5)
MCV RBC AUTO: 86.4 FL (ref 82.6–102.9)
MCV RBC AUTO: 86.9 FL (ref 82.6–102.9)
MONOCYTES NFR BLD: 0.94 K/UL (ref 0.1–1.2)
MONOCYTES NFR BLD: 1.01 K/UL (ref 0.1–1.2)
MONOCYTES NFR BLD: 4 % (ref 3–12)
MONOCYTES NFR BLD: 4 % (ref 4–11)
MORPHOLOGY: ABNORMAL
MORPHOLOGY: ABNORMAL
NEGATIVE BASE EXCESS, VEN: 8.2 MMOL/L (ref 0–2)
NEGATIVE BASE EXCESS, VEN: 8.5 MMOL/L (ref 0–2)
NEUTROPHILS NFR BLD: 86 % (ref 36–65)
NEUTROPHILS NFR BLD: 92 % (ref 43–77)
NEUTS SEG NFR BLD: 19.87 K/UL (ref 1.5–8.1)
NEUTS SEG NFR BLD: 21.61 K/UL (ref 1.5–8.1)
NRBC BLD-RTO: 0 PER 100 WBC
NRBC BLD-RTO: 0 PER 100 WBC
O2 DELIVERY DEVICE: ABNORMAL
O2 DELIVERY DEVICE: ABNORMAL
O2 SAT, VEN: 93.8 % (ref 60–85)
O2 SAT, VEN: 97.6 % (ref 60–85)
PCO2 VENOUS: 26.6 MM HG (ref 41–51)
PCO2 VENOUS: 28.7 MM HG (ref 41–51)
PH VENOUS: 7.36 (ref 7.32–7.43)
PH VENOUS: 7.37 (ref 7.32–7.43)
PHOSPHATE SERPL-MCNC: 2.7 MG/DL (ref 2.6–4.5)
PLATELET # BLD AUTO: 364 K/UL (ref 138–453)
PLATELET # BLD AUTO: 379 K/UL (ref 138–453)
PMV BLD AUTO: 9.4 FL (ref 8.1–13.5)
PMV BLD AUTO: 9.4 FL (ref 8.1–13.5)
PO2 VENOUS: 71.6 MM HG (ref 30–50)
PO2 VENOUS: 97.6 MM HG (ref 30–50)
POTASSIUM SERPL-SCNC: 3.8 MMOL/L (ref 3.7–5.3)
RBC # BLD AUTO: 3.61 M/UL (ref 3.95–5.11)
RBC # BLD AUTO: 3.66 M/UL (ref 3.95–5.11)
RBC # BLD: ABNORMAL 10*6/UL
SODIUM SERPL-SCNC: 135 MMOL/L (ref 135–144)
SURGICAL PATHOLOGY REPORT: NORMAL
WBC OTHER # BLD: 23.2 K/UL (ref 3.5–11.3)
WBC OTHER # BLD: 23.5 K/UL (ref 3.5–11.3)

## 2024-06-28 PROCEDURE — 82040 ASSAY OF SERUM ALBUMIN: CPT

## 2024-06-28 PROCEDURE — 87040 BLOOD CULTURE FOR BACTERIA: CPT

## 2024-06-28 PROCEDURE — 6370000000 HC RX 637 (ALT 250 FOR IP)

## 2024-06-28 PROCEDURE — 71045 X-RAY EXAM CHEST 1 VIEW: CPT

## 2024-06-28 PROCEDURE — APPNB15 APP NON BILLABLE TIME 0-15 MINS: Performed by: NURSE PRACTITIONER

## 2024-06-28 PROCEDURE — 93005 ELECTROCARDIOGRAM TRACING: CPT | Performed by: INTERNAL MEDICINE

## 2024-06-28 PROCEDURE — 6360000002 HC RX W HCPCS: Performed by: NURSE PRACTITIONER

## 2024-06-28 PROCEDURE — 6360000002 HC RX W HCPCS: Performed by: INTERNAL MEDICINE

## 2024-06-28 PROCEDURE — 2500000003 HC RX 250 WO HCPCS: Performed by: INTERNAL MEDICINE

## 2024-06-28 PROCEDURE — 84100 ASSAY OF PHOSPHORUS: CPT

## 2024-06-28 PROCEDURE — P9047 ALBUMIN (HUMAN), 25%, 50ML: HCPCS | Performed by: NURSE PRACTITIONER

## 2024-06-28 PROCEDURE — 36569 INSJ PICC 5 YR+ W/O IMAGING: CPT | Performed by: NURSE ANESTHETIST, CERTIFIED REGISTERED

## 2024-06-28 PROCEDURE — 2580000003 HC RX 258: Performed by: INTERNAL MEDICINE

## 2024-06-28 PROCEDURE — 2580000003 HC RX 258: Performed by: SURGERY

## 2024-06-28 PROCEDURE — 94761 N-INVAS EAR/PLS OXIMETRY MLT: CPT

## 2024-06-28 PROCEDURE — 2000000000 HC ICU R&B

## 2024-06-28 PROCEDURE — 83605 ASSAY OF LACTIC ACID: CPT

## 2024-06-28 PROCEDURE — 82947 ASSAY GLUCOSE BLOOD QUANT: CPT

## 2024-06-28 PROCEDURE — 83735 ASSAY OF MAGNESIUM: CPT

## 2024-06-28 PROCEDURE — 2700000000 HC OXYGEN THERAPY PER DAY

## 2024-06-28 PROCEDURE — C9113 INJ PANTOPRAZOLE SODIUM, VIA: HCPCS | Performed by: INTERNAL MEDICINE

## 2024-06-28 PROCEDURE — 3E0436Z INTRODUCTION OF NUTRITIONAL SUBSTANCE INTO CENTRAL VEIN, PERCUTANEOUS APPROACH: ICD-10-PCS | Performed by: INTERNAL MEDICINE

## 2024-06-28 PROCEDURE — 2580000003 HC RX 258

## 2024-06-28 PROCEDURE — 82803 BLOOD GASES ANY COMBINATION: CPT

## 2024-06-28 PROCEDURE — 99232 SBSQ HOSP IP/OBS MODERATE 35: CPT | Performed by: INTERNAL MEDICINE

## 2024-06-28 PROCEDURE — 6360000002 HC RX W HCPCS

## 2024-06-28 PROCEDURE — 80048 BASIC METABOLIC PNL TOTAL CA: CPT

## 2024-06-28 PROCEDURE — 2500000003 HC RX 250 WO HCPCS

## 2024-06-28 PROCEDURE — 99233 SBSQ HOSP IP/OBS HIGH 50: CPT | Performed by: PHYSICIAN ASSISTANT

## 2024-06-28 PROCEDURE — 2060000000 HC ICU INTERMEDIATE R&B

## 2024-06-28 PROCEDURE — 36415 COLL VENOUS BLD VENIPUNCTURE: CPT

## 2024-06-28 PROCEDURE — 6370000000 HC RX 637 (ALT 250 FOR IP): Performed by: INTERNAL MEDICINE

## 2024-06-28 PROCEDURE — 94640 AIRWAY INHALATION TREATMENT: CPT

## 2024-06-28 PROCEDURE — 85025 COMPLETE CBC W/AUTO DIFF WBC: CPT

## 2024-06-28 PROCEDURE — 5A09557 ASSISTANCE WITH RESPIRATORY VENTILATION, GREATER THAN 96 CONSECUTIVE HOURS, CONTINUOUS POSITIVE AIRWAY PRESSURE: ICD-10-PCS | Performed by: FAMILY MEDICINE

## 2024-06-28 PROCEDURE — 94660 CPAP INITIATION&MGMT: CPT

## 2024-06-28 RX ORDER — SODIUM CHLORIDE 9 MG/ML
INJECTION, SOLUTION INTRAVENOUS PRN
Status: DISCONTINUED | OUTPATIENT
Start: 2024-06-28 | End: 2024-07-04 | Stop reason: HOSPADM

## 2024-06-28 RX ORDER — SODIUM CHLORIDE 0.9 % (FLUSH) 0.9 %
5-40 SYRINGE (ML) INJECTION PRN
Status: DISCONTINUED | OUTPATIENT
Start: 2024-06-28 | End: 2024-07-04 | Stop reason: HOSPADM

## 2024-06-28 RX ORDER — 0.9 % SODIUM CHLORIDE 0.9 %
500 INTRAVENOUS SOLUTION INTRAVENOUS ONCE
Status: COMPLETED | OUTPATIENT
Start: 2024-06-28 | End: 2024-06-28

## 2024-06-28 RX ORDER — 0.9 % SODIUM CHLORIDE 0.9 %
30 INTRAVENOUS SOLUTION INTRAVENOUS ONCE
Status: DISCONTINUED | OUTPATIENT
Start: 2024-06-28 | End: 2024-06-28

## 2024-06-28 RX ORDER — DIGOXIN 0.25 MG/ML
250 INJECTION INTRAMUSCULAR; INTRAVENOUS DAILY
Status: DISCONTINUED | OUTPATIENT
Start: 2024-06-28 | End: 2024-06-28

## 2024-06-28 RX ORDER — METOPROLOL TARTRATE 1 MG/ML
5 INJECTION, SOLUTION INTRAVENOUS ONCE
Status: COMPLETED | OUTPATIENT
Start: 2024-06-28 | End: 2024-06-28

## 2024-06-28 RX ORDER — DIGOXIN 0.25 MG/ML
INJECTION INTRAMUSCULAR; INTRAVENOUS
Status: COMPLETED
Start: 2024-06-28 | End: 2024-06-28

## 2024-06-28 RX ORDER — DIGOXIN 0.25 MG/ML
250 INJECTION INTRAMUSCULAR; INTRAVENOUS ONCE
Status: DISCONTINUED | OUTPATIENT
Start: 2024-06-28 | End: 2024-06-28

## 2024-06-28 RX ORDER — SODIUM CHLORIDE 0.9 % (FLUSH) 0.9 %
5-40 SYRINGE (ML) INJECTION EVERY 12 HOURS SCHEDULED
Status: DISCONTINUED | OUTPATIENT
Start: 2024-06-28 | End: 2024-07-04 | Stop reason: HOSPADM

## 2024-06-28 RX ORDER — FUROSEMIDE 10 MG/ML
40 INJECTION INTRAMUSCULAR; INTRAVENOUS ONCE
Status: COMPLETED | OUTPATIENT
Start: 2024-06-28 | End: 2024-06-28

## 2024-06-28 RX ORDER — ALBUMIN (HUMAN) 12.5 G/50ML
50 SOLUTION INTRAVENOUS ONCE
Status: COMPLETED | OUTPATIENT
Start: 2024-06-28 | End: 2024-06-29

## 2024-06-28 RX ORDER — METOPROLOL TARTRATE 1 MG/ML
INJECTION, SOLUTION INTRAVENOUS
Status: COMPLETED
Start: 2024-06-28 | End: 2024-06-28

## 2024-06-28 RX ADMIN — DIGOXIN 250 MCG: 250 INJECTION, SOLUTION INTRAMUSCULAR; INTRAVENOUS; PARENTERAL at 13:59

## 2024-06-28 RX ADMIN — METOPROLOL TARTRATE 5 MG: 1 INJECTION, SOLUTION INTRAVENOUS at 13:57

## 2024-06-28 RX ADMIN — IPRATROPIUM BROMIDE AND ALBUTEROL SULFATE 1 DOSE: 2.5; .5 SOLUTION RESPIRATORY (INHALATION) at 19:17

## 2024-06-28 RX ADMIN — SUCRALFATE 1 G: 1 TABLET ORAL at 05:14

## 2024-06-28 RX ADMIN — SODIUM CHLORIDE, PRESERVATIVE FREE 40 MG: 5 INJECTION INTRAVENOUS at 12:01

## 2024-06-28 RX ADMIN — MICONAZOLE NITRATE: 20 POWDER TOPICAL at 20:53

## 2024-06-28 RX ADMIN — MOMETASONE FUROATE AND FORMOTEROL FUMARATE DIHYDRATE 2 PUFF: 200; 5 AEROSOL RESPIRATORY (INHALATION) at 07:50

## 2024-06-28 RX ADMIN — IPRATROPIUM BROMIDE AND ALBUTEROL SULFATE 1 DOSE: 2.5; .5 SOLUTION RESPIRATORY (INHALATION) at 15:29

## 2024-06-28 RX ADMIN — PIPERACILLIN AND TAZOBACTAM 3375 MG: 3; .375 INJECTION, POWDER, LYOPHILIZED, FOR SOLUTION INTRAVENOUS at 18:20

## 2024-06-28 RX ADMIN — CEFOXITIN SODIUM 2000 MG: 2 POWDER, FOR SOLUTION INTRAVENOUS at 14:30

## 2024-06-28 RX ADMIN — METOROPROLOL TARTRATE 5 MG: 5 INJECTION, SOLUTION INTRAVENOUS at 13:57

## 2024-06-28 RX ADMIN — IPRATROPIUM BROMIDE AND ALBUTEROL SULFATE 1 DOSE: 2.5; .5 SOLUTION RESPIRATORY (INHALATION) at 07:51

## 2024-06-28 RX ADMIN — SODIUM CHLORIDE, PRESERVATIVE FREE 10 ML: 5 INJECTION INTRAVENOUS at 21:21

## 2024-06-28 RX ADMIN — SODIUM CHLORIDE 500 ML: 9 INJECTION, SOLUTION INTRAVENOUS at 05:55

## 2024-06-28 RX ADMIN — IPRATROPIUM BROMIDE AND ALBUTEROL SULFATE 1 DOSE: 2.5; .5 SOLUTION RESPIRATORY (INHALATION) at 11:29

## 2024-06-28 RX ADMIN — SODIUM CHLORIDE, PRESERVATIVE FREE 10 ML: 5 INJECTION INTRAVENOUS at 12:02

## 2024-06-28 RX ADMIN — MIDODRINE HYDROCHLORIDE 10 MG: 5 TABLET ORAL at 18:13

## 2024-06-28 RX ADMIN — FUROSEMIDE 40 MG: 10 INJECTION, SOLUTION INTRAMUSCULAR; INTRAVENOUS at 10:19

## 2024-06-28 RX ADMIN — METOROPROLOL TARTRATE 5 MG: 5 INJECTION, SOLUTION INTRAVENOUS at 10:38

## 2024-06-28 RX ADMIN — DIGOXIN 250 MCG: 0.25 INJECTION INTRAMUSCULAR; INTRAVENOUS at 13:59

## 2024-06-28 RX ADMIN — ASCORBIC ACID, VITAMIN A PALMITATE, CHOLECALCIFEROL, THIAMINE HYDROCHLORIDE, RIBOFLAVIN-5 PHOSPHATE SODIUM, PYRIDOXINE HYDROCHLORIDE, NIACINAMIDE, DEXPANTHENOL, ALPHA-TOCOPHEROL ACETATE, VITAMIN K1, FOLIC ACID, BIOTIN, CYANOCOBALAMIN: 200; 3300; 200; 6; 3.6; 6; 40; 15; 10; 150; 600; 60; 5 INJECTION, SOLUTION INTRAVENOUS at 18:23

## 2024-06-28 RX ADMIN — I.V. FAT EMULSION 250 ML: 20 EMULSION INTRAVENOUS at 18:25

## 2024-06-28 RX ADMIN — METOPROLOL TARTRATE 25 MG: 25 TABLET, FILM COATED ORAL at 18:14

## 2024-06-28 RX ADMIN — MICONAZOLE NITRATE: 20 POWDER TOPICAL at 14:30

## 2024-06-28 RX ADMIN — CEFOXITIN SODIUM 2000 MG: 2 POWDER, FOR SOLUTION INTRAVENOUS at 05:14

## 2024-06-28 RX ADMIN — ALBUMIN HUMAN 50 G: 250 SOLUTION INTRAVENOUS at 23:02

## 2024-06-28 RX ADMIN — SUCRALFATE 1 G: 1 TABLET ORAL at 18:14

## 2024-06-28 RX ADMIN — DEXTROSE AND SODIUM CHLORIDE: 5; 450 INJECTION, SOLUTION INTRAVENOUS at 02:30

## 2024-06-28 ASSESSMENT — PAIN SCALES - WONG BAKER
WONGBAKER_NUMERICALRESPONSE: NO HURT
WONGBAKER_NUMERICALRESPONSE: NO HURT

## 2024-06-28 ASSESSMENT — PAIN SCALES - GENERAL
PAINLEVEL_OUTOF10: 2
PAINLEVEL_OUTOF10: 2

## 2024-06-28 NOTE — PROGRESS NOTES
Comprehensive Nutrition Assessment    Type and Reason for Visit:  Initial, NPO/Clear Liquid    Nutrition Recommendations/Plan:   Monitor for initiation of nutrition support.  Follow.     Malnutrition Assessment:  Malnutrition Status:  Severe malnutrition (r/t decreased intakes and sever fluid accumulation) (06/28/24 1039)    Context:  Acute Illness     Findings of the 6 clinical characteristics of malnutrition:  Energy Intake:  50% or less of estimated energy requirements for 5 or more days  Weight Loss:  Unable to assess     Body Fat Loss:  Unable to assess     Muscle Mass Loss:  Unable to assess    Fluid Accumulation:  Moderate to Severe     Strength:       Nutrition Assessment:    Pt has malignant colon cancer. POD #4 right hemicolectomy. Day #5 NPO/CL. Per family, Pt is taking sips of water. Pt allowed posicles at this point but will not eat them per family. Plan is to initiate TPN. Pt has significant abdominal, generalized, and extremity edema. ~25# weight gain and +10L net I/O since admission. Receiving ~1/3 calories as dextrose in IV currently. IVF @ 125 ml/hr. No protein intake. Has RLQ drain in place. Has had one episode of emesis since surgery Pain/nausea persists intermittently. Hypoactive bowel sounds. LBM 6/24 (day of surgery).    Nutrition Related Findings:    Meds/labs reviewed. Wound Type: Surgical Incision       Current Nutrition Intake & Therapies:    Average Meal Intake: NPO  Average Supplements Intake: NPO  Diet NPO Exceptions are: Sips of Water with Meds, Ice Chips, Popsicles, Other (Specify); Specify Other Exceptions: Is allowed broth, coffee and tea    Anthropometric Measures:  Height: 162.6 cm (5' 4\")  Ideal Body Weight (IBW): 120 lbs (55 kg)    Admission Body Weight: 86.5 kg (190 lb 11.2 oz)  Current Body Weight: 97.7 kg (215 lb 4.8 oz), 179.4 % IBW. Weight Source: Bed Scale  Current BMI (kg/m2): 36.9  Usual Body Weight:  (UTD)     Weight Adjustment For:  (inccurate r/t edema)                       Estimated Daily Nutrient Needs:  Energy Requirements Based On: Kcal/kg  Weight Used for Energy Requirements: Admission  Energy (kcal/day): 3287-5179  Weight Used for Protein Requirements: Ideal  Protein (g/day): 60-70     Fluid (ml/day): Per physician    Nutrition Diagnosis:   Severe malnutrition related to acute injury/trauma, altered GI function, altered GI structure as evidenced by Criteria as identified in malnutrition assessment    Nutrition Interventions:   Food and/or Nutrient Delivery: Start Parenteral Nutrition  Nutrition Education/Counseling: Education not indicated  Coordination of Nutrition Care: Continue to monitor while inpatient  Plan of Care discussed with: Family    Goals:  Previous Goal Met: No Progress toward Goal(s)  Goals: PO intake 50% or greater, prior to discharge       Nutrition Monitoring and Evaluation:   Behavioral-Environmental Outcomes: None Identified  Food/Nutrient Intake Outcomes: Parenteral Nutrition Intake/Tolerance, IVF Intake, Food and Nutrient Intake, Diet Advancement/Tolerance  Physical Signs/Symptoms Outcomes: Biochemical Data, GI Status, Fluid Status or Edema, Nausea or Vomiting, Hemodynamic Status, Skin, Weight    Discharge Planning:    Too soon to determine     AVTAR JOHNSON RD  Contact: [phone]

## 2024-06-28 NOTE — PROGRESS NOTES
06/28/24 1052   Encounter Summary   Encounter Overview/Reason Ethics/Mediation   Service Provided For Patient and family together   Referral/Consult From Rounding   Support System Family members;Children   Last Encounter  06/28/24   Complexity of Encounter Moderate   Begin Time 1040   End Time  1050   Total Time Calculated 10 min   Ethics/Mediation   Type Care team Ethics Conference   Assessment/Intervention/Outcome   Intervention Active listening

## 2024-06-28 NOTE — PROGRESS NOTES
MD Lakeisha   potassium chloride (KLOR-CON M) 20 MEQ extended release tablet Take 1 tablet by mouth 2 times daily 12/8/23   Lakeisha Vizcarra MD   sucralfate (CARAFATE) 1 GM tablet Take 1 tablet by mouth 3 times daily (before meals) 2/9/24   Lakeisha Vizcarra MD   ferrous sulfate (IRON 325) 325 (65 Fe) MG tablet Take 1 tablet by mouth daily (with breakfast) 12/8/23 6/5/24  Lakeisha Vizcarra MD   Alendronate Sodium (FOSAMAX PO) Take 1 tablet by mouth once a week    Lakeisha Vizcarra MD   atorvastatin (LIPITOR) 40 MG tablet Take 1 tablet by mouth daily 9/6/16   Lakeisha Vizcarra MD   Cholecalciferol (PA VITAMIN D-3 PO) Take 5,000 Units by mouth daily 8/18/16   Lakeisha Vizcarra MD   sertraline (ZOLOFT) 100 MG tablet Take 1 tablet by mouth daily 9/14/16   Lakeisha Vizcarra MD   .  Recent Surgical History: Lower Abdominal = 2     Assessment Pt on bipap, IS/PF not done.      RR 20  Breath Sounds: diminished      Bronchodilator assessment at level  3  Hyperinflation assessment at level 1  Secretion Management assessment at level  1    [x]    Bronchodilator Assessment  BRONCHODILATOR ASSESSMENT SCORE  Score 0 1 2 3 4 5   Breath Sounds   []  Patient Baseline []  No Wheeze good aeration []  Faint, scattered wheezing, good aeration [x]  Expiratory Wheezing and or moderately diminished []  Insp/Exp wheeze and/or very diminished []  Insp/Exp and/ or marked distress   Respiratory Rate   []  Patient Baseline []  Less than 20 []  Less than 20 [x]  20-25 []  Greater than 25 []  Greater than 25   Peak flow % of Pred or PB [x]  NA   []  Greater than 90%  []  81-90% []  71-80% []  Less than or equal to 70%  or unable to perform []  Unable due to Respiratory Distress   Dyspnea re []  Patient Baseline []  No SOB []  No SOB [x]  SOB on exertion []  SOB min activity []  At rest/acute   e FEV% Predicted       [x]  NA []  Above 69%  []  Unable []  Above 60-69%  []  Unable []  Above 50-59%  []  Unable []  Above  35-49%  []  Unable []  Less than 35%  []  Unable                 [x]  Hyperinflation Assessment  Score 1 2 3   CXR and Breath Sounds   [x]  Clear []  No atelectasis  Basilar aeration []  Atelectasis or absent basilar breath sounds   Incentive Spirometry Volume  (Per IBW)   []  Greater than or equal to 15ml/Kg []  less than 15ml/Kg []  less than 15ml/Kg   Surgery within last 2 weeks []  None or general   [x]  Abdominal or thoracic surgery  []  Abdominal or thoracic   Chronic Pulmonary Historyre [x]  No []  Yes []  Yes     [x]  Secretion Management Assessment  Score 1 2 3   Bilateral Breath Sounds   [x]  Occasional Rhonchi []  Scattered Rhonchi []  Course Rhonchi and/or poor aeration   Sputum    [x]  Small amount of thin secretions []  Moderate amount of viscous secretions []  Copius, Viscious Yellow/ Secretions   CXR as reported by physician []  clear  []  Unavailable [x]  Infiltrates and/or consolidation  []  Unavailable []  Mucus Plugging and or lobar consolidation  []  Unavailable   Cough [x]  Strong, productive cough []  Weak productive cough []  No cough or weak non-productive cough   Heavenly Rainey, [facility]  12:49 PM                            FEMALE                                  MALE                            FEV1 Predicted Normal Values                        FEV1 Predicted Normal Values          Age                                     Height in Feet and Inches       Age                                     Height in Feet and Inches       4' 11\" 5' 1\" 5' 3\" 5' 5\" 5' 7\" 5' 9\" 5' 11\" 6' 1\"  4' 11\" 5' 1\" 5' 3\" 5' 5\" 5' 7\" 5' 9\" 5' 11\" 6' 1\"   42 - 45 2.49 2.66 2.84 3.03 3.22 3.42 3.62 3.83 42 - 45 2.82 3.03 3.26 3.49 3.72 3.96 4.22 4.47   46 - 49 2.40 2.57 2.76 2.94 3.14 3.33 3.54 3.75 46 - 49 2.70 2.92 3.14 3.37 3.61 3.85 4.10 4.36   50 - 53 2.31 2.48 2.66 2.85 3.04 3.24 3.45 3.66 50 - 53 2.58 2.80 3.02 3.25 3.49 3.73 3.98 4.24   54 - 57 2.21 2.38 2.57 2.75 2.95 3.14 3.35 3.56 54 - 57 2.46 2.67 2.89 3.12 3.36

## 2024-06-28 NOTE — PROGRESS NOTES
rounding on PCU    Assessment:  entered room to be present as Hospitalist reviewed with family. Patient was able to respond to Dr. Galvez regarding today's status. She indicated that she wished to push on with current treatment. She responds weakly but apparently appropriately. Response was not audible from my possition in the room.     Intervention: None at this time. Listening to current progress.    Plan: Chaplains are available on site or on call 24/7 for spiritual and emotional support.

## 2024-06-28 NOTE — PROGRESS NOTES
See respiratory notes. Updated Dr Miranda of urine output of 175 ml for this shift. and that 500 ml bolus was started and running over 2 hrs. Maintenance fluids slowed to KVO while bolus infusing. Lung sounds with crackles in bilat bases which has not changed. Patient does not feel short of breath. New order received for CXR. WBC elevated to 23.8. Will continue to monitor

## 2024-06-28 NOTE — PROGRESS NOTES
06/28/24 1033   Encounter Summary   Encounter Overview/Reason Spiritual/Emotional Needs;Ethics/Mediation;Follow-up   Service Provided For Patient and family together   Referral/Consult From Rounding   Support System Children;Family members   Last Encounter  06/28/24   Complexity of Encounter Moderate   Begin Time 0930   End Time  1034   Total Time Calculated 64 min   Spiritual/Emotional needs   Type Spiritual Support   Ethics/Mediation   Type Care team Ethics Conference   Assessment/Intervention/Outcome   Assessment Calm   Intervention Active listening;Prayer (assurance of)/[place];Sustaining Presence/Ministry of presence   Outcome Coping;Engaged in conversation;Expressed Gratitude

## 2024-06-28 NOTE — PROGRESS NOTES
Adult Non-Invasive Positive Pressure Ventilation for Acute Respiratory Distress  Patient & Family Education Note     Patient: Soumya Smith  Age: 89 y.o.     The patient and/or family has been educated on the following items and have verbalized understanding and agreement:    [x]Patient and/or family have been educated on the purpose and advantages of NIV and have verbalized understanding and agreement.  [x]Patient and/or family is in agreement that the patient will be NPO (Nothing by Mouth) for the duration of NIV use.  [x]Patient and/or  family is in agreement that NIV will not be routinely disrupted except to complete oral care.  [x]Patient and/or family have been educated on the level of care, vital signs frequency and monitoring requirements for NIV and are in agreement.  [x]Patient and/or family have been educated on reporting any nausea, chest discomfort, sudden increase in shortness of breath, or a severe headache to the staff immediately.

## 2024-06-28 NOTE — CARE COORDINATION
IMM letter provided to patient.  Patient offered four hours to make informed decision regarding appeal process; patient agreeable to discharge.       Copy made and placed in medical records.      Electronically signed by PORSHA Calix on 6/28/2024 at 12:14 PM

## 2024-06-28 NOTE — PROGRESS NOTES
rounding on PCU    Assessment: Alerted to possible Ethics Consult by [place] Leader, Jannet, during her time in the [facility] facility. Question regarding the suggestion by surgeon of intubation of patient following a successful surgery. Patient has now reverted to DNR status, however there is some question regarding her desire at this time. Family has been consulted and at this time intend to follow her wishes. Daughter is coming to hospital to discuss with physicians.  This  and [place] Leader met with PCU Nurse Manager and House supervisor to better understand clinical considerations. At this time Ethics Question is on hold unless needed.   visited room. Patient was sleeping soundly and did not respond when  spoke to her and prayed with a number of family members who are in the room.    Intervention: Met with those involved as indicated above.. Family expressed appreciation for visit and offer of continued prayer.    Plan: Chaplains are available on site or on call 24/7 for spiritual and emotional support.

## 2024-06-28 NOTE — PROGRESS NOTES
Hospitalist Progress Note    Patient:  Soumya Smith     YOB: 1933    MRN: [id number]   Admit date: 6/23/2024     Acct: [id number]     PCP: Jannet Wallace, NILAY - CNP    CC--Interval History: POD 4 open right hemicolectomy and sigmoidoscopy-- CINDI--6.24.2024--Ruben    WBC increasing---also noticed some lung field changes----dc Mefoxin --> Zosyn IV and given Lasix    Still with higher output CINDI - 780 mL clear laura----Uout ~ 0.5 mL-kg-hour    Discussed code issues with patient and Dr. Miranda ----patient's wants to pursue all avenues for survival and wants to be put on a ventilator if necessary---PNA??---given fluid status will start with BiPAP---12-6-12--60% to improve ventilation    Nutrition---PICC line and TPN started    Events of atrial fibrillation-----RVR ---> Lopressor 25 BID and intermittent dosing of Lopressor 5 IV---digoxin 0.25 mg IV once --> better rate control of chronic atrial fibrillation.    Patient's decisions discussed with family member and DPOA---questions answered    See note below    All other ROS negative except noted in HPI    Diet:  Diet NPO  PN-Adult Premix  4.25/10 - Standard Electrolytes - Peripheral Line    Medications:  Scheduled Meds:   sodium chloride flush  5-40 mL IntraVENous 2 times per day    pantoprazole (PROTONIX) 40 mg in sodium chloride (PF) 0.9 % 10 mL injection  40 mg IntraVENous Daily    fat emulsion  250 mL IntraVENous Daily    metoprolol tartrate  25 mg Oral BID    piperacillin-tazobactam  3,375 mg IntraVENous Q8H    sodium chloride flush  10 mL IntraVENous 2 times per day    midodrine  10 mg Oral TID WC    miconazole   Topical BID    ipratropium 0.5 mg-albuterol 2.5 mg  1 Dose Inhalation Q4H WA RT    amiodarone  200 mg Oral Daily    [Held by provider] atorvastatin  40 mg Oral Daily    [Held by provider] docusate sodium  100 mg Oral Daily    sertraline  100 mg Oral Daily    sucralfate  1 g Oral TID AC    mometasone-formoterol  2 puff Inhalation BID RT

## 2024-06-28 NOTE — FLOWSHEET NOTE
Physical Therapy     Attempted to see pt this date. Nursing requested to hold as pt's heart rate is 160 bpm. Will attempt therapy as able tomorrow 6/29/24.     Cristy Mancuso, PTA

## 2024-06-28 NOTE — PROGRESS NOTES
RT called to patients room for low oxygen saturations. Patient on 4L nasal cannula upon RT entering, SpO2 86%.  Patient getting fluids and mouth-breathing.  Attempted to instruct patient to breath through nose, but patient not able to follow commands and just states ok.  Placed patient on simple mask starting at 8-10L, SpO2 88%. Increased to 13L, SpO2 88-90%. Placed patient on NRB mask due to not staying consistently at 90% on simple mask.  While on NRB mask at 10L, patient SpO2 increased to 93%.  Patient states her breathing feels normal, she don't feel short of breath, RR 16. RN notified.

## 2024-06-28 NOTE — PROGRESS NOTES
General Surgery - Neri Paulino PA-C  Daily Progress Note  Pt Name: Soumya Smith  Medical Record Number: [id number]  Date of Birth 3/14/1933   Today's Date: 6/28/2024      SUBJECTIVE  Postop day 4 open right hemicolectomy  Wakes up easily for me, states pain is still present  Has increase need of O2 overnight, XRAY shows worsening airspace disease  Still sig edema in abdomen and legs/anasarca  CINDI drain shows serosanguineous fluid, is getting a significant amount out      OBJECTIVE  CURRENT VITALS /60   Pulse (!) 109   Temp 99.1 °F (37.3 °C) (Temporal)   Resp 18   Ht 1.626 m (5' 4\")   Wt 97.7 kg (215 lb 4.8 oz)   SpO2 97%   BMI 36.96 kg/m²   GENERAL: alert, cooperative, no distress  LUNGS: on nonrebreather, appears to be breathing alittle heavier as we talk but was comfortable prior to our conversation  HEART: tachycardic, regular rhythm, and intact distal pulses  ABDOMEN: soft, midline incision has no dehiscence, no surrounding erythema/warmth/tenderness, BS are present but hypoactive, pitting edema of the lower abdomen  EXTERMITY: 3+edema  24 HR INTAKE/OUTPUT :   Intake/Output Summary (Last 24 hours) at 6/28/2024 0947  Last data filed at 6/28/2024 0739  Gross per 24 hour   Intake 3834.58 ml   Output 1875 ml   Net 1959.58 ml     DRAIN/TUBE OUTPUT :  ,    Current Facility-Administered Medications   Medication Dose Route Frequency Provider Last Rate Last Admin    furosemide (LASIX) injection 40 mg  40 mg IntraVENous Once Lamont Galvez MD        sodium chloride flush 0.9 % injection 5-40 mL  5-40 mL IntraVENous 2 times per day Lamont Galvez MD        sodium chloride flush 0.9 % injection 5-40 mL  5-40 mL IntraVENous PRN Lamont Galvez MD        0.9 % sodium chloride infusion   IntraVENous PRN Lamont Galvez MD        metoprolol (LOPRESSOR) injection 2.5 mg  2.5 mg IntraVENous Q6H PRN Hannah Jean Baptiste APRN - NP        sodium chloride flush 0.9 % injection 10 mL  10 mL IntraVENous 2 times per day  Hannah Jean Baptiste APRN - NP   10 mL at 06/27/24 2130    sodium chloride flush 0.9 % injection 10 mL  10 mL IntraVENous PRN Hannah Jean Baptiste APRN - NP        0.9 % sodium chloride infusion   IntraVENous PRN Hannah Jean Baptiste APRN - NP   Stopped at 06/25/24 0159    potassium chloride (KLOR-CON M) extended release tablet 40 mEq  40 mEq Oral PRN Hannah Jean Baptiste APRN - NP        Or    potassium bicarb-citric acid (EFFER-K) effervescent tablet 40 mEq  40 mEq Oral PRN Hannah Jean Baptiste APRN - NP        Or    potassium chloride 10 mEq/100 mL IVPB (Peripheral Line)  10 mEq IntraVENous PRN Hannah Jean Baptiste APRN - NP        magnesium sulfate 2000 mg in 50 mL IVPB premix  2,000 mg IntraVENous PRN Hannah Jean Baptiste APRN - NP        ondansetron (ZOFRAN-ODT) disintegrating tablet 4 mg  4 mg Oral Q8H PRN Hannah Jean Baptiste APRN - NP   4 mg at 06/25/24 0959    Or    ondansetron (ZOFRAN) injection 4 mg  4 mg IntraVENous Q6H PRN Hannah Jean Baptiste APRN - NP        dextrose 5 % and 0.45 % sodium chloride infusion   IntraVENous Continuous Hannah Jean Baptiste APRN - NP   Stopped at 06/28/24 0514    cefOXitin (MEFOXIN) 2,000 mg in sodium chloride 0.9 % 50 mL IVPB (mini-bag)  2,000 mg IntraVENous Q8H Hannah Jean Baptiste APRN - NP   Stopped at 06/28/24 0545    oxyCODONE (ROXICODONE) immediate release tablet 5 mg  5 mg Oral Q4H PRN Hannah Jean Baptiste APRN - NP   5 mg at 06/26/24 1932    Or    oxyCODONE (ROXICODONE) immediate release tablet 10 mg  10 mg Oral Q4H PRN Hannah Jean Baptiste APRN - NP   10 mg at 06/27/24 0526    HYDROmorphone (DILAUDID) injection 0.25 mg  0.25 mg IntraVENous Q3H PRN Hannah Jean Baptiste APRN - NP        midodrine (PROAMATINE) tablet 10 mg  10 mg Oral TID WC Hannah Jean Baptiste APRN - NP   10 mg at 06/27/24 0938    miconazole (MICOTIN) 2 % powder   Topical BID Hannah Jean Baptiste APRN - NP   Given at 06/27/24 2129    sodium chloride nebulizer 0.9 % solution 3 mL  3 mL Nebulization As Directed RT PRN Hannah Jean Baptiste APRN - NP        albuterol (PROVENTIL) (2.5 MG/3ML) 0.083%

## 2024-06-28 NOTE — CARE COORDINATION
ION contacted [facility] regarding referral. ION spoke with admissions Stephanie and informed patient accepted. Stephanie states she will contact back if precert is needed. ION reiterated she has traditional medicare and Medicaid.         Electronically signed by PORSHA Calix on 6/28/2024 at 8:36 AM

## 2024-06-28 NOTE — ANESTHESIA PROCEDURE NOTES
PICC/Midline:    A PICC line  was placed using Seldinger and ultrasound guided in the patient floor for the following indication(s): intravenous access .  6/28/2024 1:00 PM      Staffing  Performed: Resident/CRNA   Resident/CRNA: Roman Grimes APRN - CRNA  Performed by: Roman Grimes APRN - CRNA  Authorized by: Roman Grimes APRN - CRNA  Sterility preparation included the following: hand hygiene performed prior to procedure, maximum sterile barriers used and sterile technique used to drape from head to toe..  The  right,  basilic vein was prepped.A 4 Fr (size), Chloraprep}lidocaine 1%, double lumen   Advanced 34 cm., Exposed 3 cm., Total 37 cm.        Patient verbalized understanding of education.chest x-ray  Preanesthetic Checklist  Completed: patient identified, IV checked, site marked, risks and benefits discussed, surgical/procedural consents, equipment checked, pre-op evaluation, timeout performed, anesthesia consent given, oxygen available, monitors applied/VS acknowledged, fire risk safety assessment completed and verbalized and blood product R/B/A discussed and consented

## 2024-06-29 ENCOUNTER — APPOINTMENT (OUTPATIENT)
Dept: GENERAL RADIOLOGY | Age: 89
End: 2024-06-29
Attending: FAMILY MEDICINE
Payer: MEDICARE

## 2024-06-29 LAB
ALBUMIN SERPL-MCNC: 2.3 G/DL (ref 3.5–5.2)
ALBUMIN/GLOB SERPL: 1.4 {RATIO} (ref 1–2.5)
ALP SERPL-CCNC: 142 U/L (ref 35–104)
ALT SERPL-CCNC: 19 U/L (ref 5–33)
ANION GAP SERPL CALCULATED.3IONS-SCNC: 10 MMOL/L (ref 9–17)
ANION GAP SERPL CALCULATED.3IONS-SCNC: 11 MMOL/L (ref 9–17)
ANION GAP SERPL CALCULATED.3IONS-SCNC: 12 MMOL/L (ref 9–17)
AST SERPL-CCNC: 64 U/L
BASOPHILS # BLD: 0.03 K/UL (ref 0–0.2)
BASOPHILS NFR BLD: 0 % (ref 0–2)
BILIRUB SERPL-MCNC: 0.5 MG/DL (ref 0.3–1.2)
BNP SERPL-MCNC: ABNORMAL PG/ML
BUN SERPL-MCNC: 21 MG/DL (ref 8–23)
BUN SERPL-MCNC: 24 MG/DL (ref 8–23)
BUN SERPL-MCNC: 25 MG/DL (ref 8–23)
BUN/CREAT SERPL: 14 (ref 9–20)
BUN/CREAT SERPL: 15 (ref 9–20)
BUN/CREAT SERPL: 16 (ref 9–20)
CALCIUM SERPL-MCNC: 7.2 MG/DL (ref 8.6–10.4)
CALCIUM SERPL-MCNC: 7.2 MG/DL (ref 8.6–10.4)
CALCIUM SERPL-MCNC: 7.6 MG/DL (ref 8.6–10.4)
CHLORIDE SERPL-SCNC: 104 MMOL/L (ref 98–107)
CHLORIDE SERPL-SCNC: 104 MMOL/L (ref 98–107)
CHLORIDE SERPL-SCNC: 106 MMOL/L (ref 98–107)
CO2 SERPL-SCNC: 16 MMOL/L (ref 20–31)
CO2 SERPL-SCNC: 17 MMOL/L (ref 20–31)
CO2 SERPL-SCNC: 17 MMOL/L (ref 20–31)
CREAT SERPL-MCNC: 1.5 MG/DL (ref 0.5–0.9)
CREAT SERPL-MCNC: 1.6 MG/DL (ref 0.5–0.9)
CREAT SERPL-MCNC: 1.6 MG/DL (ref 0.5–0.9)
EOSINOPHIL # BLD: 0.13 K/UL (ref 0–0.44)
EOSINOPHIL # BLD: 0.22 K/UL (ref 0–0.44)
EOSINOPHIL # BLD: 0.23 K/UL (ref 0–0.44)
EOSINOPHILS RELATIVE PERCENT: 1 % (ref 1–4)
ERYTHROCYTE [DISTWIDTH] IN BLOOD BY AUTOMATED COUNT: 15.3 % (ref 11.8–14.4)
ERYTHROCYTE [DISTWIDTH] IN BLOOD BY AUTOMATED COUNT: 15.3 % (ref 11.8–14.4)
ERYTHROCYTE [DISTWIDTH] IN BLOOD BY AUTOMATED COUNT: 15.5 % (ref 11.8–14.4)
GFR, ESTIMATED: 30 ML/MIN/1.73M2
GFR, ESTIMATED: 30 ML/MIN/1.73M2
GFR, ESTIMATED: 33 ML/MIN/1.73M2
GLUCOSE BLD-MCNC: 162 MG/DL (ref 65–105)
GLUCOSE BLD-MCNC: 162 MG/DL (ref 65–105)
GLUCOSE SERPL-MCNC: 134 MG/DL (ref 70–99)
GLUCOSE SERPL-MCNC: 151 MG/DL (ref 70–99)
GLUCOSE SERPL-MCNC: 158 MG/DL (ref 70–99)
HCT VFR BLD AUTO: 25.4 % (ref 36.3–47.1)
HCT VFR BLD AUTO: 26.4 % (ref 36.3–47.1)
HCT VFR BLD AUTO: 27 % (ref 36.3–47.1)
HGB BLD-MCNC: 8 G/DL (ref 11.9–15.1)
HGB BLD-MCNC: 8.1 G/DL (ref 11.9–15.1)
HGB BLD-MCNC: 8.6 G/DL (ref 11.9–15.1)
IMM GRANULOCYTES # BLD AUTO: 0.11 K/UL (ref 0–0.3)
IMM GRANULOCYTES # BLD AUTO: 0.12 K/UL (ref 0–0.3)
IMM GRANULOCYTES # BLD AUTO: 0.12 K/UL (ref 0–0.3)
IMM GRANULOCYTES NFR BLD: 1 %
LYMPHOCYTES NFR BLD: 1.22 K/UL (ref 1.1–3.7)
LYMPHOCYTES NFR BLD: 1.53 K/UL (ref 1.1–3.7)
LYMPHOCYTES NFR BLD: 1.76 K/UL (ref 1.1–3.7)
LYMPHOCYTES RELATIVE PERCENT: 7 % (ref 24–43)
LYMPHOCYTES RELATIVE PERCENT: 8 % (ref 24–43)
LYMPHOCYTES RELATIVE PERCENT: 9 % (ref 24–43)
MCH RBC QN AUTO: 27.5 PG (ref 25.2–33.5)
MCH RBC QN AUTO: 27.6 PG (ref 25.2–33.5)
MCH RBC QN AUTO: 28 PG (ref 25.2–33.5)
MCHC RBC AUTO-ENTMCNC: 30.7 G/DL (ref 25.2–33.5)
MCHC RBC AUTO-ENTMCNC: 31.5 G/DL (ref 25.2–33.5)
MCHC RBC AUTO-ENTMCNC: 31.9 G/DL (ref 25.2–33.5)
MCV RBC AUTO: 87.3 FL (ref 82.6–102.9)
MCV RBC AUTO: 87.9 FL (ref 82.6–102.9)
MCV RBC AUTO: 90.1 FL (ref 82.6–102.9)
MONOCYTES NFR BLD: 0.81 K/UL (ref 0.1–1.2)
MONOCYTES NFR BLD: 1 K/UL (ref 0.1–1.2)
MONOCYTES NFR BLD: 1.03 K/UL (ref 0.1–1.2)
MONOCYTES NFR BLD: 4 % (ref 3–12)
MONOCYTES NFR BLD: 5 % (ref 3–12)
MONOCYTES NFR BLD: 6 % (ref 3–12)
NEGATIVE BASE EXCESS, ART: 7.9 MMOL/L (ref 0–2)
NEGATIVE BASE EXCESS, ART: 9 MMOL/L (ref 0–2)
NEGATIVE BASE EXCESS, ART: 9.7 MMOL/L (ref 0–2)
NEUTROPHILS NFR BLD: 84 % (ref 36–65)
NEUTROPHILS NFR BLD: 84 % (ref 36–65)
NEUTROPHILS NFR BLD: 87 % (ref 36–65)
NEUTS SEG NFR BLD: 15.88 K/UL (ref 1.5–8.1)
NEUTS SEG NFR BLD: 15.96 K/UL (ref 1.5–8.1)
NEUTS SEG NFR BLD: 16.28 K/UL (ref 1.5–8.1)
NRBC BLD-RTO: 0 PER 100 WBC
PLATELET # BLD AUTO: 273 K/UL (ref 138–453)
PLATELET # BLD AUTO: 284 K/UL (ref 138–453)
PLATELET # BLD AUTO: 286 K/UL (ref 138–453)
PMV BLD AUTO: 9.6 FL (ref 8.1–13.5)
PMV BLD AUTO: 9.7 FL (ref 8.1–13.5)
PMV BLD AUTO: 9.8 FL (ref 8.1–13.5)
POC HCO3: 15.7 MMOL/L (ref 21–28)
POC HCO3: 16.7 MMOL/L (ref 21–28)
POC HCO3: 17.2 MMOL/L (ref 21–28)
POC O2 SATURATION: 92.5 % (ref 94–98)
POC O2 SATURATION: 93.2 % (ref 94–98)
POC O2 SATURATION: 95.4 % (ref 94–98)
POC PCO2: 31.9 MM HG (ref 35–48)
POC PCO2: 32.4 MM HG (ref 35–48)
POC PCO2: 34.6 MM HG (ref 35–48)
POC PH: 7.29 (ref 7.35–7.45)
POC PH: 7.3 (ref 7.35–7.45)
POC PH: 7.33 (ref 7.35–7.45)
POC PO2: 71.1 MM HG (ref 83–108)
POC PO2: 74.4 MM HG (ref 83–108)
POC PO2: 82.3 MM HG (ref 83–108)
POTASSIUM SERPL-SCNC: 3.4 MMOL/L (ref 3.7–5.3)
POTASSIUM SERPL-SCNC: 3.6 MMOL/L (ref 3.7–5.3)
POTASSIUM SERPL-SCNC: 3.7 MMOL/L (ref 3.7–5.3)
PROT SERPL-MCNC: 4 G/DL (ref 6.4–8.3)
RBC # BLD AUTO: 2.91 M/UL (ref 3.95–5.11)
RBC # BLD AUTO: 2.93 M/UL (ref 3.95–5.11)
RBC # BLD AUTO: 3.07 M/UL (ref 3.95–5.11)
RBC # BLD: ABNORMAL 10*6/UL
SODIUM SERPL-SCNC: 131 MMOL/L (ref 135–144)
SODIUM SERPL-SCNC: 132 MMOL/L (ref 135–144)
SODIUM SERPL-SCNC: 134 MMOL/L (ref 135–144)
WBC OTHER # BLD: 18.4 K/UL (ref 3.5–11.3)
WBC OTHER # BLD: 18.8 K/UL (ref 3.5–11.3)
WBC OTHER # BLD: 19.3 K/UL (ref 3.5–11.3)

## 2024-06-29 PROCEDURE — 6360000002 HC RX W HCPCS

## 2024-06-29 PROCEDURE — 2500000003 HC RX 250 WO HCPCS: Performed by: SURGERY

## 2024-06-29 PROCEDURE — 6370000000 HC RX 637 (ALT 250 FOR IP)

## 2024-06-29 PROCEDURE — 2700000000 HC OXYGEN THERAPY PER DAY

## 2024-06-29 PROCEDURE — 82947 ASSAY GLUCOSE BLOOD QUANT: CPT

## 2024-06-29 PROCEDURE — 2580000003 HC RX 258

## 2024-06-29 PROCEDURE — 85025 COMPLETE CBC W/AUTO DIFF WBC: CPT

## 2024-06-29 PROCEDURE — 71045 X-RAY EXAM CHEST 1 VIEW: CPT

## 2024-06-29 PROCEDURE — 86901 BLOOD TYPING SEROLOGIC RH(D): CPT

## 2024-06-29 PROCEDURE — 2580000003 HC RX 258: Performed by: SURGERY

## 2024-06-29 PROCEDURE — 2500000003 HC RX 250 WO HCPCS: Performed by: INTERNAL MEDICINE

## 2024-06-29 PROCEDURE — 82803 BLOOD GASES ANY COMBINATION: CPT

## 2024-06-29 PROCEDURE — 2580000003 HC RX 258: Performed by: INTERNAL MEDICINE

## 2024-06-29 PROCEDURE — 94660 CPAP INITIATION&MGMT: CPT

## 2024-06-29 PROCEDURE — 2580000003 HC RX 258: Performed by: FAMILY MEDICINE

## 2024-06-29 PROCEDURE — 36600 WITHDRAWAL OF ARTERIAL BLOOD: CPT

## 2024-06-29 PROCEDURE — 83880 ASSAY OF NATRIURETIC PEPTIDE: CPT

## 2024-06-29 PROCEDURE — 86920 COMPATIBILITY TEST SPIN: CPT

## 2024-06-29 PROCEDURE — 99024 POSTOP FOLLOW-UP VISIT: CPT | Performed by: PHYSICIAN ASSISTANT

## 2024-06-29 PROCEDURE — 6360000002 HC RX W HCPCS: Performed by: NURSE PRACTITIONER

## 2024-06-29 PROCEDURE — P9047 ALBUMIN (HUMAN), 25%, 50ML: HCPCS | Performed by: NURSE PRACTITIONER

## 2024-06-29 PROCEDURE — 2060000000 HC ICU INTERMEDIATE R&B

## 2024-06-29 PROCEDURE — 2000000000 HC ICU R&B

## 2024-06-29 PROCEDURE — C9113 INJ PANTOPRAZOLE SODIUM, VIA: HCPCS | Performed by: INTERNAL MEDICINE

## 2024-06-29 PROCEDURE — 86900 BLOOD TYPING SEROLOGIC ABO: CPT

## 2024-06-29 PROCEDURE — 80053 COMPREHEN METABOLIC PANEL: CPT

## 2024-06-29 PROCEDURE — 94761 N-INVAS EAR/PLS OXIMETRY MLT: CPT

## 2024-06-29 PROCEDURE — 94640 AIRWAY INHALATION TREATMENT: CPT

## 2024-06-29 PROCEDURE — 86850 RBC ANTIBODY SCREEN: CPT

## 2024-06-29 PROCEDURE — 6360000002 HC RX W HCPCS: Performed by: INTERNAL MEDICINE

## 2024-06-29 PROCEDURE — 80048 BASIC METABOLIC PNL TOTAL CA: CPT

## 2024-06-29 PROCEDURE — 36415 COLL VENOUS BLD VENIPUNCTURE: CPT

## 2024-06-29 PROCEDURE — 6360000002 HC RX W HCPCS: Performed by: FAMILY MEDICINE

## 2024-06-29 RX ORDER — POTASSIUM CHLORIDE 7.45 MG/ML
10 INJECTION INTRAVENOUS
Status: COMPLETED | OUTPATIENT
Start: 2024-06-29 | End: 2024-06-30

## 2024-06-29 RX ORDER — POTASSIUM CHLORIDE 7.45 MG/ML
10 INJECTION INTRAVENOUS
Status: COMPLETED | OUTPATIENT
Start: 2024-06-29 | End: 2024-06-29

## 2024-06-29 RX ORDER — FUROSEMIDE 10 MG/ML
20 INJECTION INTRAMUSCULAR; INTRAVENOUS ONCE
Status: DISCONTINUED | OUTPATIENT
Start: 2024-06-29 | End: 2024-06-29

## 2024-06-29 RX ORDER — 0.9 % SODIUM CHLORIDE 0.9 %
500 INTRAVENOUS SOLUTION INTRAVENOUS ONCE
Status: COMPLETED | OUTPATIENT
Start: 2024-06-29 | End: 2024-06-29

## 2024-06-29 RX ORDER — ALBUMIN (HUMAN) 12.5 G/50ML
50 SOLUTION INTRAVENOUS ONCE
Status: COMPLETED | OUTPATIENT
Start: 2024-06-29 | End: 2024-06-29

## 2024-06-29 RX ORDER — 0.9 % SODIUM CHLORIDE 0.9 %
500 INTRAVENOUS SOLUTION INTRAVENOUS ONCE
Status: COMPLETED | OUTPATIENT
Start: 2024-06-29 | End: 2024-06-30

## 2024-06-29 RX ORDER — ENOXAPARIN SODIUM 100 MG/ML
30 INJECTION SUBCUTANEOUS DAILY
Status: DISCONTINUED | OUTPATIENT
Start: 2024-06-29 | End: 2024-07-04 | Stop reason: HOSPADM

## 2024-06-29 RX ORDER — FUROSEMIDE 10 MG/ML
20 INJECTION INTRAMUSCULAR; INTRAVENOUS ONCE
Status: COMPLETED | OUTPATIENT
Start: 2024-06-30 | End: 2024-06-30

## 2024-06-29 RX ORDER — FUROSEMIDE 10 MG/ML
20 INJECTION INTRAMUSCULAR; INTRAVENOUS ONCE
Status: COMPLETED | OUTPATIENT
Start: 2024-06-29 | End: 2024-06-29

## 2024-06-29 RX ORDER — SODIUM CHLORIDE AND POTASSIUM CHLORIDE 150; 900 MG/100ML; MG/100ML
INJECTION, SOLUTION INTRAVENOUS CONTINUOUS
Status: DISCONTINUED | OUTPATIENT
Start: 2024-06-29 | End: 2024-07-04

## 2024-06-29 RX ADMIN — SODIUM BICARBONATE: 84 INJECTION INTRAVENOUS at 19:22

## 2024-06-29 RX ADMIN — ASCORBIC ACID, VITAMIN A PALMITATE, CHOLECALCIFEROL, THIAMINE HYDROCHLORIDE, RIBOFLAVIN-5 PHOSPHATE SODIUM, PYRIDOXINE HYDROCHLORIDE, NIACINAMIDE, DEXPANTHENOL, ALPHA-TOCOPHEROL ACETATE, VITAMIN K1, FOLIC ACID, BIOTIN, CYANOCOBALAMIN: 200; 3300; 200; 6; 3.6; 6; 40; 15; 10; 150; 600; 60; 5 INJECTION, SOLUTION INTRAVENOUS at 19:04

## 2024-06-29 RX ADMIN — IPRATROPIUM BROMIDE AND ALBUTEROL SULFATE 1 DOSE: 2.5; .5 SOLUTION RESPIRATORY (INHALATION) at 08:41

## 2024-06-29 RX ADMIN — MICONAZOLE NITRATE: 20 POWDER TOPICAL at 22:12

## 2024-06-29 RX ADMIN — PIPERACILLIN AND TAZOBACTAM 3375 MG: 3; .375 INJECTION, POWDER, LYOPHILIZED, FOR SOLUTION INTRAVENOUS at 01:13

## 2024-06-29 RX ADMIN — SODIUM CHLORIDE, PRESERVATIVE FREE 10 ML: 5 INJECTION INTRAVENOUS at 09:32

## 2024-06-29 RX ADMIN — I.V. FAT EMULSION 250 ML: 20 EMULSION INTRAVENOUS at 19:12

## 2024-06-29 RX ADMIN — POTASSIUM CHLORIDE 10 MEQ: 7.46 INJECTION, SOLUTION INTRAVENOUS at 23:26

## 2024-06-29 RX ADMIN — SODIUM CHLORIDE 500 ML: 9 INJECTION, SOLUTION INTRAVENOUS at 23:25

## 2024-06-29 RX ADMIN — POTASSIUM CHLORIDE 10 MEQ: 7.46 INJECTION, SOLUTION INTRAVENOUS at 07:00

## 2024-06-29 RX ADMIN — PIPERACILLIN AND TAZOBACTAM 3375 MG: 3; .375 INJECTION, POWDER, LYOPHILIZED, FOR SOLUTION INTRAVENOUS at 18:09

## 2024-06-29 RX ADMIN — MICONAZOLE NITRATE: 20 POWDER TOPICAL at 08:30

## 2024-06-29 RX ADMIN — OXYCODONE HYDROCHLORIDE 5 MG: 5 TABLET ORAL at 05:46

## 2024-06-29 RX ADMIN — IPRATROPIUM BROMIDE AND ALBUTEROL SULFATE 1 DOSE: 2.5; .5 SOLUTION RESPIRATORY (INHALATION) at 12:20

## 2024-06-29 RX ADMIN — SODIUM CHLORIDE, PRESERVATIVE FREE 10 ML: 5 INJECTION INTRAVENOUS at 22:12

## 2024-06-29 RX ADMIN — HYDROMORPHONE HYDROCHLORIDE 0.25 MG: 1 INJECTION, SOLUTION INTRAMUSCULAR; INTRAVENOUS; SUBCUTANEOUS at 15:12

## 2024-06-29 RX ADMIN — DEXTROSE AND SODIUM CHLORIDE: 5; 450 INJECTION, SOLUTION INTRAVENOUS at 16:55

## 2024-06-29 RX ADMIN — POTASSIUM CHLORIDE AND SODIUM CHLORIDE: 900; 150 INJECTION, SOLUTION INTRAVENOUS at 20:13

## 2024-06-29 RX ADMIN — ALBUMIN (HUMAN) 50 G: 0.25 INJECTION, SOLUTION INTRAVENOUS at 20:30

## 2024-06-29 RX ADMIN — SODIUM CHLORIDE, PRESERVATIVE FREE 10 ML: 5 INJECTION INTRAVENOUS at 22:15

## 2024-06-29 RX ADMIN — PIPERACILLIN AND TAZOBACTAM 3375 MG: 3; .375 INJECTION, POWDER, LYOPHILIZED, FOR SOLUTION INTRAVENOUS at 09:13

## 2024-06-29 RX ADMIN — POTASSIUM CHLORIDE 10 MEQ: 7.46 INJECTION, SOLUTION INTRAVENOUS at 09:07

## 2024-06-29 RX ADMIN — POTASSIUM CHLORIDE 10 MEQ: 7.46 INJECTION, SOLUTION INTRAVENOUS at 07:55

## 2024-06-29 RX ADMIN — ENOXAPARIN SODIUM 30 MG: 100 INJECTION SUBCUTANEOUS at 14:13

## 2024-06-29 RX ADMIN — FUROSEMIDE 20 MG: 10 INJECTION, SOLUTION INTRAMUSCULAR; INTRAVENOUS at 15:00

## 2024-06-29 RX ADMIN — SODIUM CHLORIDE, PRESERVATIVE FREE 40 MG: 5 INJECTION INTRAVENOUS at 09:03

## 2024-06-29 RX ADMIN — DEXTROSE AND SODIUM CHLORIDE: 5; 450 INJECTION, SOLUTION INTRAVENOUS at 02:58

## 2024-06-29 RX ADMIN — IPRATROPIUM BROMIDE AND ALBUTEROL SULFATE 1 DOSE: 2.5; .5 SOLUTION RESPIRATORY (INHALATION) at 19:15

## 2024-06-29 RX ADMIN — SUCRALFATE 1 G: 1 TABLET ORAL at 05:43

## 2024-06-29 RX ADMIN — SODIUM CHLORIDE 500 ML: 9 INJECTION, SOLUTION INTRAVENOUS at 18:26

## 2024-06-29 RX ADMIN — IPRATROPIUM BROMIDE AND ALBUTEROL SULFATE 1 DOSE: 2.5; .5 SOLUTION RESPIRATORY (INHALATION) at 16:18

## 2024-06-29 ASSESSMENT — PAIN SCALES - WONG BAKER
WONGBAKER_NUMERICALRESPONSE: NO HURT
WONGBAKER_NUMERICALRESPONSE: HURTS LITTLE MORE
WONGBAKER_NUMERICALRESPONSE: NO HURT
WONGBAKER_NUMERICALRESPONSE: HURTS A LITTLE BIT
WONGBAKER_NUMERICALRESPONSE: NO HURT
WONGBAKER_NUMERICALRESPONSE: HURTS A LITTLE BIT

## 2024-06-29 ASSESSMENT — PAIN DESCRIPTION - ORIENTATION
ORIENTATION: RIGHT;MID
ORIENTATION: LOWER

## 2024-06-29 ASSESSMENT — PAIN SCALES - GENERAL
PAINLEVEL_OUTOF10: 5
PAINLEVEL_OUTOF10: 0
PAINLEVEL_OUTOF10: 4

## 2024-06-29 ASSESSMENT — PAIN DESCRIPTION - LOCATION
LOCATION: ABDOMEN
LOCATION: ABDOMEN

## 2024-06-29 ASSESSMENT — PAIN - FUNCTIONAL ASSESSMENT: PAIN_FUNCTIONAL_ASSESSMENT: PREVENTS OR INTERFERES SOME ACTIVE ACTIVITIES AND ADLS

## 2024-06-29 ASSESSMENT — PAIN DESCRIPTION - DESCRIPTORS
DESCRIPTORS: PRESSURE
DESCRIPTORS: PATIENT UNABLE TO DESCRIBE

## 2024-06-29 NOTE — PROGRESS NOTES
Clinical Summary POD#4  Tolerating bipap well. VBG stable. On Zosyn for possible pneumonia with increasing leukocytosis. Has PICC line for TPN and lipids. CINDI continues to have high output (surgery aware). Is off Levophed. Nursing reports increased generalized edema (third spacing/anasarca) along with continued low urine output -- patient did not tolerate IV fluid bolus well this morning and her blood pressures have been around 100 systolic today. Serum albumin 1.9. 50G IV albumin ordered. Is DNR-CCA -- patient and family are okay with intubation if becomes necessary if she does not improve on bipap. Per report she had 2 episodes of RVR today so beta blocker was increased and she received one dose of IV digoxin. General surgery to clarify if we can resume her Eliquis or full dose lovenox or DVT prophylaxis dose lovenox. Has SCDs in place.

## 2024-06-29 NOTE — PROGRESS NOTES
General Surgery - Neri Paulino PA-C  Daily Progress Note  Pt Name: Soumya Smith  Medical Record Number: [id number]  Date of Birth 3/14/1933   Today's Date: 6/29/2024      SUBJECTIVE  Postop day 5 open right hemicolectomy  A little more tired today but still arousable, still having pain  Tolerating bipap, ABG boarderline but no intubation yet,   Still sig edema in abdomen and legs/anasarca  CINDI drain shows serosanguineous fluid, is getting a significant amount out  WBC and Hgb dropped some likely from the fluids  Having bms    OBJECTIVE  CURRENT VITALS /63   Pulse 82   Temp 98.2 °F (36.8 °C) (Temporal)   Resp 17   Ht 1.626 m (5' 4\")   Wt 98.4 kg (217 lb)   SpO2 97%   BMI 37.25 kg/m²   GENERAL: alert, cooperative, no distress  LUNGS: on nonrebreather, appears to be breathing alittle heavier as we talk but was comfortable prior to our conversation  HEART:  irregular irregular, and intact distal pulses  ABDOMEN: soft, midline incision has no dehiscence, no surrounding erythema/warmth/tenderness, BS are present but hypoactive, pitting edema of the lower abdomen  EXTERMITY: 3+edema  24 HR INTAKE/OUTPUT :   Intake/Output Summary (Last 24 hours) at 6/29/2024 1546  Last data filed at 6/29/2024 1526  Gross per 24 hour   Intake 3433.68 ml   Output 2372 ml   Net 1061.68 ml     DRAIN/TUBE OUTPUT :  ,    Current Facility-Administered Medications   Medication Dose Route Frequency Provider Last Rate Last Admin    enoxaparin Sodium (LOVENOX) injection 30 mg  30 mg SubCUTAneous Daily Lester Sharma MD   30 mg at 06/29/24 1413    PN-Adult Premix  4.25/10 - Standard Electrolytes - Peripheral Line   IntraVENous Continuous TPN Lamont Galvez MD        sodium chloride flush 0.9 % injection 5-40 mL  5-40 mL IntraVENous 2 times per day Lamont Galvez MD   10 mL at 06/29/24 0932    sodium chloride flush 0.9 % injection 5-40 mL  5-40 mL IntraVENous PRN Lamont Galvez MD        0.9 % sodium chloride infusion

## 2024-06-29 NOTE — PROGRESS NOTES
collapse     Vitamin D deficiency     Weakness     BRBPR (bright red blood per rectum)     Colon cancer (HCC)     S/P right hemicolectomy     Abdominal mass, unspecified abdominal location      Lester Sharma MD, MD  Rounding Hospitalist

## 2024-06-29 NOTE — PROGRESS NOTES
Came in to see pt after her 5 pm labs.    S:  Pt on bipap, resting comfortable  Does awaken, and alert and answered question appropriately.  Some pain but better    O:  BP (!) 97/52   Pulse 87   Temp 98.2 °F (36.8 °C) (Temporal)   Resp 17   Ht 1.626 m (5' 4\")   Wt 98.4 kg (217 lb)   SpO2 94%   BMI 37.25 kg/m²     General anasarca  Lungs- good breath sounds, some crackles  CV- RRR  Abd- soft, good BS, incision clean,  CINDI serous, some incisional tenderness no R or G , not distended    5 pm labs:    Wbc=19.3  HB= 8.6  Plt= 284    BUN= 24  Cr= 1.6      ABG:  ph - 7.29,  pCO2- 34, pO2- 74 on 30% bipap, HCO3- 16,  neg base excess- 9  , O sat - 93%    Has had several loose stools    IMP:  Clearly condition is guarded.  She appears to be doing OK from the surgery and bowel standpoint.  I do not think there is any intraabdominal process going on at this time.  Exam fairly benign, good bs and bowel function starting to work.    The biggest issue at this point is nutritional , hypoproteinemia and third spacing and keeping the kidney function good.    So far cardiac and pulmonary doing OK.    The edema is putting a stress on the lungs and she is requiring bipap.    Despite the large general anasarca, I feel her intervascular volume is still depleted and needs more volume.  This will lead to more edema and more stress on lungs.  We have had conversation with pt and family , as Dr Galvez asked yesterday specifically, \"if we need to to intubate you due to edema in order to ventilate you , do you want us to do  that?\"  The pt said yes.  And family agreed to what pt wanted.    I think she is acidotic because of low intervascular volume and decreased perfusion which is supported by low UO and bump up of the Cr.  We need to give her colloid and protein with TPN and more fluid .  This may make resp tenuous and may require short term of intubation .  48 hours.  But for now doing ok on bipap.  Will also give some HCO3 to help with  acidosis.  Will continue to follow closely,     Discussed with son Oj today who is POA.  He is in agreement with above.

## 2024-06-29 NOTE — PROGRESS NOTES
Dr Sharma updated on pt urine output of 119ml for this shift after fluids were increased earlier today. Received order for Lasix 20mg IV once.

## 2024-06-29 NOTE — PLAN OF CARE
Problem: Musculoskeletal - Adult  Goal: Return mobility to safest level of function  Outcome: Not Progressing  Flowsheets (Taken 6/28/2024 2000)  Return Mobility to Safest Level of Function: Assess patient stability and activity tolerance for standing, transferring and ambulating with or without assistive devices  Goal: Return ADL status to a safe level of function  Outcome: Not Progressing  Flowsheets (Taken 6/28/2024 2000)  Return ADL Status to a Safe Level of Function: Administer medication as ordered     Problem: Musculoskeletal - Adult  Goal: Return mobility to safest level of function  Outcome: Not Progressing  Flowsheets (Taken 6/28/2024 2000)  Return Mobility to Safest Level of Function: Assess patient stability and activity tolerance for standing, transferring and ambulating with or without assistive devices  Goal: Return ADL status to a safe level of function  Outcome: Not Progressing  Flowsheets (Taken 6/28/2024 2000)  Return ADL Status to a Safe Level of Function: Administer medication as ordered     Problem: Nutrition Deficit:  Goal: Optimize nutritional status  6/28/2024 2021 by Mansi Luna RN  Outcome: Progressing  6/28/2024 1041 by Niall Lee, RD  Outcome: Not Progressing  Flowsheets (Taken 6/28/2024 1041)  Nutrient intake appropriate for improving, restoring, or maintaining nutritional needs:   Assess nutritional status and recommend course of action   Recommend, monitor, and adjust tube feedings and TPN/PPN based on assessed needs   Monitor oral intake, labs, and treatment plans

## 2024-06-29 NOTE — PROGRESS NOTES
Physical Therapy  Attempted to see patient this date. Advised to hold therapy this date per nursing. Will attempt tomorrow.       Rodolfo Graham PTA

## 2024-06-30 ENCOUNTER — APPOINTMENT (OUTPATIENT)
Dept: GENERAL RADIOLOGY | Age: 89
End: 2024-06-30
Attending: FAMILY MEDICINE
Payer: MEDICARE

## 2024-06-30 LAB
ALBUMIN SERPL-MCNC: 2.3 G/DL (ref 3.5–5.2)
ALBUMIN SERPL-MCNC: 2.4 G/DL (ref 3.5–5.2)
ALBUMIN/GLOB SERPL: 1.2 {RATIO} (ref 1–2.5)
ALBUMIN/GLOB SERPL: 1.5 {RATIO} (ref 1–2.5)
ALP SERPL-CCNC: 119 U/L (ref 35–104)
ALP SERPL-CCNC: 121 U/L (ref 35–104)
ALT SERPL-CCNC: 18 U/L (ref 5–33)
ALT SERPL-CCNC: 19 U/L (ref 5–33)
ANION GAP SERPL CALCULATED.3IONS-SCNC: 11 MMOL/L (ref 9–17)
ANION GAP SERPL CALCULATED.3IONS-SCNC: 13 MMOL/L (ref 9–17)
AST SERPL-CCNC: 61 U/L
AST SERPL-CCNC: 63 U/L
BASOPHILS # BLD: <0.03 K/UL (ref 0–0.2)
BASOPHILS # BLD: <0.03 K/UL (ref 0–0.2)
BASOPHILS NFR BLD: 0 % (ref 0–2)
BASOPHILS NFR BLD: 0 % (ref 0–2)
BILIRUB SERPL-MCNC: 0.5 MG/DL (ref 0.3–1.2)
BILIRUB SERPL-MCNC: 0.6 MG/DL (ref 0.3–1.2)
BNP SERPL-MCNC: ABNORMAL PG/ML
BUN SERPL-MCNC: 28 MG/DL (ref 8–23)
BUN SERPL-MCNC: 33 MG/DL (ref 8–23)
BUN/CREAT SERPL: 16 (ref 9–20)
BUN/CREAT SERPL: 17 (ref 9–20)
CALCIUM SERPL-MCNC: 7.3 MG/DL (ref 8.6–10.4)
CALCIUM SERPL-MCNC: 7.5 MG/DL (ref 8.6–10.4)
CHLORIDE SERPL-SCNC: 103 MMOL/L (ref 98–107)
CHLORIDE SERPL-SCNC: 105 MMOL/L (ref 98–107)
CO2 SERPL-SCNC: 15 MMOL/L (ref 20–31)
CO2 SERPL-SCNC: 17 MMOL/L (ref 20–31)
CREAT SERPL-MCNC: 1.7 MG/DL (ref 0.5–0.9)
CREAT SERPL-MCNC: 1.9 MG/DL (ref 0.5–0.9)
EOSINOPHIL # BLD: 0.09 K/UL (ref 0–0.44)
EOSINOPHIL # BLD: 0.25 K/UL (ref 0–0.44)
EOSINOPHILS RELATIVE PERCENT: 1 % (ref 1–4)
EOSINOPHILS RELATIVE PERCENT: 1 % (ref 1–4)
ERYTHROCYTE [DISTWIDTH] IN BLOOD BY AUTOMATED COUNT: 15.5 % (ref 11.8–14.4)
ERYTHROCYTE [DISTWIDTH] IN BLOOD BY AUTOMATED COUNT: 15.6 % (ref 11.8–14.4)
GFR, ESTIMATED: 25 ML/MIN/1.73M2
GFR, ESTIMATED: 28 ML/MIN/1.73M2
GLUCOSE BLD-MCNC: 164 MG/DL (ref 65–105)
GLUCOSE SERPL-MCNC: 147 MG/DL (ref 70–99)
GLUCOSE SERPL-MCNC: 161 MG/DL (ref 70–99)
HCT VFR BLD AUTO: 26.1 % (ref 36.3–47.1)
HCT VFR BLD AUTO: 26.3 % (ref 36.3–47.1)
HGB BLD-MCNC: 8.1 G/DL (ref 11.9–15.1)
HGB BLD-MCNC: 8.6 G/DL (ref 11.9–15.1)
IMM GRANULOCYTES # BLD AUTO: 0.2 K/UL (ref 0–0.3)
IMM GRANULOCYTES # BLD AUTO: 0.25 K/UL (ref 0–0.3)
IMM GRANULOCYTES NFR BLD: 1 %
IMM GRANULOCYTES NFR BLD: 1 %
LYMPHOCYTES NFR BLD: 1.09 K/UL (ref 1.1–3.7)
LYMPHOCYTES NFR BLD: 1.09 K/UL (ref 1.1–3.7)
LYMPHOCYTES RELATIVE PERCENT: 6 % (ref 24–43)
LYMPHOCYTES RELATIVE PERCENT: 6 % (ref 24–43)
MAGNESIUM SERPL-MCNC: 1.6 MG/DL (ref 1.6–2.6)
MCH RBC QN AUTO: 27.5 PG (ref 25.2–33.5)
MCH RBC QN AUTO: 28.2 PG (ref 25.2–33.5)
MCHC RBC AUTO-ENTMCNC: 31 G/DL (ref 25.2–33.5)
MCHC RBC AUTO-ENTMCNC: 32.7 G/DL (ref 25.2–33.5)
MCV RBC AUTO: 86.2 FL (ref 82.6–102.9)
MCV RBC AUTO: 88.5 FL (ref 82.6–102.9)
MONOCYTES NFR BLD: 0.75 K/UL (ref 0.1–1.2)
MONOCYTES NFR BLD: 1 K/UL (ref 0.1–1.2)
MONOCYTES NFR BLD: 4 % (ref 3–12)
MONOCYTES NFR BLD: 6 % (ref 3–12)
NEGATIVE BASE EXCESS, ART: 8 MMOL/L (ref 0–2)
NEGATIVE BASE EXCESS, ART: 8.3 MMOL/L (ref 0–2)
NEUTROPHILS NFR BLD: 86 % (ref 36–65)
NEUTROPHILS NFR BLD: 88 % (ref 36–65)
NEUTS SEG NFR BLD: 15.74 K/UL (ref 1.5–8.1)
NEUTS SEG NFR BLD: 15.79 K/UL (ref 1.5–8.1)
NRBC BLD-RTO: 0 PER 100 WBC
NRBC BLD-RTO: 0 PER 100 WBC
PLATELET # BLD AUTO: 256 K/UL (ref 138–453)
PLATELET # BLD AUTO: 277 K/UL (ref 138–453)
PMV BLD AUTO: 10.2 FL (ref 8.1–13.5)
PMV BLD AUTO: 10.3 FL (ref 8.1–13.5)
POC HCO3: 16.8 MMOL/L (ref 21–28)
POC HCO3: 16.9 MMOL/L (ref 21–28)
POC O2 SATURATION: 94.9 % (ref 94–98)
POC O2 SATURATION: 96.1 % (ref 94–98)
POC PCO2: 31 MM HG (ref 35–48)
POC PCO2: 32.1 MM HG (ref 35–48)
POC PH: 7.33 (ref 7.35–7.45)
POC PH: 7.34 (ref 7.35–7.45)
POC PO2: 77.9 MM HG (ref 83–108)
POC PO2: 87.4 MM HG (ref 83–108)
POTASSIUM SERPL-SCNC: 3.9 MMOL/L (ref 3.7–5.3)
POTASSIUM SERPL-SCNC: 3.9 MMOL/L (ref 3.7–5.3)
PROT SERPL-MCNC: 4 G/DL (ref 6.4–8.3)
PROT SERPL-MCNC: 4.3 G/DL (ref 6.4–8.3)
RBC # BLD AUTO: 2.95 M/UL (ref 3.95–5.11)
RBC # BLD AUTO: 3.05 M/UL (ref 3.95–5.11)
RBC # BLD: ABNORMAL 10*6/UL
RBC # BLD: ABNORMAL 10*6/UL
SODIUM SERPL-SCNC: 131 MMOL/L (ref 135–144)
SODIUM SERPL-SCNC: 133 MMOL/L (ref 135–144)
WBC OTHER # BLD: 18.1 K/UL (ref 3.5–11.3)
WBC OTHER # BLD: 18.2 K/UL (ref 3.5–11.3)

## 2024-06-30 PROCEDURE — 94761 N-INVAS EAR/PLS OXIMETRY MLT: CPT

## 2024-06-30 PROCEDURE — 2500000003 HC RX 250 WO HCPCS: Performed by: SURGERY

## 2024-06-30 PROCEDURE — 6360000002 HC RX W HCPCS: Performed by: SURGERY

## 2024-06-30 PROCEDURE — 2700000000 HC OXYGEN THERAPY PER DAY

## 2024-06-30 PROCEDURE — 36600 WITHDRAWAL OF ARTERIAL BLOOD: CPT

## 2024-06-30 PROCEDURE — 94640 AIRWAY INHALATION TREATMENT: CPT

## 2024-06-30 PROCEDURE — 6370000000 HC RX 637 (ALT 250 FOR IP)

## 2024-06-30 PROCEDURE — 71045 X-RAY EXAM CHEST 1 VIEW: CPT

## 2024-06-30 PROCEDURE — C9113 INJ PANTOPRAZOLE SODIUM, VIA: HCPCS | Performed by: INTERNAL MEDICINE

## 2024-06-30 PROCEDURE — 5A0935A ASSISTANCE WITH RESPIRATORY VENTILATION, LESS THAN 24 CONSECUTIVE HOURS, HIGH NASAL FLOW/VELOCITY: ICD-10-PCS | Performed by: FAMILY MEDICINE

## 2024-06-30 PROCEDURE — 85025 COMPLETE CBC W/AUTO DIFF WBC: CPT

## 2024-06-30 PROCEDURE — 2580000003 HC RX 258: Performed by: INTERNAL MEDICINE

## 2024-06-30 PROCEDURE — 83880 ASSAY OF NATRIURETIC PEPTIDE: CPT

## 2024-06-30 PROCEDURE — 80053 COMPREHEN METABOLIC PANEL: CPT

## 2024-06-30 PROCEDURE — 82803 BLOOD GASES ANY COMBINATION: CPT

## 2024-06-30 PROCEDURE — 6360000002 HC RX W HCPCS: Performed by: INTERNAL MEDICINE

## 2024-06-30 PROCEDURE — 82947 ASSAY GLUCOSE BLOOD QUANT: CPT

## 2024-06-30 PROCEDURE — 94660 CPAP INITIATION&MGMT: CPT

## 2024-06-30 PROCEDURE — 83735 ASSAY OF MAGNESIUM: CPT

## 2024-06-30 PROCEDURE — 6360000002 HC RX W HCPCS: Performed by: FAMILY MEDICINE

## 2024-06-30 PROCEDURE — 2060000000 HC ICU INTERMEDIATE R&B

## 2024-06-30 PROCEDURE — 2000000000 HC ICU R&B

## 2024-06-30 PROCEDURE — 6360000002 HC RX W HCPCS

## 2024-06-30 PROCEDURE — 99233 SBSQ HOSP IP/OBS HIGH 50: CPT | Performed by: FAMILY MEDICINE

## 2024-06-30 PROCEDURE — 2500000003 HC RX 250 WO HCPCS: Performed by: INTERNAL MEDICINE

## 2024-06-30 PROCEDURE — 36415 COLL VENOUS BLD VENIPUNCTURE: CPT

## 2024-06-30 PROCEDURE — 2580000003 HC RX 258: Performed by: SURGERY

## 2024-06-30 PROCEDURE — 2580000003 HC RX 258

## 2024-06-30 PROCEDURE — 99024 POSTOP FOLLOW-UP VISIT: CPT | Performed by: PHYSICIAN ASSISTANT

## 2024-06-30 PROCEDURE — 6360000002 HC RX W HCPCS: Performed by: NURSE PRACTITIONER

## 2024-06-30 RX ORDER — DEXMEDETOMIDINE HYDROCHLORIDE 4 UG/ML
.1-1.5 INJECTION, SOLUTION INTRAVENOUS CONTINUOUS
Status: CANCELLED | OUTPATIENT
Start: 2024-06-30

## 2024-06-30 RX ORDER — 0.9 % SODIUM CHLORIDE 0.9 %
500 INTRAVENOUS SOLUTION INTRAVENOUS ONCE
Status: COMPLETED | OUTPATIENT
Start: 2024-06-30 | End: 2024-06-30

## 2024-06-30 RX ORDER — MAGNESIUM SULFATE IN WATER 40 MG/ML
2000 INJECTION, SOLUTION INTRAVENOUS ONCE
Status: COMPLETED | OUTPATIENT
Start: 2024-06-30 | End: 2024-06-30

## 2024-06-30 RX ORDER — FUROSEMIDE 10 MG/ML
20 INJECTION INTRAMUSCULAR; INTRAVENOUS EVERY 8 HOURS
Status: DISCONTINUED | OUTPATIENT
Start: 2024-06-30 | End: 2024-06-30

## 2024-06-30 RX ORDER — SODIUM CHLORIDE 9 MG/ML
INJECTION, SOLUTION INTRAVENOUS PRN
Status: DISCONTINUED | OUTPATIENT
Start: 2024-06-30 | End: 2024-07-04 | Stop reason: HOSPADM

## 2024-06-30 RX ORDER — 0.9 % SODIUM CHLORIDE 0.9 %
500 INTRAVENOUS SOLUTION INTRAVENOUS ONCE
Status: DISCONTINUED | OUTPATIENT
Start: 2024-06-30 | End: 2024-07-04 | Stop reason: HOSPADM

## 2024-06-30 RX ORDER — CHLORHEXIDINE GLUCONATE ORAL RINSE 1.2 MG/ML
15 SOLUTION DENTAL 2 TIMES DAILY
Status: CANCELLED | OUTPATIENT
Start: 2024-06-30

## 2024-06-30 RX ORDER — METOPROLOL TARTRATE 1 MG/ML
2.5 INJECTION, SOLUTION INTRAVENOUS EVERY 6 HOURS PRN
Status: DISCONTINUED | OUTPATIENT
Start: 2024-06-30 | End: 2024-07-04 | Stop reason: HOSPADM

## 2024-06-30 RX ORDER — FUROSEMIDE 10 MG/ML
20 INJECTION INTRAMUSCULAR; INTRAVENOUS ONCE
Status: COMPLETED | OUTPATIENT
Start: 2024-06-30 | End: 2024-06-30

## 2024-06-30 RX ORDER — BUMETANIDE 0.25 MG/ML
1 INJECTION INTRAMUSCULAR; INTRAVENOUS ONCE
Status: COMPLETED | OUTPATIENT
Start: 2024-06-30 | End: 2024-06-30

## 2024-06-30 RX ORDER — DIGOXIN 0.25 MG/ML
125 INJECTION INTRAMUSCULAR; INTRAVENOUS DAILY
Status: DISCONTINUED | OUTPATIENT
Start: 2024-06-30 | End: 2024-07-01

## 2024-06-30 RX ADMIN — SODIUM CHLORIDE, PRESERVATIVE FREE 40 MG: 5 INJECTION INTRAVENOUS at 08:15

## 2024-06-30 RX ADMIN — IPRATROPIUM BROMIDE AND ALBUTEROL SULFATE 1 DOSE: 2.5; .5 SOLUTION RESPIRATORY (INHALATION) at 15:51

## 2024-06-30 RX ADMIN — IPRATROPIUM BROMIDE AND ALBUTEROL SULFATE 1 DOSE: 2.5; .5 SOLUTION RESPIRATORY (INHALATION) at 11:45

## 2024-06-30 RX ADMIN — MAGNESIUM SULFATE HEPTAHYDRATE 2000 MG: 40 INJECTION, SOLUTION INTRAVENOUS at 08:42

## 2024-06-30 RX ADMIN — SODIUM CHLORIDE, PRESERVATIVE FREE 10 ML: 5 INJECTION INTRAVENOUS at 22:23

## 2024-06-30 RX ADMIN — MICONAZOLE NITRATE: 20 POWDER TOPICAL at 10:00

## 2024-06-30 RX ADMIN — IPRATROPIUM BROMIDE AND ALBUTEROL SULFATE 1 DOSE: 2.5; .5 SOLUTION RESPIRATORY (INHALATION) at 20:27

## 2024-06-30 RX ADMIN — BUMETANIDE 1 MG: 0.25 INJECTION INTRAMUSCULAR; INTRAVENOUS at 22:23

## 2024-06-30 RX ADMIN — ENOXAPARIN SODIUM 30 MG: 100 INJECTION SUBCUTANEOUS at 08:15

## 2024-06-30 RX ADMIN — HYDROMORPHONE HYDROCHLORIDE 0.25 MG: 1 INJECTION, SOLUTION INTRAMUSCULAR; INTRAVENOUS; SUBCUTANEOUS at 01:48

## 2024-06-30 RX ADMIN — I.V. FAT EMULSION 250 ML: 20 EMULSION INTRAVENOUS at 18:08

## 2024-06-30 RX ADMIN — AMIODARONE HYDROCHLORIDE 200 MG: 200 TABLET ORAL at 12:00

## 2024-06-30 RX ADMIN — PIPERACILLIN AND TAZOBACTAM 3375 MG: 3; .375 INJECTION, POWDER, LYOPHILIZED, FOR SOLUTION INTRAVENOUS at 01:33

## 2024-06-30 RX ADMIN — SODIUM BICARBONATE: 84 INJECTION INTRAVENOUS at 17:12

## 2024-06-30 RX ADMIN — DIGOXIN 125 MCG: 0.25 INJECTION INTRAMUSCULAR; INTRAVENOUS at 08:27

## 2024-06-30 RX ADMIN — MICONAZOLE NITRATE: 20 POWDER TOPICAL at 21:14

## 2024-06-30 RX ADMIN — SODIUM CHLORIDE, PRESERVATIVE FREE 10 ML: 5 INJECTION INTRAVENOUS at 08:30

## 2024-06-30 RX ADMIN — POTASSIUM CHLORIDE 10 MEQ: 7.46 INJECTION, SOLUTION INTRAVENOUS at 00:13

## 2024-06-30 RX ADMIN — SODIUM CHLORIDE, PRESERVATIVE FREE 10 ML: 5 INJECTION INTRAVENOUS at 12:02

## 2024-06-30 RX ADMIN — FUROSEMIDE 20 MG: 10 INJECTION, SOLUTION INTRAMUSCULAR; INTRAVENOUS at 12:02

## 2024-06-30 RX ADMIN — SODIUM CHLORIDE, PRESERVATIVE FREE 10 ML: 5 INJECTION INTRAVENOUS at 21:20

## 2024-06-30 RX ADMIN — PIPERACILLIN AND TAZOBACTAM 3375 MG: 3; .375 INJECTION, POWDER, LYOPHILIZED, FOR SOLUTION INTRAVENOUS at 09:42

## 2024-06-30 RX ADMIN — MIDODRINE HYDROCHLORIDE 10 MG: 5 TABLET ORAL at 12:02

## 2024-06-30 RX ADMIN — IPRATROPIUM BROMIDE AND ALBUTEROL SULFATE 1 DOSE: 2.5; .5 SOLUTION RESPIRATORY (INHALATION) at 08:56

## 2024-06-30 RX ADMIN — ASCORBIC ACID, VITAMIN A PALMITATE, CHOLECALCIFEROL, THIAMINE HYDROCHLORIDE, RIBOFLAVIN-5 PHOSPHATE SODIUM, PYRIDOXINE HYDROCHLORIDE, NIACINAMIDE, DEXPANTHENOL, ALPHA-TOCOPHEROL ACETATE, VITAMIN K1, FOLIC ACID, BIOTIN, CYANOCOBALAMIN: 200; 3300; 200; 6; 3.6; 6; 40; 15; 10; 150; 600; 60; 5 INJECTION, SOLUTION INTRAVENOUS at 18:05

## 2024-06-30 RX ADMIN — FUROSEMIDE 20 MG: 10 INJECTION, SOLUTION INTRAMUSCULAR; INTRAVENOUS at 17:45

## 2024-06-30 RX ADMIN — FUROSEMIDE 20 MG: 10 INJECTION, SOLUTION INTRAMUSCULAR; INTRAVENOUS at 01:23

## 2024-06-30 RX ADMIN — SODIUM CHLORIDE 500 ML: 9 INJECTION, SOLUTION INTRAVENOUS at 14:07

## 2024-06-30 RX ADMIN — PIPERACILLIN AND TAZOBACTAM 3375 MG: 3; .375 INJECTION, POWDER, LYOPHILIZED, FOR SOLUTION INTRAVENOUS at 17:41

## 2024-06-30 RX ADMIN — SODIUM CHLORIDE 500 ML: 9 INJECTION, SOLUTION INTRAVENOUS at 09:34

## 2024-06-30 RX ADMIN — SODIUM CHLORIDE, PRESERVATIVE FREE 10 ML: 5 INJECTION INTRAVENOUS at 21:13

## 2024-06-30 RX ADMIN — SUCRALFATE 1 G: 1 TABLET ORAL at 15:23

## 2024-06-30 ASSESSMENT — PAIN DESCRIPTION - LOCATION: LOCATION: ABDOMEN

## 2024-06-30 ASSESSMENT — PAIN SCALES - GENERAL
PAINLEVEL_OUTOF10: 4
PAINLEVEL_OUTOF10: 2
PAINLEVEL_OUTOF10: 4

## 2024-06-30 ASSESSMENT — PAIN DESCRIPTION - ORIENTATION: ORIENTATION: MID

## 2024-06-30 ASSESSMENT — PAIN - FUNCTIONAL ASSESSMENT: PAIN_FUNCTIONAL_ASSESSMENT: PREVENTS OR INTERFERES SOME ACTIVE ACTIVITIES AND ADLS

## 2024-06-30 ASSESSMENT — PAIN DESCRIPTION - DESCRIPTORS: DESCRIPTORS: ACHING

## 2024-06-30 NOTE — PLAN OF CARE
Problem: Pain  Goal: Verbalizes/displays adequate comfort level or baseline comfort level  Outcome: Progressing     Problem: ABCDS Injury Assessment  Goal: Absence of physical injury  Outcome: Progressing     Problem: Gastrointestinal - Adult  Goal: Minimal or absence of nausea and vomiting  Outcome: Progressing  Goal: Maintains or returns to baseline bowel function  Outcome: Progressing     Problem: Skin/Tissue Integrity - Adult  Goal: Skin integrity remains intact  Outcome: Progressing  Goal: Incisions, wounds, or drain sites healing without S/S of infection  Outcome: Progressing  Goal: Oral mucous membranes remain intact  Outcome: Progressing     Problem: Musculoskeletal - Adult  Goal: Return mobility to safest level of function  Outcome: Progressing  Goal: Maintain proper alignment of affected body part  Outcome: Progressing  Goal: Return ADL status to a safe level of function  Outcome: Progressing     Problem: Genitourinary - Adult  Goal: Absence of urinary retention  Outcome: Progressing     Problem: Infection - Adult  Goal: Absence of infection at discharge  Outcome: Progressing  Goal: Absence of infection during hospitalization  Outcome: Progressing  Goal: Absence of fever/infection during anticipated neutropenic period  Outcome: Progressing     Problem: Cardiovascular - Adult  Goal: Maintains optimal cardiac output and hemodynamic stability  Outcome: Progressing  Flowsheets (Taken 6/30/2024 0013 by Sveta Moreno, RN)  Maintains optimal cardiac output and hemodynamic stability: Monitor blood pressure and heart rate  Goal: Absence of cardiac dysrhythmias or at baseline  Outcome: Progressing  Flowsheets (Taken 6/30/2024 0013 by Sveta Moreno, RN)  Absence of cardiac dysrhythmias or at baseline: Monitor cardiac rate and rhythm

## 2024-06-30 NOTE — PROGRESS NOTES
General Surgery - Neri Paulino PA-C  Daily Progress Note  Pt Name: Soumya Smith  Medical Record Number: [id number]  Date of Birth 3/14/1933   Today's Date: 6/30/2024      SUBJECTIVE  Postop day 6 open right hemicolectomy  Is much more awake today, talking and answering questions appropriately,   still having pain around the incision  Tolerating bipap, ABG improved some this AM, bicarb coming up and acidosis improving some     Still sig edema in abdomen and legs/anasarca  CINDI drain shows serous fluid, is getting a significant amount out    Having bms    OBJECTIVE  CURRENT VITALS BP 94/64   Pulse (!) 156   Temp 99.7 °F (37.6 °C) (Tympanic)   Resp 22   Ht 1.626 m (5' 4\")   Wt 100.5 kg (221 lb 9.6 oz)   SpO2 94%   BMI 38.04 kg/m²   GENERAL: alert, cooperative, no distress, answering questions appriorately  LUNGS: on NC right now, appears to be breathing better than yesterday as we talk HEART:  irregular irregular, and intact distal pulses  ABDOMEN: soft, midline incision has no dehiscence, no surrounding erythema/warmth/tenderness, BS are present, pitting edema of the lower abdomen  EXTERMITY: 3+edema  24 HR INTAKE/OUTPUT :   Intake/Output Summary (Last 24 hours) at 6/30/2024 1149  Last data filed at 6/30/2024 1040  Gross per 24 hour   Intake 4620.02 ml   Output 2507 ml   Net 2113.02 ml     DRAIN/TUBE OUTPUT :  ,    Current Facility-Administered Medications   Medication Dose Route Frequency Provider Last Rate Last Admin    digoxin (LANOXIN) injection 125 mcg  125 mcg IntraVENous Daily Lester Sharma MD   125 mcg at 06/30/24 0827    furosemide (LASIX) injection 20 mg  20 mg IntraVENous Q8H Lester Sharma MD        enoxaparin Sodium (LOVENOX) injection 30 mg  30 mg SubCUTAneous Daily Lester Sharma MD   30 mg at 06/30/24 0815    PN-Adult Premix  4.25/10 - Standard Electrolytes - Peripheral Line   IntraVENous Continuous TPN Lamont Galvez MD 75 mL/hr at 06/30/24 0602 Rate Verify at

## 2024-06-30 NOTE — PROGRESS NOTES
Pt alert, oriented. Awake speaking with family resting comfortable in bed. Respiratory rate 23. Pt placed on 10 liter high flow at this time pox 93%. Giving Patient a bipap break at the moment.

## 2024-06-30 NOTE — CONSENT
Informed Consent for Blood Component Transfusion Note    I have discussed with the patient the rationale for blood component transfusion; its benefits in treating or preventing fatigue, organ damage, or death; and its risk which includes mild transfusion reactions, rare risk of blood borne infection, or more serious but rare reactions. I have discussed the alternatives to transfusion, including the risk and consequences of not receiving transfusion. The patient had an opportunity to ask questions and had agreed to proceed with transfusion of blood components.    Electronically signed by Neri Paulino PA-C on 6/30/24 at 12:27 PM EDT

## 2024-06-30 NOTE — PROGRESS NOTES
Physical Therapy  Attempted to see patient this date. Held therapy at this time per nursing. Will attempt tomorrow 7/01/24.    Rodolfo Graham PTA

## 2024-06-30 NOTE — PROGRESS NOTES
Hospitalist Progress Note  6/30/2024 11:09 AM  Subjective:   Admit Date: 6/23/2024  PCP: Jannet Wallace APRN - CNP    Interval History: Patient more alert today has been off BIPAP since the morning and on high flow nasal canula.Has had tachycardia was given  a dose of digoxin.WBC trended down slightly is on Zosyn .Has had bowel movements yesterday.Urine output slightly improved today has received some fluid boluses yesterday followed by lasix creatinine is at 1.7 this am has gained 4 lbs  weight since yesterday.    Diet: Diet NPO  PN-Adult Premix  4.25/10 - Standard Electrolytes - Peripheral Line  Medications:   Scheduled Meds:   digoxin  125 mcg IntraVENous Daily    sodium chloride  500 mL IntraVENous Once    furosemide  20 mg IntraVENous Q8H    enoxaparin  30 mg SubCUTAneous Daily    sodium chloride flush  5-40 mL IntraVENous 2 times per day    pantoprazole (PROTONIX) 40 mg in sodium chloride (PF) 0.9 % 10 mL injection  40 mg IntraVENous Daily    fat emulsion  250 mL IntraVENous Daily    metoprolol tartrate  25 mg Oral BID    piperacillin-tazobactam  3,375 mg IntraVENous Q8H    sodium chloride flush  10 mL IntraVENous 2 times per day    midodrine  10 mg Oral TID WC    miconazole   Topical BID    ipratropium 0.5 mg-albuterol 2.5 mg  1 Dose Inhalation Q4H WA RT    amiodarone  200 mg Oral Daily    [Held by provider] atorvastatin  40 mg Oral Daily    [Held by provider] docusate sodium  100 mg Oral Daily    sertraline  100 mg Oral Daily    sucralfate  1 g Oral TID AC    mometasone-formoterol  2 puff Inhalation BID RT    tiotropium  2 puff Inhalation Daily RT     Continuous Infusions:   PN-Adult Premix  4.25/10 - Standard Electrolytes - Peripheral Line 75 mL/hr at 06/30/24 0602    sodium bicarbonate 150 mEq in dextrose 5 % 1,000 mL infusion 50 mL/hr at 06/29/24 1924    0.9% NaCl with KCl 20 mEq Stopped (06/29/24 1265)    sodium chloride      sodium chloride Stopped (06/25/24 0159)     CBC:   Recent Labs

## 2024-07-01 ENCOUNTER — APPOINTMENT (OUTPATIENT)
Dept: GENERAL RADIOLOGY | Age: 89
End: 2024-07-01
Attending: FAMILY MEDICINE
Payer: MEDICARE

## 2024-07-01 ENCOUNTER — TELEPHONE (OUTPATIENT)
Dept: ONCOLOGY | Age: 89
End: 2024-07-01

## 2024-07-01 ENCOUNTER — APPOINTMENT (OUTPATIENT)
Age: 89
End: 2024-07-01
Attending: FAMILY MEDICINE
Payer: MEDICARE

## 2024-07-01 LAB
ALBUMIN SERPL-MCNC: 2.5 G/DL (ref 3.5–5.2)
ALBUMIN SERPL-MCNC: 2.9 G/DL (ref 3.5–5.2)
ALBUMIN/GLOB SERPL: 1.3 {RATIO} (ref 1–2.5)
ALBUMIN/GLOB SERPL: 1.5 {RATIO} (ref 1–2.5)
ALP SERPL-CCNC: 114 U/L (ref 35–104)
ALP SERPL-CCNC: 120 U/L (ref 35–104)
ALT SERPL-CCNC: 19 U/L (ref 5–33)
ALT SERPL-CCNC: 21 U/L (ref 5–33)
ANION GAP SERPL CALCULATED.3IONS-SCNC: 12 MMOL/L (ref 9–17)
ANION GAP SERPL CALCULATED.3IONS-SCNC: 13 MMOL/L (ref 9–17)
ANION GAP SERPL CALCULATED.3IONS-SCNC: 13 MMOL/L (ref 9–17)
AST SERPL-CCNC: 60 U/L
AST SERPL-CCNC: 63 U/L
BASOPHILS # BLD: <0.03 K/UL (ref 0–0.2)
BASOPHILS NFR BLD: 0 % (ref 0–2)
BILIRUB SERPL-MCNC: 0.6 MG/DL (ref 0.3–1.2)
BILIRUB SERPL-MCNC: 0.7 MG/DL (ref 0.3–1.2)
BUN SERPL-MCNC: 38 MG/DL (ref 8–23)
BUN SERPL-MCNC: 41 MG/DL (ref 8–23)
BUN SERPL-MCNC: 43 MG/DL (ref 8–23)
BUN/CREAT SERPL: 19 (ref 9–20)
BUN/CREAT SERPL: 19 (ref 9–20)
BUN/CREAT SERPL: 20 (ref 9–20)
CALCIUM SERPL-MCNC: 7.7 MG/DL (ref 8.6–10.4)
CALCIUM SERPL-MCNC: 7.7 MG/DL (ref 8.6–10.4)
CALCIUM SERPL-MCNC: 7.8 MG/DL (ref 8.6–10.4)
CHLORIDE SERPL-SCNC: 104 MMOL/L (ref 98–107)
CHLORIDE SERPL-SCNC: 105 MMOL/L (ref 98–107)
CHLORIDE SERPL-SCNC: 105 MMOL/L (ref 98–107)
CO2 SERPL-SCNC: 16 MMOL/L (ref 20–31)
CO2 SERPL-SCNC: 17 MMOL/L (ref 20–31)
CO2 SERPL-SCNC: 18 MMOL/L (ref 20–31)
CREAT SERPL-MCNC: 2 MG/DL (ref 0.5–0.9)
CREAT SERPL-MCNC: 2.1 MG/DL (ref 0.5–0.9)
CREAT SERPL-MCNC: 2.2 MG/DL (ref 0.5–0.9)
ECHO AO ASC DIAM: 4.1 CM
ECHO AO ASCENDING AORTA INDEX: 1.98 CM/M2
ECHO AO ROOT DIAM: 3.7 CM
ECHO AO ROOT INDEX: 1.79 CM/M2
ECHO AV AREA PEAK VELOCITY: 1.6 CM2
ECHO AV AREA/BSA PEAK VELOCITY: 0.8 CM2/M2
ECHO AV MEAN GRADIENT: 19 MMHG
ECHO AV MEAN VELOCITY: 2.1 M/S
ECHO AV PEAK GRADIENT: 28 MMHG
ECHO AV PEAK VELOCITY: 2.6 M/S
ECHO AV VELOCITY RATIO: 0.42
ECHO AV VTI: 48.4 CM
ECHO BSA: 1.98 M2
ECHO EST RA PRESSURE: 3 MMHG
ECHO LA AREA 4C: 13 CM2
ECHO LA DIAMETER INDEX: 1.35 CM/M2
ECHO LA DIAMETER: 2.8 CM
ECHO LA MAJOR AXIS: 5 CM
ECHO LA TO AORTIC ROOT RATIO: 0.76
ECHO LA VOL MOD A4C: 27 ML (ref 22–52)
ECHO LA VOLUME INDEX MOD A4C: 13 ML/M2 (ref 16–34)
ECHO LV EDV A4C: 67 ML
ECHO LV EDV INDEX A4C: 32 ML/M2
ECHO LV EJECTION FRACTION A4C: 65 %
ECHO LV ESV A4C: 23 ML
ECHO LV ESV INDEX A4C: 11 ML/M2
ECHO LV FRACTIONAL SHORTENING: 27 % (ref 28–44)
ECHO LV INTERNAL DIMENSION DIASTOLE INDEX: 1.59 CM/M2
ECHO LV INTERNAL DIMENSION DIASTOLIC: 3.3 CM (ref 3.9–5.3)
ECHO LV INTERNAL DIMENSION SYSTOLIC INDEX: 1.16 CM/M2
ECHO LV INTERNAL DIMENSION SYSTOLIC: 2.4 CM
ECHO LV ISOVOLUMETRIC RELAXATION TIME (IVRT): 86 MS
ECHO LV IVSD: 1.6 CM (ref 0.6–0.9)
ECHO LV MASS 2D: 178.7 G (ref 67–162)
ECHO LV MASS INDEX 2D: 86.3 G/M2 (ref 43–95)
ECHO LV POSTERIOR WALL DIASTOLIC: 1.4 CM (ref 0.6–0.9)
ECHO LV RELATIVE WALL THICKNESS RATIO: 0.85
ECHO LVOT AREA: 3.8 CM2
ECHO LVOT DIAM: 2.2 CM
ECHO LVOT PEAK GRADIENT: 5 MMHG
ECHO LVOT PEAK VELOCITY: 1.1 M/S
ECHO MV MAX VELOCITY: 1.7 M/S
ECHO MV PEAK GRADIENT: 11 MMHG
ECHO PV MAX VELOCITY: 1.1 M/S
ECHO PV PEAK GRADIENT: 5 MMHG
ECHO RIGHT VENTRICULAR SYSTOLIC PRESSURE (RVSP): 29 MMHG
ECHO TV PEAK GRADIENT: 3 MMHG
ECHO TV REGURGITANT MAX VELOCITY: 2.55 M/S
ECHO TV REGURGITANT PEAK GRADIENT: 26 MMHG
EOSINOPHIL # BLD: 0.09 K/UL (ref 0–0.44)
EOSINOPHIL # BLD: 0.12 K/UL (ref 0–0.44)
EOSINOPHIL # BLD: 0.14 K/UL (ref 0–0.44)
EOSINOPHILS RELATIVE PERCENT: 1 % (ref 1–4)
ERYTHROCYTE [DISTWIDTH] IN BLOOD BY AUTOMATED COUNT: 15.5 % (ref 11.8–14.4)
ERYTHROCYTE [DISTWIDTH] IN BLOOD BY AUTOMATED COUNT: 15.7 % (ref 11.8–14.4)
ERYTHROCYTE [DISTWIDTH] IN BLOOD BY AUTOMATED COUNT: 15.8 % (ref 11.8–14.4)
GFR, ESTIMATED: 21 ML/MIN/1.73M2
GFR, ESTIMATED: 22 ML/MIN/1.73M2
GFR, ESTIMATED: 23 ML/MIN/1.73M2
GLUCOSE BLD-MCNC: 129 MG/DL (ref 65–105)
GLUCOSE BLD-MCNC: 147 MG/DL (ref 65–105)
GLUCOSE BLD-MCNC: 175 MG/DL (ref 65–105)
GLUCOSE SERPL-MCNC: 153 MG/DL (ref 70–99)
GLUCOSE SERPL-MCNC: 162 MG/DL (ref 70–99)
GLUCOSE SERPL-MCNC: 175 MG/DL (ref 70–99)
HCO3 VENOUS: 16.3 MMOL/L (ref 22–29)
HCT VFR BLD AUTO: 24.7 % (ref 36.3–47.1)
HCT VFR BLD AUTO: 24.9 % (ref 36.3–47.1)
HCT VFR BLD AUTO: 25 % (ref 36.3–47.1)
HGB BLD-MCNC: 7.9 G/DL (ref 11.9–15.1)
HGB BLD-MCNC: 8 G/DL (ref 11.9–15.1)
HGB BLD-MCNC: 8 G/DL (ref 11.9–15.1)
IMM GRANULOCYTES # BLD AUTO: 0.16 K/UL (ref 0–0.3)
IMM GRANULOCYTES # BLD AUTO: 0.26 K/UL (ref 0–0.3)
IMM GRANULOCYTES # BLD AUTO: 0.28 K/UL (ref 0–0.3)
IMM GRANULOCYTES NFR BLD: 1 %
IMM GRANULOCYTES NFR BLD: 1 %
IMM GRANULOCYTES NFR BLD: 2 %
LYMPHOCYTES NFR BLD: 0.84 K/UL (ref 1.1–3.7)
LYMPHOCYTES NFR BLD: 0.86 K/UL (ref 1.1–3.7)
LYMPHOCYTES NFR BLD: 0.93 K/UL (ref 1.1–3.7)
LYMPHOCYTES RELATIVE PERCENT: 5 % (ref 24–43)
MCH RBC QN AUTO: 27.7 PG (ref 25.2–33.5)
MCH RBC QN AUTO: 27.8 PG (ref 25.2–33.5)
MCH RBC QN AUTO: 27.9 PG (ref 25.2–33.5)
MCHC RBC AUTO-ENTMCNC: 31.7 G/DL (ref 25.2–33.5)
MCHC RBC AUTO-ENTMCNC: 32 G/DL (ref 25.2–33.5)
MCHC RBC AUTO-ENTMCNC: 32.4 G/DL (ref 25.2–33.5)
MCV RBC AUTO: 86.1 FL (ref 82.6–102.9)
MCV RBC AUTO: 86.5 FL (ref 82.6–102.9)
MCV RBC AUTO: 87.7 FL (ref 82.6–102.9)
MONOCYTES NFR BLD: 0.83 K/UL (ref 0.1–1.2)
MONOCYTES NFR BLD: 0.89 K/UL (ref 0.1–1.2)
MONOCYTES NFR BLD: 0.93 K/UL (ref 0.1–1.2)
MONOCYTES NFR BLD: 5 % (ref 3–12)
NEGATIVE BASE EXCESS, ART: 8 MMOL/L (ref 0–2)
NEGATIVE BASE EXCESS, VEN: 9.7 MMOL/L (ref 0–2)
NEUTROPHILS NFR BLD: 87 % (ref 36–65)
NEUTROPHILS NFR BLD: 88 % (ref 36–65)
NEUTROPHILS NFR BLD: 88 % (ref 36–65)
NEUTS SEG NFR BLD: 15.63 K/UL (ref 1.5–8.1)
NEUTS SEG NFR BLD: 16.37 K/UL (ref 1.5–8.1)
NEUTS SEG NFR BLD: 16.81 K/UL (ref 1.5–8.1)
NRBC BLD-RTO: 0 PER 100 WBC
O2 SAT, VEN: 99.3 % (ref 60–85)
PCO2 VENOUS: 35.2 MM HG (ref 41–51)
PH VENOUS: 7.27 (ref 7.32–7.43)
PLATELET # BLD AUTO: 264 K/UL (ref 138–453)
PLATELET # BLD AUTO: 291 K/UL (ref 138–453)
PLATELET # BLD AUTO: 306 K/UL (ref 138–453)
PMV BLD AUTO: 10.6 FL (ref 8.1–13.5)
PMV BLD AUTO: 10.6 FL (ref 8.1–13.5)
PMV BLD AUTO: 10.7 FL (ref 8.1–13.5)
PO2 VENOUS: 169.4 MM HG (ref 30–50)
POC HCO3: 15.7 MMOL/L (ref 21–28)
POC O2 SATURATION: 98.2 % (ref 94–98)
POC PCO2: 25.6 MM HG (ref 35–48)
POC PH: 7.39 (ref 7.35–7.45)
POC PO2: 105.4 MM HG (ref 83–108)
POTASSIUM SERPL-SCNC: 3.9 MMOL/L (ref 3.7–5.3)
POTASSIUM SERPL-SCNC: 4.1 MMOL/L (ref 3.7–5.3)
POTASSIUM SERPL-SCNC: 4.3 MMOL/L (ref 3.7–5.3)
PROT SERPL-MCNC: 4.5 G/DL (ref 6.4–8.3)
PROT SERPL-MCNC: 4.8 G/DL (ref 6.4–8.3)
RBC # BLD AUTO: 2.84 M/UL (ref 3.95–5.11)
RBC # BLD AUTO: 2.87 M/UL (ref 3.95–5.11)
RBC # BLD AUTO: 2.89 M/UL (ref 3.95–5.11)
RBC # BLD: ABNORMAL 10*6/UL
SODIUM SERPL-SCNC: 134 MMOL/L (ref 135–144)
SODIUM SERPL-SCNC: 134 MMOL/L (ref 135–144)
SODIUM SERPL-SCNC: 135 MMOL/L (ref 135–144)
WBC OTHER # BLD: 17.8 K/UL (ref 3.5–11.3)
WBC OTHER # BLD: 18.5 K/UL (ref 3.5–11.3)
WBC OTHER # BLD: 18.9 K/UL (ref 3.5–11.3)

## 2024-07-01 PROCEDURE — 6370000000 HC RX 637 (ALT 250 FOR IP)

## 2024-07-01 PROCEDURE — 2500000003 HC RX 250 WO HCPCS: Performed by: FAMILY MEDICINE

## 2024-07-01 PROCEDURE — 80048 BASIC METABOLIC PNL TOTAL CA: CPT

## 2024-07-01 PROCEDURE — 2700000000 HC OXYGEN THERAPY PER DAY

## 2024-07-01 PROCEDURE — 87205 SMEAR GRAM STAIN: CPT

## 2024-07-01 PROCEDURE — 99024 POSTOP FOLLOW-UP VISIT: CPT | Performed by: PHYSICIAN ASSISTANT

## 2024-07-01 PROCEDURE — 97140 MANUAL THERAPY 1/> REGIONS: CPT

## 2024-07-01 PROCEDURE — 2580000003 HC RX 258

## 2024-07-01 PROCEDURE — 82947 ASSAY GLUCOSE BLOOD QUANT: CPT

## 2024-07-01 PROCEDURE — 93306 TTE W/DOPPLER COMPLETE: CPT

## 2024-07-01 PROCEDURE — 6360000002 HC RX W HCPCS: Performed by: NURSE PRACTITIONER

## 2024-07-01 PROCEDURE — 94640 AIRWAY INHALATION TREATMENT: CPT

## 2024-07-01 PROCEDURE — 2500000003 HC RX 250 WO HCPCS: Performed by: INTERNAL MEDICINE

## 2024-07-01 PROCEDURE — 87077 CULTURE AEROBIC IDENTIFY: CPT

## 2024-07-01 PROCEDURE — 6360000002 HC RX W HCPCS: Performed by: FAMILY MEDICINE

## 2024-07-01 PROCEDURE — 36415 COLL VENOUS BLD VENIPUNCTURE: CPT

## 2024-07-01 PROCEDURE — 99232 SBSQ HOSP IP/OBS MODERATE 35: CPT | Performed by: INTERNAL MEDICINE

## 2024-07-01 PROCEDURE — 6370000000 HC RX 637 (ALT 250 FOR IP): Performed by: NURSE PRACTITIONER

## 2024-07-01 PROCEDURE — 2000000000 HC ICU R&B

## 2024-07-01 PROCEDURE — 6360000002 HC RX W HCPCS: Performed by: PHYSICIAN ASSISTANT

## 2024-07-01 PROCEDURE — 6360000002 HC RX W HCPCS: Performed by: INTERNAL MEDICINE

## 2024-07-01 PROCEDURE — 36600 WITHDRAWAL OF ARTERIAL BLOOD: CPT

## 2024-07-01 PROCEDURE — 87070 CULTURE OTHR SPECIMN AEROBIC: CPT

## 2024-07-01 PROCEDURE — 6360000002 HC RX W HCPCS

## 2024-07-01 PROCEDURE — 2580000003 HC RX 258: Performed by: PHYSICIAN ASSISTANT

## 2024-07-01 PROCEDURE — 71045 X-RAY EXAM CHEST 1 VIEW: CPT

## 2024-07-01 PROCEDURE — 82803 BLOOD GASES ANY COMBINATION: CPT

## 2024-07-01 PROCEDURE — 85025 COMPLETE CBC W/AUTO DIFF WBC: CPT

## 2024-07-01 PROCEDURE — 94761 N-INVAS EAR/PLS OXIMETRY MLT: CPT

## 2024-07-01 PROCEDURE — 94660 CPAP INITIATION&MGMT: CPT

## 2024-07-01 PROCEDURE — P9047 ALBUMIN (HUMAN), 25%, 50ML: HCPCS | Performed by: NURSE PRACTITIONER

## 2024-07-01 PROCEDURE — 87186 SC STD MICRODIL/AGAR DIL: CPT

## 2024-07-01 PROCEDURE — 93306 TTE W/DOPPLER COMPLETE: CPT | Performed by: INTERNAL MEDICINE

## 2024-07-01 PROCEDURE — 2580000003 HC RX 258: Performed by: INTERNAL MEDICINE

## 2024-07-01 PROCEDURE — 80053 COMPREHEN METABOLIC PANEL: CPT

## 2024-07-01 RX ORDER — SODIUM CHLORIDE 1 G/1
1 TABLET ORAL
Status: DISCONTINUED | OUTPATIENT
Start: 2024-07-01 | End: 2024-07-04

## 2024-07-01 RX ORDER — OXYCODONE HYDROCHLORIDE 5 MG/1
2.5 TABLET ORAL EVERY 4 HOURS PRN
Status: DISCONTINUED | OUTPATIENT
Start: 2024-07-01 | End: 2024-07-04 | Stop reason: HOSPADM

## 2024-07-01 RX ORDER — ALBUMIN (HUMAN) 12.5 G/50ML
50 SOLUTION INTRAVENOUS ONCE
Status: COMPLETED | OUTPATIENT
Start: 2024-07-01 | End: 2024-07-01

## 2024-07-01 RX ORDER — BUMETANIDE 0.25 MG/ML
1 INJECTION INTRAMUSCULAR; INTRAVENOUS ONCE
Status: COMPLETED | OUTPATIENT
Start: 2024-07-01 | End: 2024-07-01

## 2024-07-01 RX ORDER — 0.9 % SODIUM CHLORIDE 0.9 %
1000 INTRAVENOUS SOLUTION INTRAVENOUS ONCE
Status: COMPLETED | OUTPATIENT
Start: 2024-07-01 | End: 2024-07-01

## 2024-07-01 RX ADMIN — IPRATROPIUM BROMIDE AND ALBUTEROL SULFATE 1 DOSE: 2.5; .5 SOLUTION RESPIRATORY (INHALATION) at 20:24

## 2024-07-01 RX ADMIN — ENOXAPARIN SODIUM 30 MG: 100 INJECTION SUBCUTANEOUS at 07:57

## 2024-07-01 RX ADMIN — SODIUM CHLORIDE 1000 ML: 9 INJECTION, SOLUTION INTRAVENOUS at 08:24

## 2024-07-01 RX ADMIN — BUMETANIDE 1 MG/HR: 0.25 INJECTION INTRAMUSCULAR; INTRAVENOUS at 16:56

## 2024-07-01 RX ADMIN — ASCORBIC ACID, VITAMIN A PALMITATE, CHOLECALCIFEROL, THIAMINE HYDROCHLORIDE, RIBOFLAVIN-5 PHOSPHATE SODIUM, PYRIDOXINE HYDROCHLORIDE, NIACINAMIDE, DEXPANTHENOL, ALPHA-TOCOPHEROL ACETATE, VITAMIN K1, FOLIC ACID, BIOTIN, CYANOCOBALAMIN: 200; 3300; 200; 6; 3.6; 6; 40; 15; 10; 150; 600; 60; 5 INJECTION, SOLUTION INTRAVENOUS at 18:32

## 2024-07-01 RX ADMIN — METOPROLOL TARTRATE 2.5 MG: 5 INJECTION INTRAVENOUS at 00:33

## 2024-07-01 RX ADMIN — METOPROLOL TARTRATE 2.5 MG: 5 INJECTION INTRAVENOUS at 22:26

## 2024-07-01 RX ADMIN — SODIUM CHLORIDE, PRESERVATIVE FREE 10 ML: 5 INJECTION INTRAVENOUS at 08:05

## 2024-07-01 RX ADMIN — SODIUM CHLORIDE, PRESERVATIVE FREE 10 ML: 5 INJECTION INTRAVENOUS at 21:32

## 2024-07-01 RX ADMIN — IPRATROPIUM BROMIDE AND ALBUTEROL SULFATE 1 DOSE: 2.5; .5 SOLUTION RESPIRATORY (INHALATION) at 15:28

## 2024-07-01 RX ADMIN — POTASSIUM CHLORIDE AND SODIUM CHLORIDE: 900; 150 INJECTION, SOLUTION INTRAVENOUS at 07:56

## 2024-07-01 RX ADMIN — IPRATROPIUM BROMIDE AND ALBUTEROL SULFATE 1 DOSE: 2.5; .5 SOLUTION RESPIRATORY (INHALATION) at 07:54

## 2024-07-01 RX ADMIN — PIPERACILLIN AND TAZOBACTAM 3375 MG: 3; .375 INJECTION, POWDER, LYOPHILIZED, FOR SOLUTION INTRAVENOUS at 00:40

## 2024-07-01 RX ADMIN — ALBUMIN (HUMAN) 50 G: 0.25 INJECTION, SOLUTION INTRAVENOUS at 02:02

## 2024-07-01 RX ADMIN — MICONAZOLE NITRATE: 20 POWDER TOPICAL at 10:38

## 2024-07-01 RX ADMIN — I.V. FAT EMULSION 250 ML: 20 EMULSION INTRAVENOUS at 18:34

## 2024-07-01 RX ADMIN — PIPERACILLIN AND TAZOBACTAM 3375 MG: 3; .375 INJECTION, POWDER, LYOPHILIZED, FOR SOLUTION INTRAVENOUS at 21:32

## 2024-07-01 RX ADMIN — SODIUM CHLORIDE, PRESERVATIVE FREE 40 MG: 5 INJECTION INTRAVENOUS at 07:56

## 2024-07-01 RX ADMIN — PIPERACILLIN AND TAZOBACTAM 3375 MG: 3; .375 INJECTION, POWDER, LYOPHILIZED, FOR SOLUTION INTRAVENOUS at 09:18

## 2024-07-01 RX ADMIN — IPRATROPIUM BROMIDE AND ALBUTEROL SULFATE 1 DOSE: 2.5; .5 SOLUTION RESPIRATORY (INHALATION) at 11:28

## 2024-07-01 RX ADMIN — MICONAZOLE NITRATE: 20 POWDER TOPICAL at 21:00

## 2024-07-01 RX ADMIN — SODIUM CHLORIDE 1000 ML: 9 INJECTION, SOLUTION INTRAVENOUS at 16:41

## 2024-07-01 RX ADMIN — BUMETANIDE 1 MG: 0.25 INJECTION INTRAMUSCULAR; INTRAVENOUS at 15:17

## 2024-07-01 RX ADMIN — Medication: at 11:19

## 2024-07-01 RX ADMIN — HYDROMORPHONE HYDROCHLORIDE 0.25 MG: 1 INJECTION, SOLUTION INTRAMUSCULAR; INTRAVENOUS; SUBCUTANEOUS at 02:39

## 2024-07-01 RX ADMIN — SODIUM CHLORIDE, PRESERVATIVE FREE 10 ML: 5 INJECTION INTRAVENOUS at 14:46

## 2024-07-01 ASSESSMENT — PAIN SCALES - WONG BAKER
WONGBAKER_NUMERICALRESPONSE: HURTS LITTLE MORE
WONGBAKER_NUMERICALRESPONSE: HURTS A LITTLE BIT

## 2024-07-01 ASSESSMENT — PAIN DESCRIPTION - DESCRIPTORS: DESCRIPTORS: SORE;SHARP

## 2024-07-01 ASSESSMENT — PAIN - FUNCTIONAL ASSESSMENT: PAIN_FUNCTIONAL_ASSESSMENT: PREVENTS OR INTERFERES WITH MANY ACTIVE NOT PASSIVE ACTIVITIES

## 2024-07-01 ASSESSMENT — PAIN DESCRIPTION - LOCATION: LOCATION: ABDOMEN

## 2024-07-01 ASSESSMENT — PAIN SCALES - GENERAL
PAINLEVEL_OUTOF10: 4
PAINLEVEL_OUTOF10: 0

## 2024-07-01 ASSESSMENT — PAIN DESCRIPTION - ORIENTATION: ORIENTATION: RIGHT;MID

## 2024-07-01 NOTE — TELEPHONE ENCOUNTER
Name: Soumya Smith  : 3/14/1933  MRN: [id number]    Oncology Navigation- Initial Note:    Navigator received navigation assignment via pathology and reviewing chart now.   Diagnosis: Adenocarcinoma of distal ascending/proximal transverse colon   Patient is currently admitted at [facility]  Plan:  Oncology Nurse Navigator will monitor and engage with patient if appropriate after discharge.    Electronically signed by Allie Do RN on 2024 at 8:43 AM

## 2024-07-01 NOTE — PLAN OF CARE
Problem: Discharge Planning  Goal: Discharge to home or other facility with appropriate resources  6/30/2024 2054 by Mansi Luna RN  Outcome: Progressing  Flowsheets (Taken 6/30/2024 2000)  Discharge to home or other facility with appropriate resources:   Identify barriers to discharge with patient and caregiver   Refer to discharge planning if patient needs post-hospital services based on physician order or complex needs related to functional status, cognitive ability or social support system  6/30/2024 1339 by Shakila Bertrand RN  Outcome: Progressing     Problem: Pain  Goal: Verbalizes/displays adequate comfort level or baseline comfort level  6/30/2024 2054 by Mansi Luna RN  Outcome: Progressing  6/30/2024 1339 by Shakila Bertrand RN  Outcome: Progressing     Problem: ABCDS Injury Assessment  Goal: Absence of physical injury  6/30/2024 2054 by Mansi Luna RN  Outcome: Progressing  6/30/2024 1339 by Shakila Bertrand RN  Outcome: Progressing     Problem: Safety - Adult  Goal: Free from fall injury  6/30/2024 2054 by Mansi Luna RN  Outcome: Progressing  6/30/2024 1339 by Shakila Bertrand RN  Outcome: Progressing     Problem: Gastrointestinal - Adult  Goal: Minimal or absence of nausea and vomiting  6/30/2024 2054 by Mansi Luna RN  Outcome: Progressing  Flowsheets (Taken 6/30/2024 2000)  Minimal or absence of nausea and vomiting:   Administer IV fluids as ordered to ensure adequate hydration   Maintain NPO status until nausea and vomiting are resolved   Administer ordered antiemetic medications as needed   Advance diet as tolerated, if ordered  6/30/2024 1339 by Shakila Bertrand RN  Outcome: Progressing  Goal: Maintains or returns to baseline bowel function  6/30/2024 2054 by Mansi Luna RN  Outcome: Progressing  Flowsheets (Taken 6/30/2024 2000)  Maintains or returns to baseline bowel function:   Assess bowel function   Administer IV fluids as ordered to

## 2024-07-01 NOTE — FLOWSHEET NOTE
Physical Therapy     Per nursing, requests to hold all bed mobility due to decreased strength and cognition. Attempted therapy to complete joint PROM only, however pt shook head no multiple times and was not agreeable to session. Will attempt therapy tomorrow 7/2/24.     Cristy Mancuso, PTA

## 2024-07-01 NOTE — PROGRESS NOTES
Heavenly Rainey, PPatient Assessment complete. 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   Vitals:    07/01/24 1528   BP:    Pulse: (!) 106   Resp: 20   Temp:    SpO2: 95%   . Patients home meds are   Prior to Admission medications    Medication Sig Start Date End Date Taking? Authorizing Provider   magnesium oxide (MAG-OX) 400 (240 Mg) MG tablet  5/1/24   Lakeisha Vizcarra MD   ASPERCREME LIDOCAINE 4 % CREA  5/8/24   Lakeisha Vizcarra MD   LIDOCAINE PAIN RELIEF 4 % external patch  4/15/24   Lakeisha Vizcarra MD   acetaminophen (TYLENOL) 500 MG tablet Take 2 tablets by mouth every 6 hours as needed for Fever or Pain 12/8/23 12/7/24  Lakeisha Vizcarra MD   albuterol sulfate HFA (PROVENTIL;VENTOLIN;PROAIR) 108 (90 Base) MCG/ACT inhaler Inhale 2 puffs into the lungs every 4 hours as needed for Shortness of Breath or Wheezing 5/30/23   Lakeisha Vizcarra MD   albuterol (PROVENTIL) (2.5 MG/3ML) 0.083% nebulizer solution Inhale 3 mLs into the lungs every 4 hours as needed for Shortness of Breath or Wheezing 3/16/23   Lakeisha Vizcarra MD   amiodarone (CORDARONE) 200 MG tablet Take 1 tablet by mouth daily 11/23/22   Lakeisha Vizcarra MD   apixaban (ELIQUIS) 2.5 MG TABS tablet Take 2 tablets by mouth 2 times daily    Lakeisha Vizcarra MD   ascorbic acid (VITAMIN C) 250 MG tablet Take 1 tablet by mouth daily    Lakeisha Vizcarra MD   docusate (COLACE, DULCOLAX) 100 MG CAPS Take 100 mg by mouth daily    Lakeisha Vizcarra MD   fluticasone-umeclidin-vilant (TRELEGY ELLIPTA) 100-62.5-25 MCG/ACT AEPB inhaler Inhale 1 puff into the lungs daily    Lakeisha Vizcarra MD   furosemide (LASIX) 40 MG tablet Take 1 tablet by mouth daily 12/8/23   Lakeisha Vizcarra MD   pantoprazole (PROTONIX) 40 MG tablet Take 1 tablet by mouth daily 4/3/23   Lakeisha Vizcarra, MD  35-49%  []  Unable []  Less than 35%  []  Unable                 [x]  Hyperinflation Assessment  Score 1 2 3   CXR and Breath Sounds   [x]  Clear []  No atelectasis  Basilar aeration []  Atelectasis or absent basilar breath sounds   Incentive Spirometry Volume  (Per IBW)   []  Greater than or equal to 15ml/Kg []  less than 15ml/Kg []  less than 15ml/Kg   Surgery within last 2 weeks []  None or general   [x]  Abdominal or thoracic surgery  []  Abdominal or thoracic   Chronic Pulmonary Historyre [x]  No []  Yes []  Yes     [x]  Secretion Management Assessment  Score 1 2 3   Bilateral Breath Sounds   [x]  Occasional Rhonchi []  Scattered Rhonchi []  Course Rhonchi and/or poor aeration   Sputum    [x]  Small amount of thin secretions []  Moderate amount of viscous secretions []  Copius, Viscious Yellow/ Secretions   CXR as reported by physician [x]  clear  []  Unavailable []  Infiltrates and/or consolidation  []  Unavailable []  Mucus Plugging and or lobar consolidation  []  Unavailable   Cough [x]  Strong, productive cough []  Weak productive cough []  No cough or weak non-productive cough   Heavenly Rainey, [facility]  4:42 PM                            FEMALE                                  MALE                            FEV1 Predicted Normal Values                        FEV1 Predicted Normal Values          Age                                     Height in Feet and Inches       Age                                     Height in Feet and Inches       4' 11\" 5' 1\" 5' 3\" 5' 5\" 5' 7\" 5' 9\" 5' 11\" 6' 1\"  4' 11\" 5' 1\" 5' 3\" 5' 5\" 5' 7\" 5' 9\" 5' 11\" 6' 1\"   42 - 45 2.49 2.66 2.84 3.03 3.22 3.42 3.62 3.83 42 - 45 2.82 3.03 3.26 3.49 3.72 3.96 4.22 4.47   46 - 49 2.40 2.57 2.76 2.94 3.14 3.33 3.54 3.75 46 - 49 2.70 2.92 3.14 3.37 3.61 3.85 4.10 4.36   50 - 53 2.31 2.48 2.66 2.85 3.04 3.24 3.45 3.66 50 - 53 2.58 2.80 3.02 3.25 3.49 3.73 3.98 4.24   54 - 57 2.21 2.38 2.57 2.75 2.95 3.14 3.35 3.56 54 - 57 2.46 2.67 2.89 3.12 3.36 3.60

## 2024-07-01 NOTE — PROGRESS NOTES
Returned Dr Miranda's phone call. Updated him on current urine output and of ABG's drawn at 1910. New orders received to give a 500ml bolus of 0.9% over 1 hour, d/c lasix, and to give bumex 1mg IV X1 after the bolus is finished. Updated Nany Evans CNP of new orders on patient.

## 2024-07-01 NOTE — PROGRESS NOTES
Comprehensive Nutrition Assessment    Type and Reason for Visit:  Reassess    Nutrition Recommendations/Plan:   Cont TPN: Adult Premix 4.25/10 at 75 ml/hr.  Cont lipids: 250 ml 20% over 12 hrs.  Cont PO intake as tolerated.  Monitor and follow.     Malnutrition Assessment:  Malnutrition Status:  Severe malnutrition (r/t decreased intakes and sever fluid accumulation) (06/28/24 1039)    Context:  Acute Illness     Findings of the 6 clinical characteristics of malnutrition:  Energy Intake:  50% or less of estimated energy requirements for 5 or more days  Weight Loss:  Unable to assess     Body Fat Loss:  Unable to assess     Muscle Mass Loss:  Unable to assess    Fluid Accumulation:  Moderate to Severe     Strength:       Nutrition Assessment:    Pt continues with poor PO intakes, even thouygh diet has been advanced to dysphagia soft anf bite-sized. She had not touched her breakfastand was sleeping at ~10:00 am upon visit. TPN Premix 4.25/10 running @ 75 ml/hr. Pt tolerating TPN well. Severe edema remains in extremities, abdomen. Current net I/O is +14,945 ml with weight gain ~37#. Positive BS. LBM 6/29.    Nutrition Related Findings:    Meds/labs reviewed. IVF continuing @ 100 ml/hr. Renal function worsening. Hgb 7.9. Glu 164-175 past 24 hrs. Pro BNP > 26,000. Wound Type: Surgical Incision       Current Nutrition Intake & Therapies:    Average Meal Intake: 1-25%, 0%  Average Supplements Intake: None Ordered  PN-Adult Premix  4.25/10 - Standard Electrolytes - Peripheral Line  ADULT DIET; Dysphagia - Soft and Bite Sized  PN-Adult Premix  4.25/10 - Standard Electrolytes - Peripheral Line  Current Parenteral Nutrition Orders:  Type and Formula: Premix Central   Lipids: 250ml (21 ml/hr x 12 hrs)  Duration: Continuous  Rate/Volume: 75ml/hr PreMix = 1800 ml  Current PN Order Provides: 1800ml = 1380 kcal, 73g AA, 173g dextrose, 55g lipid  Goal PN Orders Provides: At goal    Anthropometric Measures:  Height: 162.6 cm (5'  4\")  Ideal Body Weight (IBW): 120 lbs (55 kg)    Admission Body Weight: 86.5 kg (190 lb 11.2 oz)  Current Body Weight: 103.1 kg (227 lb 4.8 oz), 189.4 % IBW. Weight Source: Bed Scale  Current BMI (kg/m2): 39  Usual Body Weight:  (UTD)     Weight Adjustment For: No Adjustment                 BMI Categories: Obese Class 2 (BMI 35.0 -39.9)    Estimated Daily Nutrient Needs:  Energy Requirements Based On: Kcal/kg  Weight Used for Energy Requirements: Admission  Energy (kcal/day): 6204-9789  Weight Used for Protein Requirements: Ideal  Protein (g/day): 60-70     Fluid (ml/day): Per physician    Nutrition Diagnosis:   Severe malnutrition related to acute injury/trauma, altered GI function, altered GI structure as evidenced by Criteria as identified in malnutrition assessment    Nutrition Interventions:   Food and/or Nutrient Delivery: Continue Current Parenteral Nutrition  Nutrition Education/Counseling: No recommendation at this time  Coordination of Nutrition Care: Continue to monitor while inpatient  Plan of Care discussed with: Family    Goals:  Previous Goal Met: No Progress toward Goal(s)  Goals: PO intake 50% or greater, prior to discharge       Nutrition Monitoring and Evaluation:   Behavioral-Environmental Outcomes: None Identified  Food/Nutrient Intake Outcomes: Diet Advancement/Tolerance, Food and Nutrient Intake, Parenteral Nutrition Intake/Tolerance  Physical Signs/Symptoms Outcomes: Biochemical Data, GI Status, Fluid Status or Edema, Nausea or Vomiting, Hemodynamic Status, Skin, Weight    Discharge Planning:    Too soon to determine     AVTAR JOHNSON RD  Contact: [phone]

## 2024-07-01 NOTE — PROGRESS NOTES
Occupational Therapy  Facility/Department: [facility]  PROGRESSIVE CARE  Daily Treatment Note  NAME: Soumya Smith  : 3/14/1933  MRN: [id number]    Date of Service: 2024    Discharge Recommendations:  Subacute/Skilled Nursing Facility, Long Term Care with OT, Patient would benefit from continued therapy after discharge         Patient Diagnosis(es): The primary encounter diagnosis was Shortness of breath. A diagnosis of Malignant neoplasm of hepatic flexure (HCC) was also pertinent to this visit.     Assessment    Activity Tolerance: Patient limited by pain;Patient limited by fatigue;Patient limited by endurance  Discharge Recommendations: Subacute/Skilled Nursing Facility;Long Term Care with OT;Patient would benefit from continued therapy after discharge      Plan   Occupational Therapy Plan  Times Per Week: 3-5  Therapy Duration: 4-7 Days  Current Treatment Recommendations: Patient/Caregiver education & training;Equipment evaluation, education, & procurement;Functional mobility training;Self-Care / ADL     Restrictions       Subjective   Subjective  Subjective: Patient lying supine in bed, agreeable for OT session.  Orientation  Overall Orientation Status:  (Patient with minimal response today with slights head nods agreeable to therapy)  Orientation Level: Oriented to person;Disoriented to situation  Cognition  Cognition Comment: Minimal resonses to verbal questions        Objective    Vitals  Vitals  Pulse: (!) 103  SpO2: 94 %  BP: 101/61  MAP (Calculated): 74  BP Location: Left upper arm  Bed Mobility Training  Bed Mobility Training: No  Overall Level of Assistance: Maximum assistance;Assist X1  Rolling: Maximum assistance (Max A for rolling and reaching for bed mobiltiy with minimal response.)  Supine to Sit:  (Discussing with nursing today patient unable to get up EOB for therapy due to decreased alertness.)        OT Exercises  PROM Exercises: PROM for all joints supine in be to max ROM with stretch in all

## 2024-07-01 NOTE — PROGRESS NOTES
Hospitalist Progress Note    Patient:  Soumya Smith     YOB: 1933    MRN: [id number]   Admit date: 6/23/2024     Acct: [id number]     PCP: Jannet Wallace APRN - CNP    CC--Interval History: POD 7 open right hemicolectomy-sigmoidoscopy---6.24.2024---CINDI still high output    CKD--Stage 4--most worrisome---likely intravascular depletion and third spacing---significant peripheral edema.    On TPN    Abdominal wound opened--no pus        On BiPAP---occasional 10 liters    See note below     All other ROS negative except noted in HPI    Diet:  PN-Adult Premix  4.25/10 - Standard Electrolytes - Peripheral Line  ADULT DIET; Dysphagia - Soft and Bite Sized    Medications:  Scheduled Meds:   sodium chloride  1 g Oral TID WC    sodium chloride  1,000 mL IntraVENous Once    sodium chloride  500 mL IntraVENous Once    enoxaparin  30 mg SubCUTAneous Daily    sodium chloride flush  5-40 mL IntraVENous 2 times per day    pantoprazole (PROTONIX) 40 mg in sodium chloride (PF) 0.9 % 10 mL injection  40 mg IntraVENous Daily    fat emulsion  250 mL IntraVENous Daily    metoprolol tartrate  25 mg Oral BID    piperacillin-tazobactam  3,375 mg IntraVENous Q8H    sodium chloride flush  10 mL IntraVENous 2 times per day    midodrine  10 mg Oral TID WC    miconazole   Topical BID    ipratropium 0.5 mg-albuterol 2.5 mg  1 Dose Inhalation Q4H WA RT    amiodarone  200 mg Oral Daily    [Held by provider] atorvastatin  40 mg Oral Daily    [Held by provider] docusate sodium  100 mg Oral Daily    sertraline  100 mg Oral Daily    sucralfate  1 g Oral TID AC    mometasone-formoterol  2 puff Inhalation BID RT    tiotropium  2 puff Inhalation Daily RT     Continuous Infusions:   sodium chloride      PN-Adult Premix  4.25/10 - Standard Electrolytes - Peripheral Line 75 mL/hr at 07/01/24 0515    sodium bicarbonate 150 mEq in dextrose 5 % 1,000 mL infusion 50 mL/hr at 07/01/24 0515    0.9% NaCl with KCl 20 mEq 50 mL/hr at 07/01/24  Zosyn IV     Sodium replacement per TPN    Watch kidney function    See orders     Electronically signed by Lamont Galvez MD on 7/1/2024 at 8:59 AM    Hospitalist

## 2024-07-01 NOTE — PROGRESS NOTES
rounding in PCU.    Assessment: Patient was resting in bed, and accompanied by her daughter-in-law ([place]) who left shortly after the  arrived.  Patient wore an oxygen mask that prevented her from talking, but she did indicate permission for prayer, and fell asleep during that time.     07/01/24 1535   Encounter Summary   Encounter Overview/Reason Initial Encounter;Spiritual/Emotional Needs   Service Provided For Patient   Referral/Consult From Rounding   Support System Family members   Last Encounter  07/01/24   Complexity of Encounter Moderate   Begin Time 1348   End Time  1353   Total Time Calculated 5 min   Spiritual/Emotional needs   Type Spiritual Support   Assessment/Intervention/Outcome   Assessment Coping   Intervention Prayer (assurance of)/[place];Sustaining Presence/Ministry of presence   Outcome Acceptance   Plan and Referrals   Does the patient have a BS PCP? Yes    to follow up after discharge? No         Intervention: Engaged in conversation and active listening. Prayed with Patient.     Outcome: Patient expressed appreciation for visit and offer of continued prayer.    Plan: Chaplains are available on site or on call 24/7 for spiritual and emotional support.    Electronically signed by Astrid Yanez on 7/1/2024 at 3:36 PM

## 2024-07-01 NOTE — PROGRESS NOTES
Physical Therapy  Attempted to see patient this date. Per nursing, patient drowsy from medication. Would like to attempt to get patient up later this date. Will attempt later this date.     Rodolfo Graham PTA

## 2024-07-02 ENCOUNTER — APPOINTMENT (OUTPATIENT)
Dept: GENERAL RADIOLOGY | Age: 89
End: 2024-07-02
Attending: FAMILY MEDICINE
Payer: MEDICARE

## 2024-07-02 LAB
ALBUMIN SERPL-MCNC: 2.3 G/DL (ref 3.5–5.2)
ALBUMIN SERPL-MCNC: 2.4 G/DL (ref 3.5–5.2)
ALBUMIN SERPL-MCNC: 2.5 G/DL (ref 3.5–5.2)
ALBUMIN/GLOB SERPL: 1 {RATIO} (ref 1–2.5)
ALBUMIN/GLOB SERPL: 1.1 {RATIO} (ref 1–2.5)
ALBUMIN/GLOB SERPL: 1.2 {RATIO} (ref 1–2.5)
ALP SERPL-CCNC: 122 U/L (ref 35–104)
ALP SERPL-CCNC: 124 U/L (ref 35–104)
ALP SERPL-CCNC: 147 U/L (ref 35–104)
ALT SERPL-CCNC: 21 U/L (ref 5–33)
ALT SERPL-CCNC: 21 U/L (ref 5–33)
ALT SERPL-CCNC: 23 U/L (ref 5–33)
ANION GAP SERPL CALCULATED.3IONS-SCNC: 11 MMOL/L (ref 9–17)
ANION GAP SERPL CALCULATED.3IONS-SCNC: 12 MMOL/L (ref 9–17)
ANION GAP SERPL CALCULATED.3IONS-SCNC: 14 MMOL/L (ref 9–17)
AST SERPL-CCNC: 59 U/L
AST SERPL-CCNC: 61 U/L
AST SERPL-CCNC: 79 U/L
BASOPHILS # BLD: 0.03 K/UL (ref 0–0.2)
BASOPHILS # BLD: 0.04 K/UL (ref 0–0.2)
BASOPHILS # BLD: 0.04 K/UL (ref 0–0.2)
BASOPHILS NFR BLD: 0 % (ref 0–2)
BILIRUB SERPL-MCNC: 0.5 MG/DL (ref 0.3–1.2)
BILIRUB SERPL-MCNC: 0.6 MG/DL (ref 0.3–1.2)
BILIRUB SERPL-MCNC: 1.1 MG/DL (ref 0.3–1.2)
BUN SERPL-MCNC: 47 MG/DL (ref 8–23)
BUN SERPL-MCNC: 52 MG/DL (ref 8–23)
BUN SERPL-MCNC: 58 MG/DL (ref 8–23)
BUN/CREAT SERPL: 21 (ref 9–20)
BUN/CREAT SERPL: 23 (ref 9–20)
BUN/CREAT SERPL: 24 (ref 9–20)
CALCIUM SERPL-MCNC: 8 MG/DL (ref 8.6–10.4)
CALCIUM SERPL-MCNC: 8.1 MG/DL (ref 8.6–10.4)
CALCIUM SERPL-MCNC: 8.3 MG/DL (ref 8.6–10.4)
CHLORIDE SERPL-SCNC: 105 MMOL/L (ref 98–107)
CHLORIDE SERPL-SCNC: 106 MMOL/L (ref 98–107)
CHLORIDE SERPL-SCNC: 106 MMOL/L (ref 98–107)
CO2 SERPL-SCNC: 16 MMOL/L (ref 20–31)
CO2 SERPL-SCNC: 16 MMOL/L (ref 20–31)
CO2 SERPL-SCNC: 18 MMOL/L (ref 20–31)
CREAT SERPL-MCNC: 2.2 MG/DL (ref 0.5–0.9)
CREAT SERPL-MCNC: 2.3 MG/DL (ref 0.5–0.9)
CREAT SERPL-MCNC: 2.4 MG/DL (ref 0.5–0.9)
EOSINOPHIL # BLD: 0.08 K/UL (ref 0–0.44)
EOSINOPHIL # BLD: 0.13 K/UL (ref 0–0.44)
EOSINOPHIL # BLD: 0.15 K/UL (ref 0–0.44)
EOSINOPHILS RELATIVE PERCENT: 1 % (ref 1–4)
ERYTHROCYTE [DISTWIDTH] IN BLOOD BY AUTOMATED COUNT: 15.6 % (ref 11.8–14.4)
ERYTHROCYTE [DISTWIDTH] IN BLOOD BY AUTOMATED COUNT: 15.9 % (ref 11.8–14.4)
ERYTHROCYTE [DISTWIDTH] IN BLOOD BY AUTOMATED COUNT: 16.1 % (ref 11.8–14.4)
GFR, ESTIMATED: 19 ML/MIN/1.73M2
GFR, ESTIMATED: 20 ML/MIN/1.73M2
GFR, ESTIMATED: 21 ML/MIN/1.73M2
GLUCOSE BLD-MCNC: 155 MG/DL (ref 65–105)
GLUCOSE SERPL-MCNC: 148 MG/DL (ref 70–99)
GLUCOSE SERPL-MCNC: 162 MG/DL (ref 70–99)
GLUCOSE SERPL-MCNC: 167 MG/DL (ref 70–99)
HCT VFR BLD AUTO: 24.1 % (ref 36.3–47.1)
HCT VFR BLD AUTO: 26.3 % (ref 36.3–47.1)
HCT VFR BLD AUTO: 27.8 % (ref 36.3–47.1)
HCT VFR BLD AUTO: 28.7 % (ref 36.3–47.1)
HGB BLD-MCNC: 7.8 G/DL (ref 11.9–15.1)
HGB BLD-MCNC: 8.4 G/DL (ref 11.9–15.1)
HGB BLD-MCNC: 9.2 G/DL (ref 11.9–15.1)
HGB BLD-MCNC: 9.6 G/DL (ref 11.9–15.1)
IMM GRANULOCYTES # BLD AUTO: 0.09 K/UL (ref 0–0.3)
IMM GRANULOCYTES # BLD AUTO: 0.11 K/UL (ref 0–0.3)
IMM GRANULOCYTES # BLD AUTO: 0.15 K/UL (ref 0–0.3)
IMM GRANULOCYTES NFR BLD: 1 %
LYMPHOCYTES NFR BLD: 0.96 K/UL (ref 1.1–3.7)
LYMPHOCYTES NFR BLD: 1.06 K/UL (ref 1.1–3.7)
LYMPHOCYTES NFR BLD: 1.21 K/UL (ref 1.1–3.7)
LYMPHOCYTES RELATIVE PERCENT: 6 % (ref 24–43)
MAGNESIUM SERPL-MCNC: 1.9 MG/DL (ref 1.6–2.6)
MCH RBC QN AUTO: 27.5 PG (ref 25.2–33.5)
MCH RBC QN AUTO: 28.3 PG (ref 25.2–33.5)
MCH RBC QN AUTO: 28.7 PG (ref 25.2–33.5)
MCHC RBC AUTO-ENTMCNC: 31.9 G/DL (ref 25.2–33.5)
MCHC RBC AUTO-ENTMCNC: 32.4 G/DL (ref 25.2–33.5)
MCHC RBC AUTO-ENTMCNC: 33.4 G/DL (ref 25.2–33.5)
MCV RBC AUTO: 85.7 FL (ref 82.6–102.9)
MCV RBC AUTO: 86.2 FL (ref 82.6–102.9)
MCV RBC AUTO: 87.3 FL (ref 82.6–102.9)
MONOCYTES NFR BLD: 0.89 K/UL (ref 0.1–1.2)
MONOCYTES NFR BLD: 0.9 K/UL (ref 0.1–1.2)
MONOCYTES NFR BLD: 1.06 K/UL (ref 0.1–1.2)
MONOCYTES NFR BLD: 5 % (ref 3–12)
NEGATIVE BASE EXCESS, ART: 7.7 MMOL/L (ref 0–2)
NEUTROPHILS NFR BLD: 87 % (ref 36–65)
NEUTS SEG NFR BLD: 14.53 K/UL (ref 1.5–8.1)
NEUTS SEG NFR BLD: 16.11 K/UL (ref 1.5–8.1)
NEUTS SEG NFR BLD: 17.08 K/UL (ref 1.5–8.1)
NRBC BLD-RTO: 0 PER 100 WBC
PLATELET # BLD AUTO: 292 K/UL (ref 138–453)
PLATELET # BLD AUTO: 306 K/UL (ref 138–453)
PLATELET # BLD AUTO: 352 K/UL (ref 138–453)
PMV BLD AUTO: 10.2 FL (ref 8.1–13.5)
PMV BLD AUTO: 10.2 FL (ref 8.1–13.5)
PMV BLD AUTO: 10.6 FL (ref 8.1–13.5)
POC HCO3: 18.7 MMOL/L (ref 21–28)
POC O2 SATURATION: 96.3 % (ref 94–98)
POC PCO2: 40.5 MM HG (ref 35–48)
POC PH: 7.27 (ref 7.35–7.45)
POC PO2: 95 MM HG (ref 83–108)
POTASSIUM SERPL-SCNC: 3.9 MMOL/L (ref 3.7–5.3)
POTASSIUM SERPL-SCNC: 4.1 MMOL/L (ref 3.7–5.3)
POTASSIUM SERPL-SCNC: 4.3 MMOL/L (ref 3.7–5.3)
PROT SERPL-MCNC: 4.5 G/DL (ref 6.4–8.3)
PROT SERPL-MCNC: 4.6 G/DL (ref 6.4–8.3)
PROT SERPL-MCNC: 4.6 G/DL (ref 6.4–8.3)
RBC # BLD AUTO: 2.76 M/UL (ref 3.95–5.11)
RBC # BLD AUTO: 3.05 M/UL (ref 3.95–5.11)
RBC # BLD AUTO: 3.35 M/UL (ref 3.95–5.11)
RBC # BLD: ABNORMAL 10*6/UL
SODIUM SERPL-SCNC: 133 MMOL/L (ref 135–144)
SODIUM SERPL-SCNC: 135 MMOL/L (ref 135–144)
SODIUM SERPL-SCNC: 136 MMOL/L (ref 135–144)
WBC OTHER # BLD: 16.6 K/UL (ref 3.5–11.3)
WBC OTHER # BLD: 18.4 K/UL (ref 3.5–11.3)
WBC OTHER # BLD: 19.7 K/UL (ref 3.5–11.3)

## 2024-07-02 PROCEDURE — 99024 POSTOP FOLLOW-UP VISIT: CPT | Performed by: PHYSICIAN ASSISTANT

## 2024-07-02 PROCEDURE — 30233N1 TRANSFUSION OF NONAUTOLOGOUS RED BLOOD CELLS INTO PERIPHERAL VEIN, PERCUTANEOUS APPROACH: ICD-10-PCS | Performed by: FAMILY MEDICINE

## 2024-07-02 PROCEDURE — 2580000003 HC RX 258

## 2024-07-02 PROCEDURE — 97535 SELF CARE MNGMENT TRAINING: CPT

## 2024-07-02 PROCEDURE — 2580000003 HC RX 258: Performed by: PHYSICIAN ASSISTANT

## 2024-07-02 PROCEDURE — 36415 COLL VENOUS BLD VENIPUNCTURE: CPT

## 2024-07-02 PROCEDURE — 2000000000 HC ICU R&B

## 2024-07-02 PROCEDURE — 6370000000 HC RX 637 (ALT 250 FOR IP)

## 2024-07-02 PROCEDURE — 36430 TRANSFUSION BLD/BLD COMPNT: CPT

## 2024-07-02 PROCEDURE — 6360000002 HC RX W HCPCS: Performed by: PHYSICIAN ASSISTANT

## 2024-07-02 PROCEDURE — 6360000002 HC RX W HCPCS: Performed by: INTERNAL MEDICINE

## 2024-07-02 PROCEDURE — 2580000003 HC RX 258: Performed by: SURGERY

## 2024-07-02 PROCEDURE — 2700000000 HC OXYGEN THERAPY PER DAY

## 2024-07-02 PROCEDURE — 37799 UNLISTED PX VASCULAR SURGERY: CPT

## 2024-07-02 PROCEDURE — P9047 ALBUMIN (HUMAN), 25%, 50ML: HCPCS | Performed by: SURGERY

## 2024-07-02 PROCEDURE — 83735 ASSAY OF MAGNESIUM: CPT

## 2024-07-02 PROCEDURE — 82803 BLOOD GASES ANY COMBINATION: CPT

## 2024-07-02 PROCEDURE — 2500000003 HC RX 250 WO HCPCS: Performed by: INTERNAL MEDICINE

## 2024-07-02 PROCEDURE — 94660 CPAP INITIATION&MGMT: CPT

## 2024-07-02 PROCEDURE — 85018 HEMOGLOBIN: CPT

## 2024-07-02 PROCEDURE — 6370000000 HC RX 637 (ALT 250 FOR IP): Performed by: INTERNAL MEDICINE

## 2024-07-02 PROCEDURE — 94761 N-INVAS EAR/PLS OXIMETRY MLT: CPT

## 2024-07-02 PROCEDURE — 6360000002 HC RX W HCPCS: Performed by: SURGERY

## 2024-07-02 PROCEDURE — 94640 AIRWAY INHALATION TREATMENT: CPT

## 2024-07-02 PROCEDURE — 6370000000 HC RX 637 (ALT 250 FOR IP): Performed by: NURSE PRACTITIONER

## 2024-07-02 PROCEDURE — 71045 X-RAY EXAM CHEST 1 VIEW: CPT

## 2024-07-02 PROCEDURE — 85025 COMPLETE CBC W/AUTO DIFF WBC: CPT

## 2024-07-02 PROCEDURE — 2580000003 HC RX 258: Performed by: INTERNAL MEDICINE

## 2024-07-02 PROCEDURE — 80053 COMPREHEN METABOLIC PANEL: CPT

## 2024-07-02 PROCEDURE — 82947 ASSAY GLUCOSE BLOOD QUANT: CPT

## 2024-07-02 PROCEDURE — 85014 HEMATOCRIT: CPT

## 2024-07-02 PROCEDURE — 6360000002 HC RX W HCPCS: Performed by: FAMILY MEDICINE

## 2024-07-02 PROCEDURE — P9016 RBC LEUKOCYTES REDUCED: HCPCS

## 2024-07-02 RX ORDER — ALBUMIN (HUMAN) 12.5 G/50ML
25 SOLUTION INTRAVENOUS ONCE
Status: COMPLETED | OUTPATIENT
Start: 2024-07-02 | End: 2024-07-02

## 2024-07-02 RX ORDER — 0.9 % SODIUM CHLORIDE 0.9 %
1000 INTRAVENOUS SOLUTION INTRAVENOUS ONCE
Status: COMPLETED | OUTPATIENT
Start: 2024-07-02 | End: 2024-07-02

## 2024-07-02 RX ORDER — 0.9 % SODIUM CHLORIDE 0.9 %
1000 INTRAVENOUS SOLUTION INTRAVENOUS ONCE
Status: COMPLETED | OUTPATIENT
Start: 2024-07-02 | End: 2024-07-03

## 2024-07-02 RX ORDER — 0.9 % SODIUM CHLORIDE 0.9 %
1000 INTRAVENOUS SOLUTION INTRAVENOUS ONCE
Status: DISCONTINUED | OUTPATIENT
Start: 2024-07-02 | End: 2024-07-02

## 2024-07-02 RX ORDER — SODIUM CHLORIDE 9 MG/ML
INJECTION, SOLUTION INTRAVENOUS
Status: DISPENSED
Start: 2024-07-02 | End: 2024-07-02

## 2024-07-02 RX ORDER — BUMETANIDE 0.25 MG/ML
INJECTION INTRAMUSCULAR; INTRAVENOUS
Status: DISPENSED
Start: 2024-07-02 | End: 2024-07-02

## 2024-07-02 RX ORDER — SODIUM CHLORIDE 9 MG/ML
INJECTION, SOLUTION INTRAVENOUS PRN
Status: DISCONTINUED | OUTPATIENT
Start: 2024-07-02 | End: 2024-07-04 | Stop reason: HOSPADM

## 2024-07-02 RX ADMIN — SERTRALINE 100 MG: 50 TABLET, FILM COATED ORAL at 08:12

## 2024-07-02 RX ADMIN — IPRATROPIUM BROMIDE AND ALBUTEROL SULFATE 1 DOSE: 2.5; .5 SOLUTION RESPIRATORY (INHALATION) at 09:05

## 2024-07-02 RX ADMIN — MIDODRINE HYDROCHLORIDE 10 MG: 5 TABLET ORAL at 08:11

## 2024-07-02 RX ADMIN — SODIUM CHLORIDE, PRESERVATIVE FREE 10 ML: 5 INJECTION INTRAVENOUS at 10:18

## 2024-07-02 RX ADMIN — BUMETANIDE 1 MG/HR: 0.25 INJECTION INTRAMUSCULAR; INTRAVENOUS at 17:00

## 2024-07-02 RX ADMIN — SODIUM CHLORIDE 1000 ML: 9 INJECTION, SOLUTION INTRAVENOUS at 10:01

## 2024-07-02 RX ADMIN — BUMETANIDE 1 MG/HR: 0.25 INJECTION INTRAMUSCULAR; INTRAVENOUS at 04:06

## 2024-07-02 RX ADMIN — ALBUMIN (HUMAN) 25 G: 0.25 INJECTION, SOLUTION INTRAVENOUS at 10:17

## 2024-07-02 RX ADMIN — IPRATROPIUM BROMIDE AND ALBUTEROL SULFATE 1 DOSE: 2.5; .5 SOLUTION RESPIRATORY (INHALATION) at 16:17

## 2024-07-02 RX ADMIN — I.V. FAT EMULSION 250 ML: 20 EMULSION INTRAVENOUS at 20:29

## 2024-07-02 RX ADMIN — IPRATROPIUM BROMIDE AND ALBUTEROL SULFATE 1 DOSE: 2.5; .5 SOLUTION RESPIRATORY (INHALATION) at 19:49

## 2024-07-02 RX ADMIN — PIPERACILLIN AND TAZOBACTAM 3375 MG: 3; .375 INJECTION, POWDER, LYOPHILIZED, FOR SOLUTION INTRAVENOUS at 22:14

## 2024-07-02 RX ADMIN — MICONAZOLE NITRATE: 20 POWDER TOPICAL at 22:14

## 2024-07-02 RX ADMIN — SODIUM CHLORIDE 1 G: 1 TABLET ORAL at 08:15

## 2024-07-02 RX ADMIN — MICONAZOLE NITRATE: 20 POWDER TOPICAL at 08:17

## 2024-07-02 RX ADMIN — METOPROLOL TARTRATE 25 MG: 25 TABLET, FILM COATED ORAL at 08:11

## 2024-07-02 RX ADMIN — SODIUM CHLORIDE, PRESERVATIVE FREE 10 ML: 5 INJECTION INTRAVENOUS at 22:09

## 2024-07-02 RX ADMIN — Medication: at 10:38

## 2024-07-02 RX ADMIN — AMIODARONE HYDROCHLORIDE 200 MG: 200 TABLET ORAL at 08:12

## 2024-07-02 RX ADMIN — MIDODRINE HYDROCHLORIDE 10 MG: 5 TABLET ORAL at 12:04

## 2024-07-02 RX ADMIN — PIPERACILLIN AND TAZOBACTAM 3375 MG: 3; .375 INJECTION, POWDER, LYOPHILIZED, FOR SOLUTION INTRAVENOUS at 08:37

## 2024-07-02 RX ADMIN — SODIUM CHLORIDE 1000 ML: 9 INJECTION, SOLUTION INTRAVENOUS at 23:34

## 2024-07-02 RX ADMIN — ENOXAPARIN SODIUM 30 MG: 100 INJECTION SUBCUTANEOUS at 08:12

## 2024-07-02 RX ADMIN — IPRATROPIUM BROMIDE AND ALBUTEROL SULFATE 1 DOSE: 2.5; .5 SOLUTION RESPIRATORY (INHALATION) at 11:53

## 2024-07-02 RX ADMIN — SODIUM CHLORIDE, PRESERVATIVE FREE 40 MG: 5 INJECTION INTRAVENOUS at 08:12

## 2024-07-02 RX ADMIN — METOPROLOL TARTRATE 25 MG: 25 TABLET, FILM COATED ORAL at 22:09

## 2024-07-02 RX ADMIN — LEUCINE, PHENYLALANINE, LYSINE, METHIONINE, ISOLEUCINE, VALINE, HISTIDINE, THREONINE, TRYPTOPHAN, ALANINE, GLYCINE, ARGININE, PROLINE, SERINE, TYROSINE, SODIUM ACETATE, DIBASIC POTASSIUM PHOSPHATE, MAGNESIUM CHLORIDE, SODIUM CHLORIDE, CALCIUM CHLORIDE, DEXTROSE
311; 238; 247; 170; 255; 247; 204; 179; 77; 880; 438; 489; 289; 213; 17; 297; 261; 51; 77; 33; 10 INJECTION INTRAVENOUS at 20:28

## 2024-07-02 ASSESSMENT — PAIN SCALES - WONG BAKER
WONGBAKER_NUMERICALRESPONSE: HURTS A LITTLE BIT

## 2024-07-02 NOTE — PROGRESS NOTES
Returned call to [place] local director of [employer] at [phone], ext [phone]--left message per her request regarding pt's Dx and that discharge date has yet to be determined.  RAÚL Thacker RN

## 2024-07-02 NOTE — PROGRESS NOTES
Hospitalist Progress Note    Patient:  Soumya Smith     YOB: 1933    MRN: [id number]   Admit date: 6/23/2024     Acct: [id number]     PCP: Jannet Wallace APRN - CNP    CC--Interval History: POD 8--open right hemicolectomy for adenocarcinoma---6.24.2024---Miranda    CINDI drainage beginning to decrease    Receiving one unit PRBCs per GS    Urine output--improved with Bumex gtt, however, CRE creeping upward--GFR ~ 21---kidney function will determine the success    WBCs--still 18.9 --> 18.4 range    Has been on Zosyn since 6.28.2024--continue??????    Cont'd BiPAP --> O2 sat 95%--will help while getting fluids.    On TPN, HCO3 gtt, IVF---total 175 excluding boluses    See note below    All other ROS negative except noted in HPI    Diet:  ADULT DIET; Dysphagia - Soft and Bite Sized  PN-Adult Premix  4.25/10 - Standard Electrolytes - Peripheral Line    Medications:  Scheduled Meds:   bumetanide        bumetanide        sodium chloride  1 g Oral TID WC    piperacillin-tazobactam  3,375 mg IntraVENous Q12H    sodium chloride  500 mL IntraVENous Once    enoxaparin  30 mg SubCUTAneous Daily    sodium chloride flush  5-40 mL IntraVENous 2 times per day    pantoprazole (PROTONIX) 40 mg in sodium chloride (PF) 0.9 % 10 mL injection  40 mg IntraVENous Daily    fat emulsion  250 mL IntraVENous Daily    metoprolol tartrate  25 mg Oral BID    sodium chloride flush  10 mL IntraVENous 2 times per day    midodrine  10 mg Oral TID WC    miconazole   Topical BID    ipratropium 0.5 mg-albuterol 2.5 mg  1 Dose Inhalation Q4H WA RT    amiodarone  200 mg Oral Daily    [Held by provider] atorvastatin  40 mg Oral Daily    [Held by provider] docusate sodium  100 mg Oral Daily    sertraline  100 mg Oral Daily    sucralfate  1 g Oral TID AC    mometasone-formoterol  2 puff Inhalation BID RT    tiotropium  2 puff Inhalation Daily RT     Continuous Infusions:   sodium chloride      sodium bicarbonate 150 mEq in sodium chloride 0.9 %  EKG---6.24.2024---atrial fibrillation--72--RBBB--LAHB--septal infarction           CT AP---6.20.2024---see below---sigmoid diverticulitis---increased pelvic wall fluid          CT AP---6.17.2024---mild sigmoid diverticulitis--hypodensity right lobe liver             Colonoscopy---6.7.2024---mass @ 100 cm--transverse colon           EGD----6.7.2024--polyp with polypectomy    Diverticulitis--sigmoid---acute--6.17.2024  Atelectasis                  Atrial fibrillation         MICHAEL---11.3.2023----LAE--lipomatous hypertrophy IAS----mildly restricted AV cusp---mild reduced LVSF---                             no thrombus--calcified AV--mild to moderate AS--trace MR--mild-to-moderate TR---                             LVEF ~ 45-50%         2D ECHO---11.21.2022---LA mildly dilated-----RA mildly dilated--normal atrial septum---                            asymmetric LVH---NLVSF--NRVSF----mild calcification AV---no AS---MAC---                            trace MR-TR---RVSP ~ 20 mm Hg----mild dilatation ascending aorta--4.0 cm--                            AR 3.7 cm--diastolic function uncertain due to atrial fibrillation--LVEF ~ 60-65%         MICHAEL---cardioversion--2018  AS---aortic stenosis--see above                   III/VI CELIO  ASCVD  HTN  Hyperlipidemia  Diabetes Mellitus Type 2  CKD---Stage 3b à 4  COPD  GERD  Overweight   Depression   Tobacco use---onset 7.15.1972--quit---7.15.2012    PMH:   cancer---ear--eyelid--nose, diverticulitis---6.17.2024, frequent falls, RLL PNA,               altered mental status--etiology uncertain, syncope---collapse, hypomagnesemia,               Vitamin D deficiency, Vitamin B12 anemia,   PSH:   see above, appendectomy, hysterectomy--cervix unknown, cholecystectomy, T&A    Allergies:        NKDA  Intolerances:  codeine---nausea-vomiting, hydrocodone---nausea-vomiting--dizziness    Plan:    See above    Receiving one unit PRBCs    Consider dc Zosyn    TPN cont'd     BiPAP for respiratory

## 2024-07-02 NOTE — PROGRESS NOTES
Physical Therapy      Attempted to see patient this. Assisted nursing in rolling. Patient unable to actively participate in rolling. Unable to actively participate in any therapy at this time. Informed physician and nursing. Will attempt further therapy tomorrow 7/3/24.    Rodolfo Graham PTA

## 2024-07-02 NOTE — PLAN OF CARE
Problem: Discharge Planning  Goal: Discharge to home or other facility with appropriate resources  Outcome: Progressing  Flowsheets (Taken 7/1/2024 2015 by Sveta Moreno, RN)  Discharge to home or other facility with appropriate resources: Identify barriers to discharge with patient and caregiver     Problem: Pain  Goal: Verbalizes/displays adequate comfort level or baseline comfort level  Outcome: Progressing     Problem: ABCDS Injury Assessment  Goal: Absence of physical injury  Outcome: Progressing     Problem: Safety - Adult  Goal: Free from fall injury  Outcome: Progressing     Problem: Gastrointestinal - Adult  Goal: Minimal or absence of nausea and vomiting  Outcome: Progressing  Goal: Maintains or returns to baseline bowel function  Outcome: Progressing     Problem: Skin/Tissue Integrity - Adult  Goal: Skin integrity remains intact  Outcome: Progressing  Flowsheets (Taken 7/1/2024 2015 by Sveta Moreno, RN)  Skin Integrity Remains Intact: Monitor for areas of redness and/or skin breakdown  Goal: Incisions, wounds, or drain sites healing without S/S of infection  Outcome: Progressing  Flowsheets (Taken 7/1/2024 2015 by Sveta Moreno, RN)  Incisions, Wounds, or Drain Sites Healing Without Sign and Symptoms of Infection: TWICE DAILY: Assess and document dressing/incision, wound bed, drain sites and surrounding tissue  Goal: Oral mucous membranes remain intact  Outcome: Progressing     Problem: Musculoskeletal - Adult  Goal: Return mobility to safest level of function  Outcome: Progressing  Flowsheets (Taken 7/1/2024 2015 by Sveta Moreno, RN)  Return Mobility to Safest Level of Function: Assess patient stability and activity tolerance for standing, transferring and ambulating with or without assistive devices  Goal: Maintain proper alignment of affected body part  Outcome: Progressing  Flowsheets (Taken 7/1/2024 2015 by Sveta Moreno, RN)  Maintain proper alignment of affected body part: Instruct  symptoms are monitored and maintained or improved  Outcome: Progressing  Flowsheets (Taken 7/1/2024 2015 by Sveta Moreno, RN)  Care Plan - Patient's Chronic Conditions and Co-Morbidity Symptoms are Monitored and Maintained or Improved: Monitor and assess patient's chronic conditions and comorbid symptoms for stability, deterioration, or improvement

## 2024-07-02 NOTE — PROGRESS NOTES
rounding in PCU.    Assessment: Patient was sleeping.      07/02/24 1600   Encounter Summary   Encounter Overview/Reason Attempted Encounter   Service Provided For Patient   Referral/Consult From Rounding   Support System Unknown   Last Encounter  07/02/24   Complexity of Encounter Low   Begin Time 1345   End Time  1346   Total Time Calculated 1 min   Spiritual/Emotional needs   Type Spiritual Support   Assessment/Intervention/Outcome   Assessment Unable to assess  (Patient was sleeping.)   Intervention Other (Comment)  (Patient was sleeping.)   Outcome Other (comment)  (Patient was sleeping.)   Plan and Referrals   Does the patient have a [facility] PCP? Yes    to follow up after discharge? No         Plan: Chaplains are available on site or on call 24/7 for spiritual and emotional support.    Electronically signed by Astrid Yanez on 7/2/2024 at 4:01 PM

## 2024-07-02 NOTE — PROGRESS NOTES
Occupational Therapy  Facility/Department: [facility]  PROGRESSIVE CARE  Daily Treatment Note  NAME: Soumya Smith  : 3/14/1933  MRN: [id number]    Date of Service: 2024    Discharge Recommendations:  Subacute/Skilled Nursing Facility, Long Term Care with OT, Patient would benefit from continued therapy after discharge         Patient Diagnosis(es): The primary encounter diagnosis was Shortness of breath. A diagnosis of Malignant neoplasm of hepatic flexure (HCC) was also pertinent to this visit.     Assessment    Activity Tolerance: Patient limited by pain;Patient limited by fatigue;Patient limited by endurance  Discharge Recommendations: Subacute/Skilled Nursing Facility;Long Term Care with OT;Patient would benefit from continued therapy after discharge      Plan   Occupational Therapy Plan  Times Per Week: 3-5  Therapy Duration: 4-7 Days  Current Treatment Recommendations: Patient/Caregiver education & training;Equipment evaluation, education, & procurement;Functional mobility training;Self-Care / ADL     Restrictions       Subjective   Subjective  Subjective: Assisted nurse with patient care.  Orientation  Orientation Level: Oriented to person;Disoriented to situation  Pain: Abdominal pain, not rated  Cognition  Overall Cognitive Status: WFL  Cognition Comment: Minimal responses to verbal questions        Objective    Vitals  Vitals  Pulse: 98  BP: 114/63  MAP (Calculated): 80  BP Location: Left upper arm  Bed Mobility Training  Overall Level of Assistance: Maximum assistance;Assist X1  Rolling: Maximum assistance     ADL  UE Bathing: Maximum assistance;Dependent/Total  LE Bathing: Maximum assistance;Dependent/Total  UE Dressing: Maximum assistance;Dependent/Total  LE Dressing: Maximum assistance;Dependent/Total  Skin Care: Bath wipes  OT Exercises  Exercise Treatment: Patient refused PROM today with Max resistance.     Safety Devices  Type of Devices: Left in bed;Bed alarm in place;Call light within

## 2024-07-02 NOTE — PROGRESS NOTES
Physician Progress Note      PATIENT:               KRYSTAL CHAUDHARY  CSN #:                  [id number]  :                       3/14/1933  ADMIT DATE:       2024 7:53 AM  DISCH DATE:  RESPONDING  PROVIDER #:        Lester Lovett MD          QUERY TEXT:    Pt admitted with colon cancer and underwent right hemicolectomy on .     RN note states, \"RT called to patients room for low oxygen saturations.   Patient on 4L nasal cannula upon RT entering, SpO2 86%.  Patient getting   fluids and mouth-breathing.  Attempted to instruct patient to breath through   nose, but patient not able to follow commands and just states ok.  Placed   patient on simple mask starting at 8-10L, SpO2 88%. Increased to 13L, SpO2   88-90%. Placed patient on NRB mask due to not staying consistently at 90% on   simple mask.  While on NRB mask at 10L, patient SpO2 i    The medical record reflects the following:  Risk Factors: colon cancer and underwent right hemicolectomy, age 89, possible   PNA, CHF, COPD, former smoker  Clinical Indicators:  RN note states, \"RT called to patients room for low   oxygen saturations. Patient on 4L nasal cannula upon RT entering, SpO2 86%.    Patient getting fluids and mouth-breathing.  Attempted to instruct patient to   breath through nose, but patient not able to follow commands and just states   ok.  Placed patient on simple mask starting at 8-10L, SpO2 88%. Increased to   13L, SpO2 88-90%. Placed patient on NRB mask due to not staying consistently   at 90% on simple mask.  While on NRB mask at 10L, patient SpO2 increased to   93%.\" Respirations 20s/min; rales, rhonch  Treatment: BiPAP, IV Lasix/Bumex, IV ATBs, Duonebs, Dulera, labs, imaging    Thank you!    Jannet Granados, RN, BSN, RHIT, CCDS  Clinical   Options provided:  -- Acute pulmonary insufficiency following surgery  -- Acute respiratory failure unrelated to surgery and due to, Please

## 2024-07-02 NOTE — PROGRESS NOTES
General Surgery - Neri Paulino PA-C  Daily Progress Note  Pt Name: Soumya Smith  Medical Record Number: [id number]  Date of Birth 3/14/1933   Today's Date: 7/2/2024      SUBJECTIVE  Postop day 8 open right hemicolectomy  Easily arousable today, slightly better than yesterday  Tolerating bipap, ABG pending, last night continues to show metabolic acidosis    Cr slightly worsening with increase to 2.2     sig edema in abdomen and legs/anasarca    CINDI drain shows serous fluid, is getting a significant amount out, 1290 out yesterday (less than day prior and she did receive 2 liters of fluids yesterday)    Had bms on 6/30/24    OBJECTIVE  CURRENT VITALS /72   Pulse (!) 111   Temp 98.2 °F (36.8 °C) (Temporal)   Resp 21   Ht 1.626 m (5' 4\")   Wt 105.1 kg (231 lb 11.2 oz)   SpO2 95%   BMI 39.77 kg/m²   GENERAL: awakens easily and responds to questions, cooperative, no distress  LUNGS: on bipap right now, seems to be tolerating well, has rales and rhonchi throughout the lungs   HEART:  irregular irregular, and intact distal pulses  ABDOMEN: soft, midline incision we opened yesterday at area of erythema, that erythema is now resolved, appears clean, remainder of the incision has no dehiscence or signs of infection, CINDI has serous fluid in the bulb, BS are audible but hypoactive  EXTERMITY: 3-4+edema  24 HR INTAKE/OUTPUT :   Intake/Output Summary (Last 24 hours) at 7/2/2024 0822  Last data filed at 7/2/2024 0636  Gross per 24 hour   Intake 4463.98 ml   Output 2162 ml   Net 2301.98 ml     DRAIN/TUBE OUTPUT :  ,    Current Facility-Administered Medications   Medication Dose Route Frequency Provider Last Rate Last Admin    bumetanide (BUMEX) 0.25 MG/ML injection             bumetanide (BUMEX) 0.25 MG/ML injection             sodium chloride 0.9 % infusion             albumin human 25% IV solution 25 g  25 g IntraVENous Once Lamont Miranda MD        oxyCODONE (ROXICODONE) immediate release tablet 2.5 mg  2.5 mg  Lamont GREENE MD   Stopped at 07/02/24 0627    metoprolol tartrate (LOPRESSOR) tablet 25 mg  25 mg Oral BID Nany Evans APRN - CNP   25 mg at 07/02/24 0811    sodium chloride flush 0.9 % injection 10 mL  10 mL IntraVENous 2 times per day Hannah Jean Baptiste APRN - NP   10 mL at 07/01/24 2132    sodium chloride flush 0.9 % injection 10 mL  10 mL IntraVENous PRN Hannah Jean Baptiste APRN - NP   10 mL at 06/30/24 2223    0.9 % sodium chloride infusion   IntraVENous PRN Hannah Jean Baptiste APRN - NP   Stopped at 06/25/24 0159    ondansetron (ZOFRAN-ODT) disintegrating tablet 4 mg  4 mg Oral Q8H PRN Hannah Jean Baptiste APRN - NP   4 mg at 06/25/24 0959    Or    ondansetron (ZOFRAN) injection 4 mg  4 mg IntraVENous Q6H PRN Hannah Jean Baptiste APRN - JANET        HYDROmorphone (DILAUDID) injection 0.25 mg  0.25 mg IntraVENous Q3H PRN Hannah Jean Baptiste APRN - NP   0.25 mg at 07/01/24 0239    midodrine (PROAMATINE) tablet 10 mg  10 mg Oral TID WC Hannah Jean Baptiste APRN - NP   10 mg at 07/02/24 0811    miconazole (MICOTIN) 2 % powder   Topical BID Hannah Jean Baptiste APRN - NP   Given at 07/02/24 0817    albuterol (PROVENTIL) (2.5 MG/3ML) 0.083% nebulizer solution 2.5 mg  2.5 mg Nebulization As Directed RT PRN Hannah Jean Baptiste APRN - JANET        ipratropium 0.5 mg-albuterol 2.5 mg (DUONEB) nebulizer solution 1 Dose  1 Dose Inhalation Q4H WA RT Hannah Jean Baptiste APRN - NP   1 Dose at 07/01/24 2024    acetaminophen (TYLENOL) tablet 1,000 mg  1,000 mg Oral Q6H PRN Hannah Jean Baptiste APRN - NP        amiodarone (CORDARONE) tablet 200 mg  200 mg Oral Daily Hannah Jean Baptiste APRN - NP   200 mg at 07/02/24 0812    [Held by provider] atorvastatin (LIPITOR) tablet 40 mg  40 mg Oral Daily Hannah Jean Baptiste APRN - NP   40 mg at 06/27/24 0938    [Held by provider] docusate sodium (COLACE) capsule 100 mg  100 mg Oral Daily Hannah Jean Baptiste APRN - NP   100 mg at 06/27/24 0938    sertraline (ZOLOFT) tablet 100 mg  100 mg Oral Daily Hannah Jean Baptiste APRN - NP   100 mg at 07/02/24 0812    sucralfate

## 2024-07-02 NOTE — PROGRESS NOTES
Physician Progress Note      PATIENT:               KRYSTAL CHAUDHARY  [facility] #:                  [id number]  :                       3/14/1933  ADMIT DATE:       2024 7:53 AM  DISCH DATE:  RESPONDING  PROVIDER #:        Lester Lovett MD          QUERY TEXT:    Patient admitted with colon cancer.  Per dietitian, pt meets ASPEN criteria   for severe malnutrition.  If possible, please document in progress notes and   discharge summary if you are evaluating and /or treating any of the following:    The medical record reflects the following:  Risk Factors: colon cancer s/p right hemicolectomy, age 89, DM 2, COPD, CKD  Clinical Indicators: meets ASPEN criteria for severe malnutrition: 1) Energy   Intake: 50% or less of estimated energy requirements for 5 or more days,  2)   Fluid Accumulation: Moderate to Severe  Treatment: dietitian consult, albumin, Colace, magnesium and potassium   replacement, Protonix, Glycolax, Carafate, IVF, Zofran, labs    ASPEN Criteria:    https://aspenjournals.onlinelibrary.pack.com/doi/full/10.1177/679421699598191  5    Thank you!    Jannet Granados, RN, BSN, RHIT, CCDS  Clinical   Options provided:  -- Protein calorie malnutrition severe  -- Other - I will add my own diagnosis  -- Disagree - Not applicable / Not valid  -- Disagree - Clinically unable to determine / Unknown  -- Refer to Clinical Documentation Reviewer    PROVIDER RESPONSE TEXT:    This patient has severe protein calorie malnutrition.    Query created by: Jannet Granados on 2024 11:09 AM      Electronically signed by:  Lester Lovett MD 2024   10:07 AM

## 2024-07-03 ENCOUNTER — APPOINTMENT (OUTPATIENT)
Dept: GENERAL RADIOLOGY | Age: 89
End: 2024-07-03
Attending: FAMILY MEDICINE
Payer: MEDICARE

## 2024-07-03 LAB
ABO/RH: NORMAL
ALBUMIN SERPL-MCNC: 2.3 G/DL (ref 3.5–5.2)
ALBUMIN/GLOB SERPL: 1 {RATIO} (ref 1–2.5)
ALLEN TEST: POSITIVE
ALP SERPL-CCNC: 142 U/L (ref 35–104)
ALT SERPL-CCNC: 30 U/L (ref 5–33)
ANION GAP SERPL CALCULATED.3IONS-SCNC: 14 MMOL/L (ref 9–17)
ANTIBODY SCREEN: NEGATIVE
ARM BAND NUMBER: NORMAL
AST SERPL-CCNC: 79 U/L
BASOPHILS # BLD: 0.03 K/UL (ref 0–0.2)
BASOPHILS NFR BLD: 0 % (ref 0–2)
BILIRUB DIRECT SERPL-MCNC: 0.4 MG/DL
BILIRUB INDIRECT SERPL-MCNC: 0.2 MG/DL (ref 0–1)
BILIRUB SERPL-MCNC: 0.6 MG/DL (ref 0.3–1.2)
BLOOD BANK BLOOD PRODUCT EXPIRATION DATE: NORMAL
BLOOD BANK DISPENSE STATUS: NORMAL
BLOOD BANK ISBT PRODUCT BLOOD TYPE: 600
BLOOD BANK PRODUCT CODE: NORMAL
BLOOD BANK SAMPLE EXPIRATION: NORMAL
BLOOD BANK UNIT TYPE AND RH: NORMAL
BPU ID: NORMAL
BUN SERPL-MCNC: 59 MG/DL (ref 8–23)
BUN/CREAT SERPL: 23 (ref 9–20)
CALCIUM SERPL-MCNC: 8.1 MG/DL (ref 8.6–10.4)
CHLORIDE SERPL-SCNC: 105 MMOL/L (ref 98–107)
CO2 SERPL-SCNC: 18 MMOL/L (ref 20–31)
COMPONENT: NORMAL
CREAT SERPL-MCNC: 2.6 MG/DL (ref 0.5–0.9)
CROSSMATCH RESULT: NORMAL
EOSINOPHIL # BLD: 0.13 K/UL (ref 0–0.44)
EOSINOPHILS RELATIVE PERCENT: 1 % (ref 1–4)
ERYTHROCYTE [DISTWIDTH] IN BLOOD BY AUTOMATED COUNT: 17 % (ref 11.8–14.4)
FIO2: 40
GFR, ESTIMATED: 17 ML/MIN/1.73M2
GLOBULIN SER CALC-MCNC: 2.4 G/DL (ref 1.5–3.8)
GLUCOSE BLD-MCNC: 143 MG/DL (ref 65–105)
GLUCOSE SERPL-MCNC: 161 MG/DL (ref 70–99)
HCT VFR BLD AUTO: 27.6 % (ref 36.3–47.1)
HGB BLD-MCNC: 9.1 G/DL (ref 11.9–15.1)
IMM GRANULOCYTES # BLD AUTO: 0.08 K/UL (ref 0–0.3)
IMM GRANULOCYTES NFR BLD: 1 %
LYMPHOCYTES NFR BLD: 1.1 K/UL (ref 1.1–3.7)
LYMPHOCYTES RELATIVE PERCENT: 7 % (ref 24–43)
MAGNESIUM SERPL-MCNC: 1.9 MG/DL (ref 1.6–2.6)
MCH RBC QN AUTO: 28.5 PG (ref 25.2–33.5)
MCHC RBC AUTO-ENTMCNC: 33 G/DL (ref 25.2–33.5)
MCV RBC AUTO: 86.5 FL (ref 82.6–102.9)
MICROORGANISM SPEC CULT: NORMAL
MODE: ABNORMAL
MONOCYTES NFR BLD: 0.85 K/UL (ref 0.1–1.2)
MONOCYTES NFR BLD: 5 % (ref 3–12)
NEGATIVE BASE EXCESS, ART: 6.9 MMOL/L (ref 0–2)
NEUTROPHILS NFR BLD: 86 % (ref 36–65)
NEUTS SEG NFR BLD: 14.57 K/UL (ref 1.5–8.1)
NRBC BLD-RTO: 0 PER 100 WBC
O2 DELIVERY DEVICE: ABNORMAL
PLATELET # BLD AUTO: 342 K/UL (ref 138–453)
PMV BLD AUTO: 11.4 FL (ref 8.1–13.5)
POC HCO3: 19.2 MMOL/L (ref 21–28)
POC O2 SATURATION: 95.9 % (ref 94–98)
POC PCO2: 39.7 MM HG (ref 35–48)
POC PH: 7.29 (ref 7.35–7.45)
POC PO2: 90 MM HG (ref 83–108)
POTASSIUM SERPL-SCNC: 4.2 MMOL/L (ref 3.7–5.3)
PROT SERPL-MCNC: 4.7 G/DL (ref 6.4–8.3)
RBC # BLD AUTO: 3.19 M/UL (ref 3.95–5.11)
RBC # BLD: ABNORMAL 10*6/UL
SAMPLE SITE: ABNORMAL
SERVICE CMNT-IMP: NORMAL
SODIUM SERPL-SCNC: 137 MMOL/L (ref 135–144)
SPECIMEN DESCRIPTION: NORMAL
TRANSFUSION STATUS: NORMAL
UNIT DIVISION: 0
UNIT ISSUE DATE/TIME: NORMAL
WBC OTHER # BLD: 16.8 K/UL (ref 3.5–11.3)

## 2024-07-03 PROCEDURE — 83735 ASSAY OF MAGNESIUM: CPT

## 2024-07-03 PROCEDURE — 2500000003 HC RX 250 WO HCPCS: Performed by: FAMILY MEDICINE

## 2024-07-03 PROCEDURE — 2000000000 HC ICU R&B

## 2024-07-03 PROCEDURE — 82803 BLOOD GASES ANY COMBINATION: CPT

## 2024-07-03 PROCEDURE — 2580000003 HC RX 258: Performed by: INTERNAL MEDICINE

## 2024-07-03 PROCEDURE — 80076 HEPATIC FUNCTION PANEL: CPT

## 2024-07-03 PROCEDURE — 94761 N-INVAS EAR/PLS OXIMETRY MLT: CPT

## 2024-07-03 PROCEDURE — 94640 AIRWAY INHALATION TREATMENT: CPT

## 2024-07-03 PROCEDURE — 6360000002 HC RX W HCPCS: Performed by: PHYSICIAN ASSISTANT

## 2024-07-03 PROCEDURE — 85025 COMPLETE CBC W/AUTO DIFF WBC: CPT

## 2024-07-03 PROCEDURE — 2580000003 HC RX 258

## 2024-07-03 PROCEDURE — 6360000002 HC RX W HCPCS: Performed by: FAMILY MEDICINE

## 2024-07-03 PROCEDURE — 6370000000 HC RX 637 (ALT 250 FOR IP)

## 2024-07-03 PROCEDURE — 2580000003 HC RX 258: Performed by: PHYSICIAN ASSISTANT

## 2024-07-03 PROCEDURE — 6360000002 HC RX W HCPCS: Performed by: INTERNAL MEDICINE

## 2024-07-03 PROCEDURE — 6360000002 HC RX W HCPCS

## 2024-07-03 PROCEDURE — 36415 COLL VENOUS BLD VENIPUNCTURE: CPT

## 2024-07-03 PROCEDURE — 80048 BASIC METABOLIC PNL TOTAL CA: CPT

## 2024-07-03 PROCEDURE — 82947 ASSAY GLUCOSE BLOOD QUANT: CPT

## 2024-07-03 PROCEDURE — 71045 X-RAY EXAM CHEST 1 VIEW: CPT

## 2024-07-03 PROCEDURE — 2700000000 HC OXYGEN THERAPY PER DAY

## 2024-07-03 PROCEDURE — 2500000003 HC RX 250 WO HCPCS: Performed by: INTERNAL MEDICINE

## 2024-07-03 PROCEDURE — 94660 CPAP INITIATION&MGMT: CPT

## 2024-07-03 PROCEDURE — 6360000002 HC RX W HCPCS: Performed by: NURSE PRACTITIONER

## 2024-07-03 PROCEDURE — 99232 SBSQ HOSP IP/OBS MODERATE 35: CPT | Performed by: FAMILY MEDICINE

## 2024-07-03 PROCEDURE — 99024 POSTOP FOLLOW-UP VISIT: CPT | Performed by: PHYSICIAN ASSISTANT

## 2024-07-03 RX ADMIN — PIPERACILLIN AND TAZOBACTAM 3375 MG: 3; .375 INJECTION, POWDER, LYOPHILIZED, FOR SOLUTION INTRAVENOUS at 09:05

## 2024-07-03 RX ADMIN — SODIUM CHLORIDE, PRESERVATIVE FREE 40 MG: 5 INJECTION INTRAVENOUS at 09:07

## 2024-07-03 RX ADMIN — Medication: at 10:20

## 2024-07-03 RX ADMIN — ENOXAPARIN SODIUM 30 MG: 100 INJECTION SUBCUTANEOUS at 09:15

## 2024-07-03 RX ADMIN — IPRATROPIUM BROMIDE AND ALBUTEROL SULFATE 1 DOSE: 2.5; .5 SOLUTION RESPIRATORY (INHALATION) at 20:02

## 2024-07-03 RX ADMIN — ASCORBIC ACID, VITAMIN A PALMITATE, CHOLECALCIFEROL, THIAMINE HYDROCHLORIDE, RIBOFLAVIN-5 PHOSPHATE SODIUM, PYRIDOXINE HYDROCHLORIDE, NIACINAMIDE, DEXPANTHENOL, ALPHA-TOCOPHEROL ACETATE, VITAMIN K1, FOLIC ACID, BIOTIN, CYANOCOBALAMIN: 200; 3300; 200; 6; 3.6; 6; 40; 15; 10; 150; 600; 60; 5 INJECTION, SOLUTION INTRAVENOUS at 17:32

## 2024-07-03 RX ADMIN — SODIUM CHLORIDE, PRESERVATIVE FREE 10 ML: 5 INJECTION INTRAVENOUS at 09:18

## 2024-07-03 RX ADMIN — BUMETANIDE 1 MG/HR: 0.25 INJECTION INTRAMUSCULAR; INTRAVENOUS at 05:58

## 2024-07-03 RX ADMIN — HYDROMORPHONE HYDROCHLORIDE 0.25 MG: 1 INJECTION, SOLUTION INTRAMUSCULAR; INTRAVENOUS; SUBCUTANEOUS at 23:48

## 2024-07-03 RX ADMIN — HYDROMORPHONE HYDROCHLORIDE 0.25 MG: 1 INJECTION, SOLUTION INTRAMUSCULAR; INTRAVENOUS; SUBCUTANEOUS at 10:31

## 2024-07-03 RX ADMIN — IPRATROPIUM BROMIDE AND ALBUTEROL SULFATE 1 DOSE: 2.5; .5 SOLUTION RESPIRATORY (INHALATION) at 11:37

## 2024-07-03 RX ADMIN — HYDROMORPHONE HYDROCHLORIDE 0.25 MG: 1 INJECTION, SOLUTION INTRAMUSCULAR; INTRAVENOUS; SUBCUTANEOUS at 17:20

## 2024-07-03 RX ADMIN — SODIUM CHLORIDE, PRESERVATIVE FREE 10 ML: 5 INJECTION INTRAVENOUS at 20:46

## 2024-07-03 RX ADMIN — MICONAZOLE NITRATE: 20 POWDER TOPICAL at 09:17

## 2024-07-03 RX ADMIN — IPRATROPIUM BROMIDE AND ALBUTEROL SULFATE 1 DOSE: 2.5; .5 SOLUTION RESPIRATORY (INHALATION) at 07:46

## 2024-07-03 RX ADMIN — POTASSIUM CHLORIDE AND SODIUM CHLORIDE: 900; 150 INJECTION, SOLUTION INTRAVENOUS at 01:39

## 2024-07-03 RX ADMIN — I.V. FAT EMULSION 250 ML: 20 EMULSION INTRAVENOUS at 17:33

## 2024-07-03 RX ADMIN — SUCRALFATE 1 G: 1 TABLET ORAL at 06:00

## 2024-07-03 RX ADMIN — IPRATROPIUM BROMIDE AND ALBUTEROL SULFATE 1 DOSE: 2.5; .5 SOLUTION RESPIRATORY (INHALATION) at 15:30

## 2024-07-03 RX ADMIN — MICONAZOLE NITRATE: 20 POWDER TOPICAL at 20:46

## 2024-07-03 ASSESSMENT — PAIN DESCRIPTION - DESCRIPTORS
DESCRIPTORS: OTHER (COMMENT)
DESCRIPTORS: SORE
DESCRIPTORS: OTHER (COMMENT)

## 2024-07-03 ASSESSMENT — PAIN - FUNCTIONAL ASSESSMENT
PAIN_FUNCTIONAL_ASSESSMENT: PREVENTS OR INTERFERES WITH ALL ACTIVE AND SOME PASSIVE ACTIVITIES

## 2024-07-03 ASSESSMENT — PAIN SCALES - WONG BAKER
WONGBAKER_NUMERICALRESPONSE: HURTS A LITTLE BIT
WONGBAKER_NUMERICALRESPONSE: HURTS EVEN MORE
WONGBAKER_NUMERICALRESPONSE: NO HURT
WONGBAKER_NUMERICALRESPONSE: HURTS A LITTLE BIT
WONGBAKER_NUMERICALRESPONSE: NO HURT
WONGBAKER_NUMERICALRESPONSE: HURTS A LITTLE BIT
WONGBAKER_NUMERICALRESPONSE: HURTS A LITTLE BIT

## 2024-07-03 ASSESSMENT — PAIN DESCRIPTION - LOCATION
LOCATION: GENERALIZED
LOCATION: ABDOMEN;GENERALIZED
LOCATION: ABDOMEN;GENERALIZED

## 2024-07-03 ASSESSMENT — PAIN SCALES - GENERAL
PAINLEVEL_OUTOF10: 6
PAINLEVEL_OUTOF10: 6
PAINLEVEL_OUTOF10: 5

## 2024-07-03 NOTE — PROGRESS NOTES
3.85 4.11   58 - 61 2.10 2.28 2.46 2.65 2.84 3.04 3.24 3.45 58 - 61 2.32 2.54 2.76 2.99 3.23 3.47 3.72 3.98   62 - 65 1.99 2.17 2.35 2.54 2.73 2.93 3.13 3.34 62 - 65 2.19 2.40 2.62 2.85 3.09 3.33 3.58 3.84   66 - 69 1.88 2.05 2.23 2.42 2.61 2.81 3.02 3.23 66 - 69 2.04 2.26 2.48 2.71 2.95 3.19 3.44 3.70   70+ 1.82 1.99 2.17 2.36 2.55 2.75 2.95 3.16 70+ 1.97 2.19 2.41 2.64 2.87 3.12 3.37 3.62             Predicted Peak Expiratory Flow Rate                                       Height (in)  Female       Height (in) Male           Age 56 58 60 62 64 66 68 70 Age            20 344 357 372 387 [phone] 446  60 62 64 66 68 70 72 74 76   25 337 352 366 381 396 411 426 441 25 447 476 505 533 562 [phone]   30 329 344 359 374 389 404 419 434 30 437 466 494 523 552 [phone] 667   35 322 337 351 366 381 396 411 426 35 426 455 484 512 541 [phone]   40 314 329 344 359 374 389 404 419 40 416 445 473 [phone] 647   45 307 322 336 351 366 381 396 411 45 405 434 463 491 520 [phone] 636   50 299 314 329 344 359 374 389 404 50 395 424 452 481 510 [phone] 625   55 292 307 321 336 351 366 381 396 55 384 413 442 470 499 [phone] 615   60 284 299 314 329 344 359 374 389 60 374 403 431 460 489 517 546 575 605   65 277 292 306 321 336 351 366 381 65 363 392 421 449 478 507 535 564 594   70 269 284 299 314 329 344 359 374 70 353 382 410 439 468 [phone] 583   75 261 274 289 305 319 334 348 364 75 344 372 400 429 458 487 515 544 573   80 253 266 282 296 312 327 342 356 80 335 362 390 419 448 476 [phone]

## 2024-07-03 NOTE — PLAN OF CARE
Problem: Discharge Planning  Goal: Discharge to home or other facility with appropriate resources  7/3/2024 0729 by Kee Thacker RN  Outcome: Progressing  7/2/2024 2107 by aMnsi Luna RN  Outcome: Progressing  Flowsheets (Taken 7/2/2024 2000)  Discharge to home or other facility with appropriate resources:   Identify barriers to discharge with patient and caregiver   Refer to discharge planning if patient needs post-hospital services based on physician order or complex needs related to functional status, cognitive ability or social support system     Problem: Pain  Goal: Verbalizes/displays adequate comfort level or baseline comfort level  7/3/2024 0729 by Kee Thacker RN  Outcome: Progressing  7/2/2024 2107 by Mansi Luna RN  Outcome: Progressing     Problem: ABCDS Injury Assessment  Goal: Absence of physical injury  7/3/2024 0729 by Kee Thacker RN  Outcome: Progressing  7/2/2024 2107 by Mansi Luna RN  Outcome: Progressing     Problem: Safety - Adult  Goal: Free from fall injury  7/3/2024 0729 by Kee Thacker RN  Outcome: Progressing  7/2/2024 2107 by Mansi Luna RN  Outcome: Progressing     Problem: Gastrointestinal - Adult  Goal: Minimal or absence of nausea and vomiting  7/3/2024 0729 by Kee Thacker RN  Outcome: Progressing  7/2/2024 2107 by Mansi Luna RN  Outcome: Progressing  Flowsheets (Taken 7/2/2024 2000)  Minimal or absence of nausea and vomiting:   Administer IV fluids as ordered to ensure adequate hydration   Administer ordered antiemetic medications as needed   Advance diet as tolerated, if ordered  Goal: Maintains or returns to baseline bowel function  7/3/2024 0729 by Kee Thacker RN  Outcome: Progressing  7/2/2024 2107 by Mansi Luna RN  Outcome: Progressing  Flowsheets (Taken 7/2/2024 2000)  Maintains or returns to baseline bowel function:   Assess bowel function   Encourage oral fluids to ensure adequate hydration   Administer  IV fluids as ordered to ensure adequate hydration   Administer ordered medications as needed     Problem: Skin/Tissue Integrity - Adult  Goal: Skin integrity remains intact  7/3/2024 0729 by Kee Thacker RN  Outcome: Progressing  Flowsheets (Taken 7/2/2024 2108 by Mansi Luna RN)  Skin Integrity Remains Intact: Monitor for areas of redness and/or skin breakdown  7/2/2024 2107 by Mansi Luna RN  Outcome: Progressing  Flowsheets (Taken 7/2/2024 2000)  Skin Integrity Remains Intact: Monitor for areas of redness and/or skin breakdown  Goal: Incisions, wounds, or drain sites healing without S/S of infection  7/3/2024 0729 by Kee Thacker RN  Outcome: Progressing  Flowsheets (Taken 7/2/2024 2108 by Mansi Luna RN)  Incisions, Wounds, or Drain Sites Healing Without Sign and Symptoms of Infection: TWICE DAILY: Assess and document dressing/incision, wound bed, drain sites and surrounding tissue  7/2/2024 2107 by Mansi Luna RN  Outcome: Progressing  Flowsheets (Taken 7/2/2024 2000)  Incisions, Wounds, or Drain Sites Healing Without Sign and Symptoms of Infection: TWICE DAILY: Assess and document dressing/incision, wound bed, drain sites and surrounding tissue  Goal: Oral mucous membranes remain intact  7/3/2024 0729 by Kee Thacker RN  Outcome: Progressing  7/2/2024 2107 by Mansi Luna RN  Outcome: Progressing  Flowsheets (Taken 7/2/2024 2000)  Oral Mucous Membranes Remain Intact: Assess oral mucosa and hygiene practices     Problem: Musculoskeletal - Adult  Goal: Return mobility to safest level of function  7/3/2024 0729 by Kee Thacker RN  Outcome: Progressing  7/2/2024 2107 by Mansi Luna RN  Outcome: Progressing  Flowsheets (Taken 7/2/2024 2000)  Return Mobility to Safest Level of Function: Assess patient stability and activity tolerance for standing, transferring and ambulating with or without assistive devices  Goal: Maintain proper alignment of affected body

## 2024-07-03 NOTE — PROGRESS NOTES
07/03/24 1139   Encounter Summary   Encounter Overview/Reason Spiritual/Emotional Needs   Service Provided For Patient   Referral/Consult From Rounding   Support System Family members   Last Encounter  07/03/24   Complexity of Encounter Moderate   Begin Time 1118   End Time  1140   Total Time Calculated 22 min   Spiritual/Emotional needs   Type Spiritual Support   Assessment/Intervention/Outcome   Assessment Calm;Coping   Intervention Active listening;Prayer (assurance of)/Howe;Sustaining Presence/Ministry of presence   Outcome Comfort;Engaged in conversation;Expressed Gratitude

## 2024-07-03 NOTE — PROGRESS NOTES
Comprehensive Nutrition Assessment    Type and Reason for Visit:  Reassess    Nutrition Recommendations/Plan:   TPN via PICC: Clinimix Adult Premix 4.25/10 at 75 ml/hr.  Cont lipids: 250 ml 20% over 12 hrs.  Cont PO intake as tolerated.  Monitor and follow.     Malnutrition Assessment:  Malnutrition Status:  Severe malnutrition (r/t decreased intakes and sever fluid accumulation) (06/28/24 1039)    Context:  Acute Illness     Findings of the 6 clinical characteristics of malnutrition:  Energy Intake:  50% or less of estimated energy requirements for 5 or more days  Weight Loss:  Unable to assess     Body Fat Loss:  Unable to assess     Muscle Mass Loss:  Unable to assess    Fluid Accumulation:  Moderate to Severe     Strength:       Nutrition Assessment:    POD # 9 partial colectomy. Pt not improving with regard to alertness and PO intake. Only 60 ml was taken PO with meds yesterday. Minimally responsive. Diet downgraded to pureed yesterday. Pt nourished via parenteral nutrition at this time. TPN Premix 4.25/10 continues @ 75 ml/hr. Pt tolerating TPN well. Pt received 1608 ml TPN on 7/2 and 2157 ml TPN on 7/1. Severe, increasing edema/anascara remains in extremities, abdomen. Current net I/O is +19,182 ml with net weight gain ~44# at this point. Renal functions continues to deteriorate, though urine output has increased and drain output has decreased. Albumin and Hgb remain low despite replacement products. Active/hypoactive BS noted. LBM 6/29.    Nutrition Related Findings:    Meds/labs reviewed. IVF reduced to 50 ml/hr. Renal labs worsening. Hgb 9.1. Alb 2.3. Glu 129-167 past 24 hrs. Wound Type:  (abdominal)       Current Nutrition Intake & Therapies:    Average Meal Intake: 0%  Average Supplements Intake: None Ordered  ADULT DIET; Dysphagia - Pureed  PN-Adult Premix  4.25/10 - Standard Electrolytes - Peripheral Line  PN-Adult Premix  4.25/10 - Standard Electrolytes - Peripheral Line  Current Parenteral  Nutrition Orders:  Type and Formula: Premix Central   Lipids: 250ml (21 ml/hr x 12 hrs)  Duration: Continuous  Rate/Volume: 75ml/hr PreMix = 1800 ml  Current PN Order Provides: 1800ml = 1380 kcal, 73g AA, 173g dextrose, 55g lipid  Goal PN Orders Provides: At goal    Anthropometric Measures:  Height: 162.6 cm (5' 4\")  Ideal Body Weight (IBW): 120 lbs (55 kg)    Admission Body Weight: 86.5 kg (190 lb 11.2 oz)  Current Body Weight: 103.1 kg (227 lb 4.8 oz), 189.4 % IBW. Weight Source: Bed Scale  Current BMI (kg/m2): 39  Usual Body Weight:  (UTD)     Weight Adjustment For: No Adjustment                 BMI Categories: Obese Class 2 (BMI 35.0 -39.9)    Estimated Daily Nutrient Needs:  Energy Requirements Based On: Kcal/kg  Weight Used for Energy Requirements: Admission  Energy (kcal/day): 6041-4211  Weight Used for Protein Requirements: Ideal  Protein (g/day): 60-70     Fluid (ml/day): Per physician    Nutrition Diagnosis:   Severe malnutrition related to acute injury/trauma, altered GI function, altered GI structure as evidenced by Criteria as identified in malnutrition assessment    Nutrition Interventions:   Food and/or Nutrient Delivery: Continue Current Parenteral Nutrition  Nutrition Education/Counseling: No recommendation at this time  Coordination of Nutrition Care: Continue to monitor while inpatient  Plan of Care discussed with: Family    Goals:  Previous Goal Met: No Progress toward Goal(s)  Goals: PO intake 50% or greater, prior to discharge       Nutrition Monitoring and Evaluation:   Behavioral-Environmental Outcomes: None Identified  Food/Nutrient Intake Outcomes: IVF Intake, Parenteral Nutrition Intake/Tolerance, Food and Nutrient Intake  Physical Signs/Symptoms Outcomes: Biochemical Data, GI Status, Fluid Status or Edema, Nausea or Vomiting, Hemodynamic Status, Skin, Weight    Discharge Planning:    Too soon to determine     AVTAR JOHNSON RD  Contact: [phone]

## 2024-07-03 NOTE — PROGRESS NOTES
Hospitalist Progress Note  7/3/2024 11:12 AM  Subjective:   Admit Date: 6/23/2024  PCP: Jannet Wallace, APRN - CNP    Interval History: Patient is s/p right hemicolectomy post op day#9 remains on Bipap has increased peripheral edema with  seepage in certain areas.Is on IV fluids and Bumex drip Urine output is improved is on TPN and bicarb drip.Received  a unit of blood yesterday.CXR is stable.Creatinine at 2.6.    Diet: ADULT DIET; Dysphagia - Pureed  PN-Adult Premix  4.25/10 - Standard Electrolytes - Peripheral Line  Medications:   Scheduled Meds:   sodium chloride  1 g Oral TID WC    sodium chloride  500 mL IntraVENous Once    enoxaparin  30 mg SubCUTAneous Daily    sodium chloride flush  5-40 mL IntraVENous 2 times per day    pantoprazole (PROTONIX) 40 mg in sodium chloride (PF) 0.9 % 10 mL injection  40 mg IntraVENous Daily    fat emulsion  250 mL IntraVENous Daily    metoprolol tartrate  25 mg Oral BID    sodium chloride flush  10 mL IntraVENous 2 times per day    midodrine  10 mg Oral TID WC    miconazole   Topical BID    ipratropium 0.5 mg-albuterol 2.5 mg  1 Dose Inhalation Q4H WA RT    amiodarone  200 mg Oral Daily    [Held by provider] atorvastatin  40 mg Oral Daily    [Held by provider] docusate sodium  100 mg Oral Daily    sertraline  100 mg Oral Daily    sucralfate  1 g Oral TID AC    mometasone-formoterol  2 puff Inhalation BID RT    tiotropium  2 puff Inhalation Daily RT     Continuous Infusions:   sodium chloride      PN-Adult Premix  4.25/10 - Standard Electrolytes - Peripheral Line 75 mL/hr at 07/03/24 0613    sodium bicarbonate 150 mEq in sodium chloride 0.9 % 1,000 mL infusion 50 mL/hr at 07/03/24 1020    bumetanide (BUMEX) 12.5 mg in sodium chloride 0.9 % 125 mL infusion 0.5 mg/hr (07/03/24 1021)    sodium chloride      0.9% NaCl with KCl 20 mEq 19 mL/hr at 07/03/24 0613    sodium chloride      sodium chloride Stopped (06/25/24 0159)     CBC:   Recent Labs     07/02/24  1458 07/02/24  4713

## 2024-07-03 NOTE — PROGRESS NOTES
Physical Therapy  Attempted to see patient this date. Held therapy per nursing. Nursing states will DC in in next 24hrs if condition does not improve.     Rodolfo Graham PTA

## 2024-07-03 NOTE — PROGRESS NOTES
General Surgery - Neri Paulino PA-C  Daily Progress Note  Pt Name: Soumya Smith  Medical Record Number: [id number]  Date of Birth 3/14/1933   Today's Date: 7/3/2024      SUBJECTIVE  Postop day 9 open right hemicolectomy  is arousable but not as energetic today  ,  Tolerating bipap, ABG shows some improvement in bicarb and pH,     Cr continues to slowly worsen now up to 2.6 from 2.2 yesterday morning     Edema worsening of the arms and body    CINDI drain shows serous fluid    Does seem to be in more pain today with movements    Had bms on 6/30/24    OBJECTIVE  CURRENT VITALS /75   Pulse 93   Temp 98 °F (36.7 °C) (Tympanic)   Resp 21   Ht 1.626 m (5' 4\")   Wt 106.5 kg (234 lb 14.4 oz)   SpO2 97%   BMI 40.32 kg/m²   GENERAL: awakens easily and responds to questions but not able to understand her answers, mild distress  LUNGS: on bipap right now, seems to be tolerating, has rales and rhonchi throughout the lungs   HEART:  irregular irregular  ABDOMEN: soft, midline incision looks good, open part is packed, no surrounding erythema, does leak serous fluid, CINDI has serous fluid in the bulb there is some leakage around it, BS are audible but hypoactive, has pitting edema of the abdomen  EXTERMITY: 4+edema of legs and arms    24 HR INTAKE/OUTPUT :   Intake/Output Summary (Last 24 hours) at 7/3/2024 1046  Last data filed at 7/3/2024 1022  Gross per 24 hour   Intake 4727.64 ml   Output 3292 ml   Net 1435.64 ml     DRAIN/TUBE OUTPUT :  ,    Current Facility-Administered Medications   Medication Dose Route Frequency Provider Last Rate Last Admin    0.9 % sodium chloride infusion   IntraVENous PRN Neri Paulino PA-C        PN-Adult Premix  4.25/10 - Standard Electrolytes - Peripheral Line   IntraVENous Continuous TPN Lamont Galvez MD 75 mL/hr at 07/03/24 0613 Rate Verify at 07/03/24 0613    oxyCODONE (ROXICODONE) immediate release tablet 2.5 mg  2.5 mg Oral Q4H PRN Nany Evans, APRLUANNE - CNP           Component Value Date/Time    WBC 16.8 07/03/2024 05:55 AM    HGB 9.1 07/03/2024 05:55 AM    HCT 27.6 07/03/2024 05:55 AM     07/03/2024 05:55 AM     BMP:   Lab Results   Component Value Date/Time     07/03/2024 07:43 AM    K 4.2 07/03/2024 07:43 AM     07/03/2024 07:43 AM    CO2 18 07/03/2024 07:43 AM    BUN 59 07/03/2024 07:43 AM    CREATININE 2.6 07/03/2024 07:43 AM    MG 1.9 07/03/2024 07:43 AM    PHOS 2.7 06/28/2024 05:37 AM       ASSESSMENT    ICD-10-CM    1. Shortness of breath  R06.02 Echo (TTE) complete (PRN contrast/bubble/strain/3D)     Echo (TTE) complete (PRN contrast/bubble/strain/3D)      2. Malignant neoplasm of hepatic flexure (HCC)  C18.3 Surgical Pathology     Surgical Pathology         Patient is a 89-year-old female who is postop day 9 from right hemicolectomy for colon cancer, we have had progressive slow worsening of the kidneys function with creatinine up to 2.6 now however she does have good urine output with 2 L yesterday, we have been giving her TPN have given her a round of albumin and a unit of blood and attempts to help increase her intravascular volume, her albumin and protein levels are not increasing like we would expect them to and she is still third spacing significantly, we had a long discussion with the family today and with the staff about goals of care and further plan, family would like to make her DNR CC, we would like to continue IV fluids, will decrease the Bumex to half per hour and monitor that urine output, will continue the TPN and watch her for the next 24 to 48 hours and reassess at that time.  We did suggest to the family speaking with hospice just to get more information at this time but they were not ready to do so yet.  If kidney show worsening and there is decreased output will have further conversation about the likelihood of the patient not making through this recovery period.      PLAN  1. continue TPN and IVF  2. Respiratory therapy,

## 2024-07-03 NOTE — PROGRESS NOTES
rounding on PCU    Assessment: Patient is returned to PCU following surgery and difficult partial recovery and discharge 6/28/24. Patient continues to be minimally responsive. Family, daughter Yaima, is present for support. They appear to understand the gravity of her condition.. Larger family is not present at this time.    Intervention: Engaged in conversation with daughter and her .  prayed close to patient with no response from her.Family expressed appreciation for visit and offer of continued prayer.   reviewed current admission with nurse, Nany, and Nurse supervisor to better understand clinical aspects. Dr. Miranda has met with family and discussed current treatment plan. Continue to monitor for 24 to 48 hours and adjust medications to determine kidney ability to function naturally.    Plan: Chaplains are available on site or on call 24/7 for spiritual and emotional support.

## 2024-07-03 NOTE — PLAN OF CARE
Problem: Discharge Planning  Goal: Discharge to home or other facility with appropriate resources  7/2/2024 2107 by Mansi Luna RN  Outcome: Progressing  Flowsheets (Taken 7/2/2024 2000)  Discharge to home or other facility with appropriate resources:   Identify barriers to discharge with patient and caregiver   Refer to discharge planning if patient needs post-hospital services based on physician order or complex needs related to functional status, cognitive ability or social support system  7/2/2024 0724 by Kee Thackre RN  Outcome: Progressing  Flowsheets (Taken 7/1/2024 2015 by Sveta Moreno, RN)  Discharge to home or other facility with appropriate resources: Identify barriers to discharge with patient and caregiver     Problem: Pain  Goal: Verbalizes/displays adequate comfort level or baseline comfort level  7/2/2024 2107 by Mansi Luna RN  Outcome: Progressing  7/2/2024 0724 by Kee Thacker RN  Outcome: Progressing     Problem: ABCDS Injury Assessment  Goal: Absence of physical injury  7/2/2024 2107 by Mansi Luna RN  Outcome: Progressing  7/2/2024 0724 by Kee Thacker RN  Outcome: Progressing     Problem: Safety - Adult  Goal: Free from fall injury  7/2/2024 2107 by Mansi Luna RN  Outcome: Progressing  7/2/2024 0724 by Kee Thacker RN  Outcome: Progressing     Problem: Gastrointestinal - Adult  Goal: Minimal or absence of nausea and vomiting  7/2/2024 2107 by Mansi Luna RN  Outcome: Progressing  Flowsheets (Taken 7/2/2024 2000)  Minimal or absence of nausea and vomiting:   Administer IV fluids as ordered to ensure adequate hydration   Administer ordered antiemetic medications as needed   Advance diet as tolerated, if ordered  7/2/2024 0724 by Kee Thacker RN  Outcome: Progressing  Goal: Maintains or returns to baseline bowel function  7/2/2024 2107 by Masni Luna RN  Outcome: Progressing  Flowsheets (Taken 7/2/2024 2000)  Maintains or returns  Cardiovascular - Adult  Goal: Maintains optimal cardiac output and hemodynamic stability  7/2/2024 2107 by Mansi Luna RN  Outcome: Progressing  Flowsheets (Taken 7/2/2024 2000)  Maintains optimal cardiac output and hemodynamic stability:   Monitor blood pressure and heart rate   Monitor urine output and notify Licensed Independent Practitioner for values outside of normal range   Assess for signs of decreased cardiac output  7/2/2024 0724 by Kee Thacker RN  Outcome: Progressing  Goal: Absence of cardiac dysrhythmias or at baseline  7/2/2024 2107 by Mansi Luna RN  Outcome: Progressing  Flowsheets (Taken 7/2/2024 2000)  Absence of cardiac dysrhythmias or at baseline:   Monitor cardiac rate and rhythm   Assess for signs of decreased cardiac output   Administer antiarrhythmia medication and electrolyte replacement as ordered  7/2/2024 0724 by Kee Thacker RN  Outcome: Progressing     Problem: Nutrition Deficit:  Goal: Optimize nutritional status  7/2/2024 2107 by Mansi Luna RN  Outcome: Progressing  7/2/2024 0724 by Kee Thacker RN  Outcome: Progressing     Problem: Confusion  Goal: Confusion, delirium, dementia, or psychosis is improved or at baseline  Description: INTERVENTIONS:  1. Assess for possible contributors to thought disturbance, including medications, impaired vision or hearing, underlying metabolic abnormalities, dehydration, psychiatric diagnoses, and notify attending LIP  2. [place] high risk fall precautions, as indicated  3. Provide frequent short contacts to provide reality reorientation, refocusing and direction  4. Decrease environmental stimuli, including noise as appropriate  5. Monitor and intervene to maintain adequate nutrition, hydration, elimination, sleep and activity  6. If unable to ensure safety without constant attention obtain sitter and review sitter guidelines with assigned personnel  7. Initiate Psychosocial CNS and Spiritual Care consult, as

## 2024-07-04 ENCOUNTER — APPOINTMENT (OUTPATIENT)
Dept: GENERAL RADIOLOGY | Age: 89
End: 2024-07-04
Attending: FAMILY MEDICINE
Payer: MEDICARE

## 2024-07-04 VITALS
OXYGEN SATURATION: 83 % | HEIGHT: 64 IN | TEMPERATURE: 99.5 F | HEART RATE: 72 BPM | BODY MASS INDEX: 40.34 KG/M2 | WEIGHT: 236.3 LBS | DIASTOLIC BLOOD PRESSURE: 34 MMHG | SYSTOLIC BLOOD PRESSURE: 42 MMHG | RESPIRATION RATE: 18 BRPM

## 2024-07-04 LAB
ANION GAP SERPL CALCULATED.3IONS-SCNC: 17 MMOL/L (ref 9–17)
BASOPHILS # BLD: <0.03 K/UL (ref 0–0.2)
BASOPHILS NFR BLD: 0 % (ref 0–2)
BUN SERPL-MCNC: 76 MG/DL (ref 8–23)
BUN/CREAT SERPL: 25 (ref 9–20)
CALCIUM SERPL-MCNC: 7.9 MG/DL (ref 8.6–10.4)
CHLORIDE SERPL-SCNC: 106 MMOL/L (ref 98–107)
CO2 SERPL-SCNC: 17 MMOL/L (ref 20–31)
CREAT SERPL-MCNC: 3 MG/DL (ref 0.5–0.9)
EOSINOPHIL # BLD: <0.03 K/UL (ref 0–0.44)
EOSINOPHILS RELATIVE PERCENT: 0 % (ref 1–4)
ERYTHROCYTE [DISTWIDTH] IN BLOOD BY AUTOMATED COUNT: 16.5 % (ref 11.8–14.4)
GFR, ESTIMATED: 14 ML/MIN/1.73M2
GLUCOSE BLD-MCNC: 169 MG/DL (ref 65–105)
GLUCOSE BLD-MCNC: 221 MG/DL (ref 65–105)
GLUCOSE SERPL-MCNC: 204 MG/DL (ref 70–99)
HCT VFR BLD AUTO: 29.1 % (ref 36.3–47.1)
HGB BLD-MCNC: 9.3 G/DL (ref 11.9–15.1)
IMM GRANULOCYTES # BLD AUTO: 0.15 K/UL (ref 0–0.3)
IMM GRANULOCYTES NFR BLD: 1 %
LYMPHOCYTES NFR BLD: 1.15 K/UL (ref 1.1–3.7)
LYMPHOCYTES RELATIVE PERCENT: 6 % (ref 24–43)
MAGNESIUM SERPL-MCNC: 2 MG/DL (ref 1.6–2.6)
MCH RBC QN AUTO: 27.8 PG (ref 25.2–33.5)
MCHC RBC AUTO-ENTMCNC: 32 G/DL (ref 25.2–33.5)
MCV RBC AUTO: 87.1 FL (ref 82.6–102.9)
MONOCYTES NFR BLD: 1.26 K/UL (ref 0.1–1.2)
MONOCYTES NFR BLD: 7 % (ref 3–12)
NEUTROPHILS NFR BLD: 86 % (ref 36–65)
NEUTS SEG NFR BLD: 16.17 K/UL (ref 1.5–8.1)
NRBC BLD-RTO: 0 PER 100 WBC
PLATELET # BLD AUTO: 350 K/UL (ref 138–453)
PMV BLD AUTO: 10.7 FL (ref 8.1–13.5)
POTASSIUM SERPL-SCNC: 5.1 MMOL/L (ref 3.7–5.3)
RBC # BLD AUTO: 3.34 M/UL (ref 3.95–5.11)
RBC # BLD: ABNORMAL 10*6/UL
SODIUM SERPL-SCNC: 140 MMOL/L (ref 135–144)
WBC OTHER # BLD: 18.7 K/UL (ref 3.5–11.3)

## 2024-07-04 PROCEDURE — 6370000000 HC RX 637 (ALT 250 FOR IP): Performed by: NURSE PRACTITIONER

## 2024-07-04 PROCEDURE — 2580000003 HC RX 258: Performed by: NURSE PRACTITIONER

## 2024-07-04 PROCEDURE — 6370000000 HC RX 637 (ALT 250 FOR IP)

## 2024-07-04 PROCEDURE — 94640 AIRWAY INHALATION TREATMENT: CPT

## 2024-07-04 PROCEDURE — 2580000003 HC RX 258: Performed by: INTERNAL MEDICINE

## 2024-07-04 PROCEDURE — 83735 ASSAY OF MAGNESIUM: CPT

## 2024-07-04 PROCEDURE — 2700000000 HC OXYGEN THERAPY PER DAY

## 2024-07-04 PROCEDURE — 6360000002 HC RX W HCPCS: Performed by: INTERNAL MEDICINE

## 2024-07-04 PROCEDURE — 80048 BASIC METABOLIC PNL TOTAL CA: CPT

## 2024-07-04 PROCEDURE — 2580000003 HC RX 258: Performed by: FAMILY MEDICINE

## 2024-07-04 PROCEDURE — 99232 SBSQ HOSP IP/OBS MODERATE 35: CPT | Performed by: FAMILY MEDICINE

## 2024-07-04 PROCEDURE — 85025 COMPLETE CBC W/AUTO DIFF WBC: CPT

## 2024-07-04 PROCEDURE — 6360000002 HC RX W HCPCS: Performed by: FAMILY MEDICINE

## 2024-07-04 PROCEDURE — 2500000003 HC RX 250 WO HCPCS: Performed by: INTERNAL MEDICINE

## 2024-07-04 PROCEDURE — 94761 N-INVAS EAR/PLS OXIMETRY MLT: CPT

## 2024-07-04 PROCEDURE — 82947 ASSAY GLUCOSE BLOOD QUANT: CPT

## 2024-07-04 PROCEDURE — 2580000003 HC RX 258

## 2024-07-04 PROCEDURE — 36415 COLL VENOUS BLD VENIPUNCTURE: CPT

## 2024-07-04 PROCEDURE — 94660 CPAP INITIATION&MGMT: CPT

## 2024-07-04 RX ORDER — SODIUM CHLORIDE 9 MG/ML
INJECTION, SOLUTION INTRAVENOUS CONTINUOUS
Status: DISCONTINUED | OUTPATIENT
Start: 2024-07-04 | End: 2024-07-04 | Stop reason: HOSPADM

## 2024-07-04 RX ORDER — BUMETANIDE 0.25 MG/ML
1 INJECTION INTRAMUSCULAR; INTRAVENOUS EVERY 8 HOURS
Status: DISCONTINUED | OUTPATIENT
Start: 2024-07-04 | End: 2024-07-04

## 2024-07-04 RX ORDER — ACETAMINOPHEN 650 MG/1
650 SUPPOSITORY RECTAL EVERY 4 HOURS PRN
Status: DISCONTINUED | OUTPATIENT
Start: 2024-07-04 | End: 2024-07-04 | Stop reason: HOSPADM

## 2024-07-04 RX ORDER — 0.9 % SODIUM CHLORIDE 0.9 %
500 INTRAVENOUS SOLUTION INTRAVENOUS ONCE
Status: COMPLETED | OUTPATIENT
Start: 2024-07-04 | End: 2024-07-04

## 2024-07-04 RX ORDER — BUMETANIDE 0.25 MG/ML
1 INJECTION INTRAMUSCULAR; INTRAVENOUS 2 TIMES DAILY
Status: DISCONTINUED | OUTPATIENT
Start: 2024-07-04 | End: 2024-07-04 | Stop reason: HOSPADM

## 2024-07-04 RX ADMIN — SODIUM CHLORIDE 500 ML: 9 INJECTION, SOLUTION INTRAVENOUS at 06:35

## 2024-07-04 RX ADMIN — SODIUM CHLORIDE: 9 INJECTION, SOLUTION INTRAVENOUS at 07:15

## 2024-07-04 RX ADMIN — BUMETANIDE 0.5 MG/HR: 0.25 INJECTION INTRAMUSCULAR; INTRAVENOUS at 04:19

## 2024-07-04 RX ADMIN — MICONAZOLE NITRATE: 20 POWDER TOPICAL at 09:45

## 2024-07-04 RX ADMIN — ENOXAPARIN SODIUM 30 MG: 100 INJECTION SUBCUTANEOUS at 10:13

## 2024-07-04 RX ADMIN — Medication: at 05:46

## 2024-07-04 RX ADMIN — SODIUM CHLORIDE, PRESERVATIVE FREE 40 MG: 5 INJECTION INTRAVENOUS at 10:13

## 2024-07-04 RX ADMIN — SODIUM CHLORIDE, PRESERVATIVE FREE 10 ML: 5 INJECTION INTRAVENOUS at 10:13

## 2024-07-04 RX ADMIN — IPRATROPIUM BROMIDE AND ALBUTEROL SULFATE 1 DOSE: 2.5; .5 SOLUTION RESPIRATORY (INHALATION) at 11:44

## 2024-07-04 RX ADMIN — ACETAMINOPHEN 650 MG: 650 SUPPOSITORY RECTAL at 04:05

## 2024-07-04 RX ADMIN — IPRATROPIUM BROMIDE AND ALBUTEROL SULFATE 1 DOSE: 2.5; .5 SOLUTION RESPIRATORY (INHALATION) at 07:53

## 2024-07-04 ASSESSMENT — PAIN SCALES - WONG BAKER
WONGBAKER_NUMERICALRESPONSE: NO HURT
WONGBAKER_NUMERICALRESPONSE: HURTS A LITTLE BIT
WONGBAKER_NUMERICALRESPONSE: NO HURT

## 2024-07-04 NOTE — PROGRESS NOTES
07/04/24 1055   Encounter Summary   Encounter Overview/Reason Spiritual/Emotional Needs;Follow-up   Service Provided For Patient and family together   Referral/Consult From Rounding   Support System Family members   Last Encounter  07/04/24   Complexity of Encounter Moderate   Begin Time 1040   End Time  1057   Total Time Calculated 17 min   Spiritual/Emotional needs   Type Spiritual Support   Assessment/Intervention/Outcome   Assessment Calm   Intervention Active listening;End of Life Care;Prayer (assurance of)/[place]   Outcome Acceptance;Engaged in conversation;Expressed Gratitude

## 2024-07-04 NOTE — PROGRESS NOTES
rounding on PCU    Assessment: Called to PCU as this patient is declining rapidly. Several family members are present. Patient's breathing is aided by BIPAP..    Intervention:  entered room and invited family to come around bed.  read Psalm 23 and invited all for Lords' Prayer. As we prayed, patent apparently passed.quietly  Dr. Hahn  pronounced at 1351.  remained available for family, Family was grieving appropriately.  presented a Grief packet..  .     Plan: Chaplains are available on site or on call 24/7 for spiritual and emotional support.

## 2024-07-04 NOTE — PROGRESS NOTES
07/04/24 1446   Encounter Summary   Encounter Overview/Reason Spiritual/Emotional Needs   Service Provided For Patient and family together   Referral/Consult From Nurse   Support System Family members   Last Encounter  07/04/24   Complexity of Encounter High   Begin Time 1330   End Time  1447   Total Time Calculated 77 min   Spiritual/Emotional needs   Type Spiritual Support   Grief, Loss, and Adjustments   Type Death   Assessment/Intervention/Outcome   Assessment Coping;Peaceful;Sad;Tearful   Intervention Active listening;Prayer (assurance of)/[place];Read/Provided Scripture;Sustaining Presence/Ministry of presence   Outcome Acceptance;Coping;Engaged in conversation;Expressed feelings, needs, and concerns;Expressed Gratitude;Grieving

## 2024-07-04 NOTE — PLAN OF CARE
Problem: Discharge Planning  Goal: Discharge to home or other facility with appropriate resources  Outcome: Progressing  Flowsheets (Taken 7/3/2024 2030)  Discharge to home or other facility with appropriate resources:   Identify barriers to discharge with patient and caregiver   Refer to discharge planning if patient needs post-hospital services based on physician order or complex needs related to functional status, cognitive ability or social support system     Problem: Pain  Goal: Verbalizes/displays adequate comfort level or baseline comfort level  Outcome: Progressing     Problem: ABCDS Injury Assessment  Goal: Absence of physical injury  Outcome: Progressing     Problem: Safety - Adult  Goal: Free from fall injury  Outcome: Progressing     Problem: Gastrointestinal - Adult  Goal: Minimal or absence of nausea and vomiting  Outcome: Progressing  Flowsheets (Taken 7/3/2024 2030)  Minimal or absence of nausea and vomiting:   Administer IV fluids as ordered to ensure adequate hydration   Administer ordered antiemetic medications as needed  Goal: Maintains or returns to baseline bowel function  Outcome: Progressing  Flowsheets (Taken 7/3/2024 2030)  Maintains or returns to baseline bowel function:   Assess bowel function   Administer IV fluids as ordered to ensure adequate hydration   Administer ordered medications as needed     Problem: Skin/Tissue Integrity  Goal: Absence of new skin breakdown  Description: 1.  Monitor for areas of redness and/or skin breakdown  2.  Assess vascular access sites hourly  3.  Every 4-6 hours minimum:  Change oxygen saturation probe site  4.  Every 4-6 hours:  If on nasal continuous positive airway pressure, respiratory therapy assess nares and determine need for appliance change or resting period.  Outcome: Progressing     Problem: Chronic Conditions and Co-morbidities  Goal: Patient's chronic conditions and co-morbidity symptoms are monitored and maintained or improved  Outcome:

## 2024-07-04 NOTE — DISCHARGE SUMMARY
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 *      Reason for Admission/HPI: Was transferred here from [facility] per surgery request with abdominal pain and rectal bleeding had recently diagnosed hepatic flexure colon cancer    Hospital Course: Patient had hemicoletomy for hepatic flexure colon cancer on 2024 pot op was complicated with respiratory failure requiring BIPAP and also developed pneumonia was on ZOSYN had CHILO with worsening of renal failure was recieving IV fluids and was on lasix  intermittently without much improvent in renal output so was on Bumex drip till yesterday.Patient also had AFIb with RVR was controlled with medications cardiolgy was consulted Echo with good EF..Had significant  anasarca due to third spacing.Was receiving TPN . Patient since yesterday has been minimally responsive  and was getting hypotensive family did not want any further intervention was made DNR cc.    Consultations: General surgery.cardiology    Procedures: None    Complications: Post op Renal failure    Disposition:     Discharge Condition: patient was pronounced dead at 1351on 2024    Discharge Medications:      Medication List        ASK your doctor about these medications      acetaminophen 500 MG tablet  Commonly known as: TYLENOL     * albuterol (2.5 MG/3ML) 0.083%

## 2024-07-04 NOTE — PROGRESS NOTES
rounding on PCU    Assessment: Patient continues to hold. Family continues hopper.    Intervention:  reviewed current status with nurses. Nurse sees a gradual decline. Blood pressure is low. Family continues presence. Sad but no emotion is displayed.  assured them of prayers    Plan: Chaplains are available on site or on call 24/7 for spiritual and emotional support.  advised to be notified if any significant change or decision to hospice care.

## 2024-07-04 NOTE — FLOWSHEET NOTE
07/04/24 1010   Vital Signs   BP (!) 58/36   MAP (Calculated) 43   MAP (mmHg) (!) 44     Dr Sharma aware of BP and urine output of 15ml.

## 2024-07-04 NOTE — PROGRESS NOTES
Surgery Progress Note            PATIENT NAME: Soumya Smith     TODAY'S DATE: 7/4/2024, 9:54 AM    CHIEF COMPLAINT:  S/p open right hemicolectomy.     SUBJECTIVE:    Pt seen and examined. No acute events overnight.  Pt remains minimally responsive and only responds to painful stimuli.  Spiking fevers overnight after stopping abx.  WBC increased.  Kidney function continues to worsen and now pt is having decreasing UOP despite bumex.  Respiratory status is still significantly decreased and pt continues to require BIPAP to maintain O2 levels.       OBJECTIVE:   VITALS:  BP (!) 92/53   Pulse 73   Temp 98.5 °F (36.9 °C) (Temporal)   Resp 20   Ht 1.626 m (5' 4\")   Wt 107.2 kg (236 lb 4.8 oz)   SpO2 94%   BMI 40.56 kg/m²      INTAKE/OUTPUT:      Intake/Output Summary (Last 24 hours) at 7/4/2024 0954  Last data filed at 7/4/2024 0717  Gross per 24 hour   Intake 2878.4 ml   Output 1920 ml   Net 958.4 ml                 CONSTITUTIONAL:  awake and alert.  No acute distress  CARDIOVASCULAR:  regular rate and rhythm   LUNGS:  increased lung sounds  ABDOMEN:   soft, obese, no apparent tenderness to palpation.  Midline incision clean with small opening centrally that is packed with saline WTD.  No erythema noted.  Drain with serous fluid, no signs of any feculent material.    EXTREMITIES:  generalized edema    Data:  CBC:   Lab Results   Component Value Date/Time    WBC 18.7 07/04/2024 05:18 AM    RBC 3.34 07/04/2024 05:18 AM    HGB 9.3 07/04/2024 05:18 AM    HCT 29.1 07/04/2024 05:18 AM    MCV 87.1 07/04/2024 05:18 AM    MCH 27.8 07/04/2024 05:18 AM    MCHC 32.0 07/04/2024 05:18 AM    RDW 16.5 07/04/2024 05:18 AM     07/04/2024 05:18 AM    MPV 10.7 07/04/2024 05:18 AM     CMP:    Lab Results   Component Value Date/Time     07/04/2024 05:18 AM    K 5.1 07/04/2024 05:18 AM     07/04/2024 05:18 AM    CO2 17 07/04/2024 05:18 AM    BUN 76 07/04/2024 05:18 AM    CREATININE 3.0 07/04/2024 05:18 AM

## 2024-07-04 NOTE — PROGRESS NOTES
Physical Therapy    Attempted to see patient this morning. Per nursing, held therapy this date.     Cristy Mancuso, PTA

## 2024-07-04 NOTE — PROGRESS NOTES
Hospitalist Progress Note  7/4/2024 10:12 AM  Subjective:   Admit Date: 6/23/2024  PCP: Jannet Wallace APRN - CNP    Interval History: Patient is s/p right hemicolectomy post op day#10 remains on Bipap has spiked fever overnight and WBC is trending up  .Has worsening of kidney function with creatine up to 3.0low urine output,continues with peripheral edema with  seepage in certain areas. Remains minimally responsive.  Wound culture with pseudomonas light growth    Diet: ADULT DIET; Dysphagia - Pureed  PN-Adult Premix  4.25/10 - Standard Electrolytes - Peripheral Line  PN-Adult Premix  4.25/10 - Standard Electrolytes - Peripheral Line  Medications:   Scheduled Meds:   sodium bicarbonate        sodium chloride  500 mL IntraVENous Once    enoxaparin  30 mg SubCUTAneous Daily    sodium chloride flush  5-40 mL IntraVENous 2 times per day    pantoprazole (PROTONIX) 40 mg in sodium chloride (PF) 0.9 % 10 mL injection  40 mg IntraVENous Daily    fat emulsion  250 mL IntraVENous Daily    metoprolol tartrate  25 mg Oral BID    sodium chloride flush  10 mL IntraVENous 2 times per day    midodrine  10 mg Oral TID WC    miconazole   Topical BID    ipratropium 0.5 mg-albuterol 2.5 mg  1 Dose Inhalation Q4H WA RT    amiodarone  200 mg Oral Daily    [Held by provider] atorvastatin  40 mg Oral Daily    [Held by provider] docusate sodium  100 mg Oral Daily    sertraline  100 mg Oral Daily    sucralfate  1 g Oral TID AC    mometasone-formoterol  2 puff Inhalation BID RT    tiotropium  2 puff Inhalation Daily RT     Continuous Infusions:   sodium chloride 50 mL/hr at 07/04/24 0715    PN-Adult Premix  4.25/10 - Standard Electrolytes - Peripheral Line 75 mL/hr at 07/04/24 0618    PN-Adult Premix  4.25/10 - Standard Electrolytes - Peripheral Line      sodium chloride      sodium bicarbonate 150 mEq in sodium chloride 0.9 % 1,000 mL infusion 50 mL/hr at 07/04/24 0546    sodium chloride      sodium chloride      sodium chloride Stopped

## 2024-07-05 LAB
MICROORGANISM SPEC CULT: ABNORMAL
MICROORGANISM SPEC CULT: ABNORMAL
MICROORGANISM/AGENT SPEC: ABNORMAL
SERVICE CMNT-IMP: ABNORMAL
SPECIMEN DESCRIPTION: ABNORMAL

## (undated) DEVICE — TOWEL,OR,DSP,ST,BLUE,STD,4/PK,20PK/CS: Brand: MEDLINE

## (undated) DEVICE — PACK SURG PROC REMINDER N WVN DISPOSABLE BEAC TIME OUT

## (undated) DEVICE — TRAY URIN CATH 16FR DRNGE BG STATLOK STBL DEV F SURSTP

## (undated) DEVICE — GLOVE ORANGE PI 7 1/2   MSG9075

## (undated) DEVICE — LEGGINGS, PAIR, 33X51 XL, STERILE: Brand: MEDLINE

## (undated) DEVICE — SUTURE PDS II SZ 1 L54IN ABSRB VLT L65MM TP-1 1/2 CIR Z879G

## (undated) DEVICE — KIT DRN FLAT W/ 100CC EVAC 10MM FULL PERF

## (undated) DEVICE — GAUZE,SPONGE,FLUFF,6"X6.75",STRL,5/TRAY: Brand: MEDLINE

## (undated) DEVICE — GAUZE,SPONGE,4"X4",12PLY,STERILE,LF,2'S: Brand: MEDLINE

## (undated) DEVICE — SUTURE PERMAHAND SZ 2-0 L17X18IN NONABSORBABLE BLK SILK SA65H

## (undated) DEVICE — INTENDED FOR TISSUE SEPARATION, AND OTHER PROCEDURES THAT REQUIRE A SHARP SURGICAL BLADE TO PUNCTURE OR CUT.: Brand: BARD-PARKER ® CARBON RIB-BACK BLADES

## (undated) DEVICE — SOLUTION IRRIG 1000ML 0.9% SOD CHL USP POUR PLAS BTL

## (undated) DEVICE — SYRINGE BLB 2 PC STER 50

## (undated) DEVICE — DRAPE THER FLUID WARMING 66X44 IN FLAT SLUSH DBL DISC ORS

## (undated) DEVICE — Z DISCONTINUED USE 2220130 SUTURE VICRYL SZ 2-0 L12X18IN ABSRB UD POLYGLACTIN 910 BRAID J911T

## (undated) DEVICE — SUTURE PROL SZ 1 L60IN NONABSORBABLE BLU L65MM TP-1 3/8 CIR 8824G

## (undated) DEVICE — SPONGE LAP W18XL18IN WHT COT 4 PLY FLD STRUNG RADPQ DISP ST 2 PER PACK

## (undated) DEVICE — PAD,ABDOMINAL,5"X9",ST,LF,25/BX: Brand: MEDLINE INDUSTRIES, INC.

## (undated) DEVICE — PENCIL ES L3M BTTN SWCH HOLSTER W/ BLDE ELECTRD EDGE

## (undated) DEVICE — 3M™ TEGADERM™ TRANSPARENT FILM DRESSING FRAME STYLE, 1628, 6 IN X 8 IN (15 CM X 20 CM), 10/CT 8CT/CASE: Brand: 3M™ TEGADERM™

## (undated) DEVICE — RELOAD STPL L75MM OPN H3.8MM CLS 1.5MM WIRE DIA0.2MM REG

## (undated) DEVICE — STAPLER SKIN LEG L3.9MM DIA0.53MM WIDE ROT HD FOR WND CLSR

## (undated) DEVICE — BLADE ES L6IN ELASTOMERIC COAT EXT DURABLE BEND UPTO 90DEG

## (undated) DEVICE — TOWEL SURG W16XL26IN GRN NONFENESTRATED RADPQ ST

## (undated) DEVICE — SUTURE PERMAHAND SZ 3-0 L18IN NONABSORBABLE BLK L26MM SH C013D

## (undated) DEVICE — NEPTUNE E-SEP SMOKE EVACUATION PENCIL, COATED, 70MM BLADE, PUSH BUTTON SWITCH: Brand: NEPTUNE E-SEP

## (undated) DEVICE — GLOVE SURG SZ 6 THK91MIL LTX FREE SYN POLYISOPRENE ANTI

## (undated) DEVICE — LAP CHOLE PK

## (undated) DEVICE — SUTURE NONABSORBABLE MONOFILAMENT 3-0 PS-1 18 IN BLK ETHILON 1663H

## (undated) DEVICE — Z INACTIVE USE 2660665 SOLUTION IRRIG 1000ML 0.9% SOD CHL USP POUR PLAS BTL

## (undated) DEVICE — 3M™ WARMING BLANKET, UPPER BODY, 10 PER CASE, 42268: Brand: BAIR HUGGER™

## (undated) DEVICE — PAD, GROUNDING, UNIVERSAL, SPLIT, 9': Brand: MEDLINE

## (undated) DEVICE — SUTURE VICRYL 0 L18IN ABSRB VLT POLYGLACTIN 910 BRAID COAT J906G

## (undated) DEVICE — SUTURE VICRYL SZ 3-0 L27IN ABSRB UD L26MM CT-2 1/2 CIR J232H

## (undated) DEVICE — SEALER LAP L20CM SHFT DIA10MM TISS FUS OPN INSTR STR BILAT

## (undated) DEVICE — Device

## (undated) DEVICE — TUBING, SUCTION, 3/16" X 20', STRAIGHT: Brand: MEDLINE

## (undated) DEVICE — GOWN,AURORA,NONRNF,XL,30/CS: Brand: MEDLINE

## (undated) DEVICE — SUTURE PERMAHAND SZ 0 L30IN NONABSORBABLE BLK SILK UNIDIR SA86G

## (undated) DEVICE — Z INACTIVE NO ACTIVE SUPPLIER APPLICATOR MEDICATED 26 CC TINT HI-LITE ORNG STRL CHLORAPREP

## (undated) DEVICE — STAPLER INT L75MM CUT LN L73MM STPL LN L77MM BLU B FRM 8